# Patient Record
Sex: MALE | Race: WHITE | NOT HISPANIC OR LATINO | Employment: OTHER | URBAN - METROPOLITAN AREA
[De-identification: names, ages, dates, MRNs, and addresses within clinical notes are randomized per-mention and may not be internally consistent; named-entity substitution may affect disease eponyms.]

---

## 2017-03-22 ENCOUNTER — APPOINTMENT (OUTPATIENT)
Dept: WOUND CARE | Facility: HOSPITAL | Age: 52
End: 2017-03-22
Payer: MEDICARE

## 2017-03-22 PROCEDURE — 99213 OFFICE O/P EST LOW 20 MIN: CPT

## 2017-08-25 ENCOUNTER — HOSPITAL ENCOUNTER (OUTPATIENT)
Facility: HOSPITAL | Age: 52
Setting detail: OBSERVATION
Discharge: HOME | End: 2017-08-25
Attending: EMERGENCY MEDICINE | Admitting: STUDENT IN AN ORGANIZED HEALTH CARE EDUCATION/TRAINING PROGRAM
Payer: MEDICARE

## 2017-08-25 VITALS
WEIGHT: 137.13 LBS | HEIGHT: 66 IN | SYSTOLIC BLOOD PRESSURE: 101 MMHG | DIASTOLIC BLOOD PRESSURE: 55 MMHG | TEMPERATURE: 95 F | HEART RATE: 68 BPM | OXYGEN SATURATION: 95 % | RESPIRATION RATE: 16 BRPM | BODY MASS INDEX: 22.04 KG/M2

## 2017-08-25 DIAGNOSIS — N47.2 PARAPHIMOSIS: Primary | ICD-10-CM

## 2017-08-25 PROBLEM — R45.1 AGITATION: Status: ACTIVE | Noted: 2017-08-25

## 2017-08-25 PROBLEM — G40.909 SEIZURE DISORDER (HCC): Status: ACTIVE | Noted: 2017-08-25

## 2017-08-25 PROBLEM — F63.9 IMPULSE CONTROL DISORDER: Status: ACTIVE | Noted: 2017-08-25

## 2017-08-25 LAB
ANION GAP SERPL CALCULATED.3IONS-SCNC: 5 MMOL/L (ref 4–13)
BUN SERPL-MCNC: 25 MG/DL (ref 5–25)
CALCIUM SERPL-MCNC: 8.8 MG/DL (ref 8.3–10.1)
CHLORIDE SERPL-SCNC: 105 MMOL/L (ref 100–108)
CO2 SERPL-SCNC: 32 MMOL/L (ref 21–32)
CREAT SERPL-MCNC: 0.93 MG/DL (ref 0.6–1.3)
EOSINOPHIL # BLD AUTO: 0.39 THOUSAND/UL (ref 0–0.61)
EOSINOPHIL NFR BLD MANUAL: 8 % (ref 0–6)
ERYTHROCYTE [DISTWIDTH] IN BLOOD BY AUTOMATED COUNT: 16.2 % (ref 11.6–15.1)
GFR SERPL CREATININE-BSD FRML MDRD: 93 ML/MIN/1.73SQ M
GLUCOSE SERPL-MCNC: 71 MG/DL (ref 65–140)
HCT VFR BLD AUTO: 38 % (ref 42–52)
HGB BLD-MCNC: 12.8 G/DL (ref 14–18)
INR PPP: 1.08 (ref 0.86–1.16)
LG PLATELETS BLD QL SMEAR: PRESENT
LYMPHOCYTES # BLD AUTO: 1.57 THOUSAND/UL (ref 0.6–4.47)
LYMPHOCYTES # BLD AUTO: 32 %
MCH RBC QN AUTO: 33 PG (ref 27–31)
MCHC RBC AUTO-ENTMCNC: 33.6 G/DL (ref 31.4–37.4)
MCV RBC AUTO: 98 FL (ref 82–98)
MONOCYTES # BLD AUTO: 0.93 THOUSAND/UL (ref 0–1.22)
MONOCYTES NFR BLD AUTO: 19 % (ref 4–12)
NEUTS BAND NFR BLD MANUAL: 16 % (ref 0–8)
NEUTS SEG # BLD: 1.96 THOUSAND/UL (ref 1.81–6.82)
NEUTS SEG NFR BLD AUTO: 24 %
NRBC BLD AUTO-RTO: 0 /100 WBCS
PLATELET # BLD AUTO: 186 THOUSANDS/UL (ref 130–400)
PLATELET BLD QL SMEAR: ADEQUATE
PMV BLD AUTO: 9.2 FL (ref 8.9–12.7)
POTASSIUM SERPL-SCNC: 4.1 MMOL/L (ref 3.5–5.3)
PROMYELOCYTES NFR BLD MANUAL: 1 % (ref 0–0)
PROTHROMBIN TIME: 11.4 SECONDS (ref 9.4–11.7)
RBC # BLD AUTO: 3.87 MILLION/UL (ref 4.7–6.1)
SODIUM SERPL-SCNC: 142 MMOL/L (ref 136–145)
TOTAL CELLS COUNTED SPEC: 100
WBC # BLD AUTO: 4.9 THOUSAND/UL (ref 4.8–10.8)

## 2017-08-25 PROCEDURE — 85610 PROTHROMBIN TIME: CPT | Performed by: STUDENT IN AN ORGANIZED HEALTH CARE EDUCATION/TRAINING PROGRAM

## 2017-08-25 PROCEDURE — 85007 BL SMEAR W/DIFF WBC COUNT: CPT | Performed by: EMERGENCY MEDICINE

## 2017-08-25 PROCEDURE — 96372 THER/PROPH/DIAG INJ SC/IM: CPT

## 2017-08-25 PROCEDURE — 87147 CULTURE TYPE IMMUNOLOGIC: CPT | Performed by: STUDENT IN AN ORGANIZED HEALTH CARE EDUCATION/TRAINING PROGRAM

## 2017-08-25 PROCEDURE — 99284 EMERGENCY DEPT VISIT MOD MDM: CPT

## 2017-08-25 PROCEDURE — 36415 COLL VENOUS BLD VENIPUNCTURE: CPT | Performed by: EMERGENCY MEDICINE

## 2017-08-25 PROCEDURE — 80048 BASIC METABOLIC PNL TOTAL CA: CPT | Performed by: EMERGENCY MEDICINE

## 2017-08-25 PROCEDURE — 85027 COMPLETE CBC AUTOMATED: CPT | Performed by: EMERGENCY MEDICINE

## 2017-08-25 PROCEDURE — 87081 CULTURE SCREEN ONLY: CPT | Performed by: STUDENT IN AN ORGANIZED HEALTH CARE EDUCATION/TRAINING PROGRAM

## 2017-08-25 RX ORDER — LACOSAMIDE 50 MG/1
50 TABLET ORAL EVERY MORNING
Status: DISCONTINUED | OUTPATIENT
Start: 2017-08-25 | End: 2017-08-25 | Stop reason: HOSPADM

## 2017-08-25 RX ORDER — ASPIRIN 81 MG
100 TABLET, DELAYED RELEASE (ENTERIC COATED) ORAL EVERY MORNING
COMMUNITY

## 2017-08-25 RX ORDER — QUETIAPINE FUMARATE 25 MG/1
50 TABLET, FILM COATED ORAL
Status: DISCONTINUED | OUTPATIENT
Start: 2017-08-25 | End: 2017-08-25 | Stop reason: HOSPADM

## 2017-08-25 RX ORDER — QUETIAPINE FUMARATE 25 MG/1
25 TABLET, FILM COATED ORAL
COMMUNITY

## 2017-08-25 RX ORDER — POLYETHYLENE GLYCOL 3350 17 G/17G
17 POWDER, FOR SOLUTION ORAL DAILY
COMMUNITY

## 2017-08-25 RX ORDER — LACOSAMIDE 50 MG/1
75 TABLET ORAL
COMMUNITY
End: 2017-09-28

## 2017-08-25 RX ORDER — CHOLECALCIFEROL (VITAMIN D3) 125 MCG
5 CAPSULE ORAL
COMMUNITY

## 2017-08-25 RX ORDER — LEVETIRACETAM 100 MG/ML
250 SOLUTION ORAL 2 TIMES DAILY
COMMUNITY
End: 2018-10-02 | Stop reason: ALTCHOICE

## 2017-08-25 RX ORDER — LACOSAMIDE 50 MG/1
75 TABLET ORAL
Status: DISCONTINUED | OUTPATIENT
Start: 2017-08-25 | End: 2017-08-25 | Stop reason: HOSPADM

## 2017-08-25 RX ORDER — SODIUM CHLORIDE 9 MG/ML
50 INJECTION, SOLUTION INTRAVENOUS CONTINUOUS
Status: DISCONTINUED | OUTPATIENT
Start: 2017-08-25 | End: 2017-08-25 | Stop reason: HOSPADM

## 2017-08-25 RX ORDER — MIDAZOLAM HYDROCHLORIDE 1 MG/ML
5 INJECTION INTRAMUSCULAR; INTRAVENOUS ONCE
Status: COMPLETED | OUTPATIENT
Start: 2017-08-25 | End: 2017-08-25

## 2017-08-25 RX ORDER — LACOSAMIDE 50 MG/1
100 TABLET ORAL EVERY 12 HOURS SCHEDULED
COMMUNITY

## 2017-08-25 RX ORDER — L. ACIDOPHILUS/L.BULGARICUS 1MM CELL
2 TABLET ORAL 2 TIMES DAILY
COMMUNITY

## 2017-08-25 RX ORDER — QUETIAPINE FUMARATE 25 MG/1
25 TABLET, FILM COATED ORAL EVERY MORNING
Status: DISCONTINUED | OUTPATIENT
Start: 2017-08-25 | End: 2017-08-25 | Stop reason: HOSPADM

## 2017-08-25 RX ORDER — FAMOTIDINE 40 MG/5ML
20 POWDER, FOR SUSPENSION ORAL 2 TIMES DAILY
COMMUNITY

## 2017-08-25 RX ORDER — LEVETIRACETAM 100 MG/ML
500 SOLUTION ORAL 2 TIMES DAILY
Status: DISCONTINUED | OUTPATIENT
Start: 2017-08-25 | End: 2017-08-25 | Stop reason: HOSPADM

## 2017-08-25 RX ORDER — FAMOTIDINE 20 MG/1
40 TABLET, FILM COATED ORAL 2 TIMES DAILY
Status: DISCONTINUED | OUTPATIENT
Start: 2017-08-25 | End: 2017-08-25 | Stop reason: HOSPADM

## 2017-08-25 RX ORDER — QUETIAPINE FUMARATE 50 MG/1
50 TABLET, FILM COATED ORAL
COMMUNITY

## 2017-08-25 RX ADMIN — FAMOTIDINE 40 MG: 20 TABLET ORAL at 11:35

## 2017-08-25 RX ADMIN — HYDROMORPHONE HYDROCHLORIDE 0.5 MG: 1 INJECTION, SOLUTION INTRAMUSCULAR; INTRAVENOUS; SUBCUTANEOUS at 11:22

## 2017-08-25 RX ADMIN — MIDAZOLAM 5 MG: 1 INJECTION INTRAMUSCULAR; INTRAVENOUS at 00:45

## 2017-08-25 RX ADMIN — LEVETIRACETAM 500 MG: 100 SOLUTION ORAL at 11:32

## 2017-08-25 RX ADMIN — HYDROMORPHONE HYDROCHLORIDE 0.5 MG: 1 INJECTION, SOLUTION INTRAMUSCULAR; INTRAVENOUS; SUBCUTANEOUS at 03:23

## 2017-08-25 RX ADMIN — SODIUM CHLORIDE 50 ML/HR: 0.9 INJECTION, SOLUTION INTRAVENOUS at 04:02

## 2017-08-25 RX ADMIN — LACOSAMIDE 50 MG: 50 TABLET, FILM COATED ORAL at 11:35

## 2017-08-25 RX ADMIN — QUETIAPINE FUMARATE 25 MG: 25 TABLET, FILM COATED ORAL at 11:37

## 2017-08-27 LAB — MRSA NOSE QL CULT: ABNORMAL

## 2017-09-08 ENCOUNTER — HOSPITAL ENCOUNTER (EMERGENCY)
Facility: HOSPITAL | Age: 52
Discharge: NURSING, REHAB OR LONG-TERM CARE | End: 2017-09-08
Attending: EMERGENCY MEDICINE
Payer: MEDICARE

## 2017-09-08 ENCOUNTER — APPOINTMENT (EMERGENCY)
Dept: RADIOLOGY | Facility: HOSPITAL | Age: 52
End: 2017-09-08
Payer: MEDICARE

## 2017-09-08 VITALS
RESPIRATION RATE: 16 BRPM | TEMPERATURE: 95.5 F | HEART RATE: 72 BPM | DIASTOLIC BLOOD PRESSURE: 62 MMHG | SYSTOLIC BLOOD PRESSURE: 106 MMHG | HEIGHT: 66 IN

## 2017-09-08 DIAGNOSIS — M25.472 EDEMA OF BOTH ANKLES: ICD-10-CM

## 2017-09-08 DIAGNOSIS — I82.409 DVT (DEEP VENOUS THROMBOSIS) (HCC): Primary | ICD-10-CM

## 2017-09-08 DIAGNOSIS — S62.609A FINGER FRACTURE, RIGHT: ICD-10-CM

## 2017-09-08 DIAGNOSIS — M25.471 EDEMA OF BOTH ANKLES: ICD-10-CM

## 2017-09-08 DIAGNOSIS — I82.533 CHRONIC DEEP VEIN THROMBOSIS (DVT) OF BOTH POPLITEAL VEINS (HCC): ICD-10-CM

## 2017-09-08 PROCEDURE — 93970 EXTREMITY STUDY: CPT

## 2017-09-08 PROCEDURE — 99283 EMERGENCY DEPT VISIT LOW MDM: CPT

## 2017-09-08 RX ADMIN — APIXABAN 5 MG: 5 TABLET, FILM COATED ORAL at 17:51

## 2017-09-16 ENCOUNTER — HOSPITAL ENCOUNTER (EMERGENCY)
Facility: HOSPITAL | Age: 52
Discharge: HOME | End: 2017-09-17
Attending: EMERGENCY MEDICINE | Admitting: EMERGENCY MEDICINE
Payer: MEDICARE

## 2017-09-16 VITALS
HEART RATE: 58 BPM | DIASTOLIC BLOOD PRESSURE: 49 MMHG | SYSTOLIC BLOOD PRESSURE: 106 MMHG | OXYGEN SATURATION: 99 % | RESPIRATION RATE: 18 BRPM | TEMPERATURE: 96.8 F

## 2017-09-16 DIAGNOSIS — R31.9 HEMATURIA: ICD-10-CM

## 2017-09-16 DIAGNOSIS — R79.89 ELEVATED LFTS: ICD-10-CM

## 2017-09-16 DIAGNOSIS — N39.0 UTI (URINARY TRACT INFECTION): Primary | ICD-10-CM

## 2017-09-16 LAB
ALBUMIN SERPL BCP-MCNC: 2.3 G/DL (ref 3.5–5)
ALP SERPL-CCNC: 244 U/L (ref 46–116)
ALT SERPL W P-5'-P-CCNC: 221 U/L (ref 12–78)
ANION GAP SERPL CALCULATED.3IONS-SCNC: 3 MMOL/L (ref 4–13)
APTT PPP: 29 SECONDS (ref 23–35)
AST SERPL W P-5'-P-CCNC: 129 U/L (ref 5–45)
BACTERIA UR QL AUTO: ABNORMAL /HPF
BASOPHILS # BLD AUTO: 0.2 THOUSANDS/ΜL (ref 0–0.1)
BASOPHILS NFR BLD AUTO: 3 % (ref 0–1)
BILIRUB SERPL-MCNC: 1 MG/DL (ref 0.2–1)
BILIRUB UR QL STRIP: ABNORMAL
BUN SERPL-MCNC: 25 MG/DL (ref 5–25)
CALCIUM SERPL-MCNC: 8.9 MG/DL (ref 8.3–10.1)
CHLORIDE SERPL-SCNC: 103 MMOL/L (ref 100–108)
CLARITY UR: ABNORMAL
CO2 SERPL-SCNC: 33 MMOL/L (ref 21–32)
COLOR UR: ABNORMAL
CREAT SERPL-MCNC: 1.21 MG/DL (ref 0.6–1.3)
EOSINOPHIL # BLD AUTO: 0.3 THOUSAND/ΜL (ref 0–0.61)
EOSINOPHIL NFR BLD AUTO: 5 % (ref 0–6)
ERYTHROCYTE [DISTWIDTH] IN BLOOD BY AUTOMATED COUNT: 16 % (ref 11.6–15.1)
GFR SERPL CREATININE-BSD FRML MDRD: 67 ML/MIN/1.73SQ M
GLUCOSE SERPL-MCNC: 77 MG/DL (ref 65–140)
GLUCOSE UR STRIP-MCNC: NEGATIVE MG/DL
HCT VFR BLD AUTO: 40.3 % (ref 42–52)
HGB BLD-MCNC: 13.7 G/DL (ref 14–18)
HGB UR QL STRIP.AUTO: ABNORMAL
INR PPP: 1.2 (ref 0.86–1.16)
KETONES UR STRIP-MCNC: ABNORMAL MG/DL
LEUKOCYTE ESTERASE UR QL STRIP: ABNORMAL
LYMPHOCYTES # BLD AUTO: 2.1 THOUSANDS/ΜL (ref 0.6–4.47)
LYMPHOCYTES NFR BLD AUTO: 33 % (ref 14–44)
MCH RBC QN AUTO: 34.2 PG (ref 27–31)
MCHC RBC AUTO-ENTMCNC: 33.9 G/DL (ref 31.4–37.4)
MCV RBC AUTO: 101 FL (ref 82–98)
MONOCYTES # BLD AUTO: 1.1 THOUSAND/ΜL (ref 0.17–1.22)
MONOCYTES NFR BLD AUTO: 18 % (ref 4–12)
NEUTROPHILS # BLD AUTO: 2.6 THOUSANDS/ΜL (ref 1.85–7.62)
NEUTS SEG NFR BLD AUTO: 42 % (ref 43–75)
NITRITE UR QL STRIP: POSITIVE
NON-SQ EPI CELLS URNS QL MICRO: ABNORMAL /HPF
NRBC BLD AUTO-RTO: 0 /100 WBCS
PH UR STRIP.AUTO: 8.5 [PH] (ref 5–9)
PLATELET # BLD AUTO: 339 THOUSANDS/UL (ref 130–400)
PMV BLD AUTO: 8.7 FL (ref 8.9–12.7)
POTASSIUM SERPL-SCNC: 4.8 MMOL/L (ref 3.5–5.3)
PROT SERPL-MCNC: 7.3 G/DL (ref 6.4–8.2)
PROT UR STRIP-MCNC: ABNORMAL MG/DL
PROTHROMBIN TIME: 12.6 SECONDS (ref 9.4–11.7)
RBC # BLD AUTO: 3.99 MILLION/UL (ref 4.7–6.1)
RBC #/AREA URNS AUTO: ABNORMAL /HPF
SODIUM SERPL-SCNC: 139 MMOL/L (ref 136–145)
SP GR UR STRIP.AUTO: 1.01 (ref 1–1.03)
UROBILINOGEN UR QL STRIP.AUTO: 1 E.U./DL
WBC # BLD AUTO: 6.3 THOUSAND/UL (ref 4.8–10.8)
WBC #/AREA URNS AUTO: ABNORMAL /HPF

## 2017-09-16 PROCEDURE — 81001 URINALYSIS AUTO W/SCOPE: CPT | Performed by: EMERGENCY MEDICINE

## 2017-09-16 PROCEDURE — 87086 URINE CULTURE/COLONY COUNT: CPT | Performed by: EMERGENCY MEDICINE

## 2017-09-16 PROCEDURE — 85025 COMPLETE CBC W/AUTO DIFF WBC: CPT | Performed by: EMERGENCY MEDICINE

## 2017-09-16 PROCEDURE — 36415 COLL VENOUS BLD VENIPUNCTURE: CPT | Performed by: EMERGENCY MEDICINE

## 2017-09-16 PROCEDURE — 85730 THROMBOPLASTIN TIME PARTIAL: CPT | Performed by: EMERGENCY MEDICINE

## 2017-09-16 PROCEDURE — 80053 COMPREHEN METABOLIC PANEL: CPT | Performed by: EMERGENCY MEDICINE

## 2017-09-16 PROCEDURE — 85610 PROTHROMBIN TIME: CPT | Performed by: EMERGENCY MEDICINE

## 2017-09-16 RX ORDER — SULFAMETHOXAZOLE AND TRIMETHOPRIM 200; 40 MG/5ML; MG/5ML
20 SUSPENSION ORAL 2 TIMES DAILY
Qty: 100 ML | Refills: 0 | Status: SHIPPED | OUTPATIENT
Start: 2017-09-17 | End: 2017-09-22

## 2017-09-16 RX ORDER — SULFAMETHOXAZOLE AND TRIMETHOPRIM 200; 40 MG/5ML; MG/5ML
160 SUSPENSION ORAL ONCE
Status: COMPLETED | OUTPATIENT
Start: 2017-09-17 | End: 2017-09-17

## 2017-09-17 PROCEDURE — 99283 EMERGENCY DEPT VISIT LOW MDM: CPT

## 2017-09-17 RX ADMIN — SULFAMETHOXAZOLE AND TRIMETHOPRIM 20 ML: 200; 40 SUSPENSION ORAL at 00:07

## 2017-09-18 LAB — BACTERIA UR CULT: NORMAL

## 2017-09-28 ENCOUNTER — HOSPITAL ENCOUNTER (EMERGENCY)
Facility: HOSPITAL | Age: 52
Discharge: HOME | End: 2017-09-29
Attending: EMERGENCY MEDICINE | Admitting: EMERGENCY MEDICINE
Payer: MEDICARE

## 2017-09-28 VITALS
RESPIRATION RATE: 18 BRPM | BODY MASS INDEX: 22.11 KG/M2 | DIASTOLIC BLOOD PRESSURE: 56 MMHG | WEIGHT: 137 LBS | TEMPERATURE: 96.7 F | HEART RATE: 66 BPM | OXYGEN SATURATION: 99 % | SYSTOLIC BLOOD PRESSURE: 97 MMHG

## 2017-09-28 DIAGNOSIS — N47.2 PARAPHIMOSIS: Primary | ICD-10-CM

## 2017-09-28 RX ORDER — KETOCONAZOLE 20 MG/G
1 CREAM TOPICAL 2 TIMES DAILY
COMMUNITY

## 2017-09-29 PROCEDURE — 99283 EMERGENCY DEPT VISIT LOW MDM: CPT

## 2017-09-29 NOTE — DISCHARGE INSTRUCTIONS
Acute Paraphimosis   AMBULATORY CARE:   Acute paraphimosis  is abnormal tightness of the foreskin when it is pulled back  The foreskin is the skin that covers the head (glans) of the penis  Usually, the foreskin can be pulled back onto the penis and uncover the glans  Acute paraphimosis prevents your foreskin from being pulled back  Common symptoms include the following:   · Swollen glans and shaft    · Bluish or dark glans    · Pain in your penis    · Pain when you urinate, or problems urinating  Seek care immediately if:   · You have sudden pain or swelling in your penis  · You lose feeling in your penis  · You have an open wound on your penis  Contact your healthcare provider if:   · Your signs and symptoms return or worsen  · You have pain during sexual activities  · You have questions or concerns about your condition or care  Treatment for acute paraphimosis  may not be needed  The swelling in your penis should decrease after your foreskin has returned to its normal position  You may need the following treatments if your foreskin does not return to its normal position:  · Medicines  may help decrease pain or swelling  · An ice pack  may be placed on the foreskin and glans for 5 to 10 minutes to decrease inflammation  · Tight pressure  may be needed for a short period of time  This will help decrease inflammation  Healthcare providers may wrap your penis with a bandage for 5 to 10 minutes  A bandage with numbing medicine may be used  · Procedures  may be needed to move your foreskin back into position over the glans  Another procedure may be needed to decrease severe swelling  · Surgery  may be needed if other treatments do not work  During surgery, the foreskin is placed in the right position  pressure and swelling are relieved  You may need a circumcision after this procedure because cutting the foreskin will change how your penis looks    Self care:   · Do not have sex until your healthcare provider says it is okay  Do not have any sexual activity for 7 to 10 days, to allow the penis to heal  Sexual activity includes intercourse and masturbation  Ask when you can go back to your usual sexual activities  · Keep your penis clean  Clean your penis every day by removing the smegma around your glans  Ask for more information about foreskin care  · Gently move your foreskin back to the normal position  Every time your foreskin is pulled back, make sure it returns to its original position  The foreskin must always cover the glans  Do not force the foreskin back over the glans  Force can cause scars to form on the penis  · Do not use penile rings  Penile rings can cause swelling and infection  Follow up with your healthcare provider as directed:  Write down your questions so you remember to ask them during your visits  © 2017 [employer] Information is for End User's use only and may not be sold, redistributed or otherwise used for commercial purposes  All illustrations and images included in CareNotes® are the copyrighted property of A D [employer] M [employer]  or Gregory Gonzalez  The above information is an  only  It is not intended as medical advice for individual conditions or treatments  Talk to your doctor, nurse or pharmacist before following any medical regimen to see if it is safe and effective for you

## 2017-09-29 NOTE — ED PROVIDER NOTES
History  Chief Complaint   Patient presents with    Penis Swelling     Resident of [facility]  Per transfer form [facility] " client with redness swelling of foreskin, able to reduce but promptly retracts"     47-year-old male from [address] [place] [address] with mental retardation cerebral palsy presents with his caregiver for complaints of paraphimosis  Patient's foreskin is not retracting over his glans penis  They tried tried to reduce it at the center however it keeps retracting  He has a Godoy catheter in place  Such an event happened month ago he was admitted to the hospital evaluated by urology Dr Izabel Fields who reduced the paraphimosis at bedside and he was discharged home  No reports of fevers chills vomiting abdominal pain or any other complaints  History provided by:  Patient   used: No        Prior to Admission Medications   Prescriptions Last Dose Informant Patient Reported? Taking?    Dentifrices (BIOTENE DRY MOUTH CARE DT) 2017 at [facility]  Yes Yes   Sig: Apply to teeth 2 (two) times a day 2 sprays   Docusate Sodium 50 MG/15ML LIQD 2017 at Unknown time  Yes Yes   Si mg every morning Via G tube    Melatonin 5 MG TABS 2017 at Unknown time  Yes Yes   Si mg by Per G Tube route daily at bedtime     QUEtiapine (SEROquel) 25 mg tablet 2017 at Unknown time  Yes Yes   Si mg by Per G Tube route every morning     QUEtiapine (SEROquel) 50 mg tablet 2017 at Unknown time  Yes Yes   Si mg by Per G Tube route daily at bedtime     famotidine (PEPCID) 40 mg/5 mL suspension 2017 at Unknown time  Yes Yes   Si mg by Per G Tube route 2 (two) times a day     ketoconazole (NIZORAL) 2 % cream 2017 at Unknown time  Yes Yes   Sig: Apply 1 application topically daily To face   lacosamide (VIMPAT) 50 mg 2017 at Unknown time  Yes Yes   Si mg by Per G Tube route every 12 (twelve) hours     lactobacillus acidophilus-bulgaricus (FLORANEX) tablet 2017 at Unknown time  Yes Yes   Si tablets 2 (two) times a day Via G-tube    levETIRAcetam (KEPPRA) 100 mg/mL oral solution 2017 at Unknown time  Yes Yes   Si mg by Per G Tube route 2 (two) times a day For 2 weeks then DC , started on   Than on 10/2 start 250 mg every bedtime    polyethylene glycol (MIRALAX) 17 g packet 2017 at Unknown time  Yes Yes   Si g daily Via G-tube       Facility-Administered Medications: None       Past Medical History:   Diagnosis Date    Constipation     Down syndrome     Fall     Folliculitis     Fracture of left forearm     Gastritis     Impulse control disorder     Left leg DVT     Pericardial effusion     Pleural effusion, bilateral     Pneumonia     Seizure        Past Surgical History:   Procedure Laterality Date    EXPLORATORY LAPAROTOMY      for abdominal pain    PEG TUBE PLACEMENT         No family history on file  I have reviewed and agree with the history as documented  Social History   Substance Use Topics    Smoking status: Never Smoker    Smokeless tobacco: Never Used    Alcohol use No        Review of Systems   All other systems reviewed and are negative  Physical Exam  ED Triage Vitals   Temperature Pulse Respirations Blood Pressure SpO2   17   (!) 96 7 °F (35 9 °C) 66 18 (!) 85/47 99 %      Temp Source Heart Rate Source Patient Position - Orthostatic VS BP Location FiO2 (%)   17 [id number] --   Tympanic Monitor Lying Left arm       Pain Score       --                  Physical Exam   Constitutional: He is oriented to person, place, and time  He appears well-developed and well-nourished  HENT:   Head: Normocephalic and atraumatic  Eyes: EOM are normal  Pupils are equal, round, and reactive to light  Neck: Normal range of motion  Neck supple  Cardiovascular: Normal rate and regular rhythm  Pulmonary/Chest: Effort normal and breath sounds normal    Abdominal: Soft  Bowel sounds are normal    Genitourinary:   Genitourinary Comments: Glans is edematous and mildly red however no signs of gangrene, and paraphimosis noted with gallagher catheter in place  Musculoskeletal: Normal range of motion  Neurological: He is alert and oriented to person, place, and time  Skin: Skin is warm and dry  Psychiatric: He has a normal mood and affect  Nursing note and vitals reviewed  ED Medications  Medications - No data to display    Diagnostic Studies  Labs Reviewed - No data to display    No orders to display       Procedures  Procedures      Phone Contacts  ED Phone Contact    ED Course  ED Course                                MDM  Number of Diagnoses or Management Options  Diagnosis management comments: Paraphimosis    I tried reducing the paraphimosis at bedside however it keeps retracting back  I spoke to Dr Lakia Garcia who advised the patient follow up in his clinic the next day within 24 hours  Patient Progress  Patient progress: stable    CritCare Time    Disposition  Final diagnoses:   Paraphimosis     ED Disposition     ED Disposition Condition Comment    Discharge  [place] [facility] discharge to home/self care  Condition at discharge: Stable        Follow-up Information     Follow up With Specialties Details Why Contact Info Additional Information    Amy Perez MD Urology Schedule an appointment as soon as possible for a visit in 1 day  [address] [place] [id number]  [place] [address] Emergency Department Emergency Medicine  If symptoms worsen [address] [place] [address]  [phone] [facility], [facility], [address]        Patient's Medications   Discharge Prescriptions    No medications on file     No discharge procedures on file      ED Provider  Electronically Signed by       Mariela Crowley DO  09/28/17 [address]

## 2017-09-29 NOTE — ED NOTES
Spoke with nursing supervisor at [facility] given on follow up care and in regards to transportation back to facility,ED will have to arrange transport back kellie Villanueva RN  09/28/17 9231

## 2017-10-12 ENCOUNTER — ANESTHESIA (OUTPATIENT)
Dept: PERIOP | Facility: HOSPITAL | Age: 52
End: 2017-10-12

## 2017-10-12 ENCOUNTER — ANESTHESIA EVENT (OUTPATIENT)
Dept: PERIOP | Facility: HOSPITAL | Age: 52
End: 2017-10-12

## 2018-03-23 ENCOUNTER — OFFICE VISIT (OUTPATIENT)
Dept: CARDIOLOGY CLINIC | Facility: CLINIC | Age: 53
End: 2018-03-23
Payer: MEDICARE

## 2018-03-23 VITALS
SYSTOLIC BLOOD PRESSURE: 104 MMHG | HEART RATE: 60 BPM | WEIGHT: 134 LBS | BODY MASS INDEX: 21.53 KG/M2 | HEIGHT: 66 IN | DIASTOLIC BLOOD PRESSURE: 56 MMHG

## 2018-03-23 DIAGNOSIS — Z01.810 PREOPERATIVE CARDIOVASCULAR EXAMINATION: Primary | ICD-10-CM

## 2018-03-23 DIAGNOSIS — N47.2 PARAPHIMOSIS: ICD-10-CM

## 2018-03-23 DIAGNOSIS — Q90.9 DOWN SYNDROME: ICD-10-CM

## 2018-03-23 DIAGNOSIS — F73 PROFOUND INTELLECTUAL DISABILITY IN ADULT: ICD-10-CM

## 2018-03-23 DIAGNOSIS — R45.1 AGITATION: ICD-10-CM

## 2018-03-23 DIAGNOSIS — G40.909 SEIZURE DISORDER (HCC): ICD-10-CM

## 2018-03-23 DIAGNOSIS — R94.31 ABNORMAL EKG: ICD-10-CM

## 2018-03-23 DIAGNOSIS — F63.9 IMPULSE CONTROL DISORDER: ICD-10-CM

## 2018-03-23 PROCEDURE — 99214 OFFICE O/P EST MOD 30 MIN: CPT | Performed by: INTERNAL MEDICINE

## 2018-03-23 RX ORDER — OSELTAMIVIR PHOSPHATE 75 MG/1
CAPSULE ORAL
COMMUNITY
Start: 2018-02-11 | End: 2018-10-02 | Stop reason: ALTCHOICE

## 2018-03-23 RX ORDER — OFLOXACIN 3 MG/ML
SOLUTION AURICULAR (OTIC)
COMMUNITY
Start: 2018-02-23 | End: 2018-10-02 | Stop reason: ALTCHOICE

## 2018-03-23 RX ORDER — LACOSAMIDE 100 MG/1
TABLET, FILM COATED ORAL
COMMUNITY
Start: 2018-03-06 | End: 2018-03-23 | Stop reason: SDUPTHER

## 2018-03-23 RX ORDER — POLYETHYLENE GLYCOL 3350 17 G/17G
POWDER, FOR SOLUTION ORAL
COMMUNITY
Start: 2018-01-29 | End: 2018-03-23

## 2018-03-23 NOTE — PATIENT INSTRUCTIONS
1   No cardiac contraindication to proposed surgery on 03/28/2018   2   Suggest non urgent and elective transthoracic echocardiogram to assess inferior wall motion with patient  sedated prior to procedure  3   No needed present for other cardiac testing

## 2018-03-23 NOTE — PROGRESS NOTES
Consultation - Cardiology   Shanna Gonzalez 52 y o  male MRN: [id number]  Unit/Bed#:  Encounter: [id number]        Assessment/Plan     Assessment:    1  No active cardiac disease  2   Extremely low cardiac risk for proposed surgery on 03/28/2018  3   Incomplete right bundle branch block with nondiagnostic inferior Q-waves on 03/16/2018 EKG  4   History of profound intellectual disability and Down's syndrome with impulse control disorder and agitation  5   Presence of PEG tube and history of aspiration pneumonia as well as prior tracheostomy  6   History of pericardial effusion and pericardial window  7  History of left lower extremity DVT  8  History of GERD  9  History of seizure disorder  10  Mild anemia    Plan:    1  No cardiac contraindication to proposed surgery on 03/28/2018   2   Suggest non urgent and elective transthoracic echocardiogram to assess inferior wall motion with patient  sedated prior to procedure  3   No need at present for other cardiac testing  History of Present Illness   Physician Requesting Consult:   Dr Radha Farias of [facility]    Reason for Consult / Principal Problem:  Abnormal EKG and need for preoperative cardiovascular risk assessment     HPI: Shanna Gonzalez is a 52y o  year old male who presents with abnormal EKG dated 03/16/2018  At that time he had a pattern of incomplete right bundle branch block with nondiagnostic inferior Q-waves, raising the possibility of previous inferior infarction  Incidental note was made of low QRS voltage  The patient is currently scheduled for circumcision on 03/28/2018 due to paraphimosis  He has no known cardiac history  The patient is nonverbal with information obtained by outside record, laboratory, EKG, and medication review, which consumed 38 minutes          Historical Information   Past Medical History:   Diagnosis Date    Constipation     Down syndrome     Dysphagia     has a peg tube    Fall     Folliculitis     Fracture of left forearm     Gastritis     GERD (gastroesophageal reflux disease)     Impulse control disorder     Jaw fracture (HCC)     s/p right mandibular fracture  s/p tracheostomy    [facility])     Pericardial effusion     Pericardial effusion     had a pericardial window 2015    Pleural effusion, bilateral     Pneumonia     [facility] [address] [facility]     Seizure ([facility] [address] )     new onset 2013     Past Surgical History:   Procedure Laterality Date    DENTAL REHABILITATION      EXPLORATORY LAPAROTOMY      for abdominal pain    PEG TUBE PLACEMENT      TRACHEOSTOMY      temporary after fall and jaw fracture 2015-weaned off vent and trach removed     History   Alcohol Use No     History   Drug Use No     History   Smoking Status    Never Smoker   Smokeless Tobacco    Never Used     History reviewed  No pertinent family history  Meds/Allergies   Prior to Admission medications    Medication Sig Start Date End Date Taking? Authorizing Provider   bisacodyl (DULCOLAX) 10 mg suppository Insert 10 mg into the rectum daily as needed for constipation   Yes Historical Provider, MD   Docusate Sodium 50 MG/15ML LIQD 100 mg every morning Via G tube    Yes Historical Provider, MD   famotidine (PEPCID) 40 mg/5 mL suspension 40 mg by Per G Tube route 2 (two) times a day     Yes Historical Provider, MD   ketoconazole (NIZORAL) 2 % cream Apply 1 application topically 2 (two) times a day To face     Yes Historical Provider, MD   lacosamide (VIMPAT) 50 mg 100 mg by Per G Tube route every 12 (twelve) hours     Yes Historical Provider, MD   lactobacillus acidophilus-bulgaricus (FLORANEX) tablet Take 2 tablets by mouth 2 (two) times a day Via G-tube    Yes Historical Provider, MD   levETIRAcetam (KEPPRA) 100 mg/mL oral solution 250 mg by Per G Tube route 2 (two) times a day For 2 weeks then DC , started on 9/18   Than on 10/2 start 250 mg every bedtime    Yes Historical Provider, MD   Melatonin 5 MG TABS 5 mg by Per G Tube route daily at bedtime     Yes Historical Provider, MD   Mouthwashes ([facility]) LIQD Apply to the mouth or throat 2 (two) times a day   Yes Historical Provider, MD   Nutritional Supplements (NUTREN 1 5 PO) Take by mouth Give at 45 ml/ hour until 1035 is infused in 22 hours with H2O flush of 30 ml/hr   Yes Historical Provider, MD   nystatin-triamcinolone (MYCOLOG-II) cream  3/14/18  Yes Historical Provider, MD   ofloxacin (FLOXIN) 0 3 % otic solution  2/23/18  Yes Historical Provider, MD   oseltamivir (TAMIFLU) 75 mg capsule  2/11/18  Yes Historical Provider, MD   polyethylene glycol (MIRALAX) 17 g packet 17 g daily Via G-tube    Yes Historical Provider, MD   povidone-iodine (BETADINE) 10 % external solution Apply topically daily at bedtime   Yes Historical Provider, MD   QUEtiapine (SEROquel) 25 mg tablet 25 mg by Per G Tube route every morning     Yes Historical Provider, MD   QUEtiapine (SEROquel) 50 mg tablet 50 mg by Per G Tube route daily at bedtime     Yes Historical Provider, MD   silver sulfadiazine (SILVADENE,SSD) 1 % cream Apply topically 2 (two) times a day   Yes Historical Provider, MD   Dentifrices ([facility]) Apply to teeth 2 (two) times a day 2 sprays  3/23/18 Yes Historical Provider, MD   VIMPAT 100 MG tablet  3/6/18 3/23/18 Yes Historical Provider, MD   polyethylene glycol Duy Olamide) powder  1/29/18 3/23/18  Historical Provider, MD     Current Outpatient Prescriptions   Medication Sig Dispense Refill    bisacodyl (DULCOLAX) 10 mg suppository Insert 10 mg into the rectum daily as needed for constipation      Docusate Sodium 50 MG/15ML LIQD 100 mg every morning Via G tube       famotidine (PEPCID) 40 mg/5 mL suspension 40 mg by Per G Tube route 2 (two) times a day        ketoconazole (NIZORAL) 2 % cream Apply 1 application topically 2 (two) times a day To face        lacosamide (VIMPAT) 50 mg 100 mg by Per G Tube route every 12 (twelve) hours        lactobacillus acidophilus-bulgaricus [facility]) tablet Take 2 tablets by mouth 2 (two) times a day Via G-tube       levETIRAcetam (KEPPRA) 100 mg/mL oral solution 250 mg by Per G Tube route 2 (two) times a day For 2 weeks then DC , started on 9/18  Than on 10/2 start 250 mg every bedtime       Melatonin 5 MG TABS 5 mg by Per G Tube route daily at bedtime        Mouthwashes (BIOTENE DRY MOUTH) LIQD Apply to the mouth or throat 2 (two) times a day      Nutritional Supplements (NUTREN 1 5 PO) Take by mouth Give at 45 ml/ hour until 1035 is infused in 22 hours with H2O flush of 30 ml/hr      nystatin-triamcinolone (MYCOLOG-II) cream       ofloxacin (FLOXIN) 0 3 % otic solution       oseltamivir (TAMIFLU) 75 mg capsule       polyethylene glycol (MIRALAX) 17 g packet 17 g daily Via G-tube       povidone-iodine (BETADINE) 10 % external solution Apply topically daily at bedtime      QUEtiapine (SEROquel) 25 mg tablet 25 mg by Per G Tube route every morning        QUEtiapine (SEROquel) 50 mg tablet 50 mg by Per G Tube route daily at bedtime        silver sulfadiazine (SILVADENE,SSD) 1 % cream Apply topically 2 (two) times a day       No current facility-administered medications for this visit  No Known Allergies    Cardiovascular ROS:       More than 10 systems reviewed and all systems negative, except as noted in history of present illness    Objective   Vitals: Blood pressure 104/56, pulse 60, height 5' 6" (1 676 m), weight 60 8 kg (134 lb)  Body mass index is 21 63 kg/m²  Physical Exam  General -well-developed well-nourished   male who is markedly agitated    Patient is alert and combative  [place] and dry with no pallor, rashes, ecchymoses, lesions  HEENT-normocephalic  Pupils equally round and reactive to light and accommodation  Extraocular muscles intact  Mucous membranes pink and moist  Sclerae nonicteric  Optic fundi poorly seen    Neck-no jugular venous distention, AJR, masses, thyromegaly, adenopathy, bruits  Lungs-no rales, rhonchi, wheezes  Breath sounds are distant owing to lack of cooperation  Heart-no murmur, gallop, rub, click, heave, thrill  Heart sounds are distant owing to lack of cooperation  Abdomen-normal bowel sounds with no masses, organomegaly, guarding, tenderness, or rigidity  Extremities-no cyanosis, clubbing, edema, pulse decrease  Neurological-no focal neurological signs  No results found for this or any previous visit (from the past 24 hour(s))  Imaging: I have personally reviewed pertinent films in PACS  EKG 03/16/2018: Incomplete right bundle branch block  Nondiagnostic inferior Q-waves  Low QRS voltage    Imaging  Chest X-Ray:  No Chest XR results available for this patient  Lab Review     Outside laboratory data 03/15/2018:    Normal BMP, PT/PTT, WBC and platelets  H/H-11 8/35    Lab Results   Component Value Date     09/16/2017    K 4 8 09/16/2017     09/16/2017    CO2 33 (H) 09/16/2017    ANIONGAP 3 (L) 09/16/2017    BUN 25 09/16/2017    CREATININE 1 21 09/16/2017    GLUCOSE 77 09/16/2017    CALCIUM 8 9 09/16/2017     (H) 09/16/2017     (H) 09/16/2017    ALKPHOS 244 (H) 09/16/2017    PROT 7 3 09/16/2017    BILITOT 1 00 09/16/2017    EGFR 67 09/16/2017       No results found for: CHOL  No results found for: HDL  No results found for: LDLCALC  No results found for: TRIG  No components found for: CHOLHDL    Lab Results   Component Value Date    GLUCOSE 77 09/16/2017    CALCIUM 8 9 09/16/2017     09/16/2017    K 4 8 09/16/2017    CO2 33 (H) 09/16/2017     09/16/2017    BUN 25 09/16/2017    CREATININE 1 21 09/16/2017       Counseling / Coordination of Care  Total  time spent today 42 minutes       Andrew Michaud MD

## 2018-10-02 ENCOUNTER — HOSPITAL ENCOUNTER (INPATIENT)
Facility: HOSPITAL | Age: 53
LOS: 10 days | Discharge: NURSING, REHAB OR LONG-TERM CARE | DRG: 871 | End: 2018-10-12
Attending: EMERGENCY MEDICINE | Admitting: INTERNAL MEDICINE
Payer: MEDICARE

## 2018-10-02 ENCOUNTER — APPOINTMENT (EMERGENCY)
Dept: RADIOLOGY | Facility: HOSPITAL | Age: 53
DRG: 871 | End: 2018-10-02
Payer: MEDICARE

## 2018-10-02 DIAGNOSIS — D69.6 THROMBOCYTOPENIA (HCC): ICD-10-CM

## 2018-10-02 DIAGNOSIS — G40.909 SEIZURE DISORDER (HCC): ICD-10-CM

## 2018-10-02 DIAGNOSIS — J18.9 PNEUMONIA: ICD-10-CM

## 2018-10-02 DIAGNOSIS — L89.310 PRESSURE INJURY OF RIGHT BUTTOCK, UNSTAGEABLE (HCC): ICD-10-CM

## 2018-10-02 DIAGNOSIS — N17.9 ACUTE KIDNEY INJURY (HCC): ICD-10-CM

## 2018-10-02 DIAGNOSIS — Q90.9 DOWN SYNDROME: ICD-10-CM

## 2018-10-02 DIAGNOSIS — J96.01 ACUTE RESPIRATORY FAILURE WITH HYPOXIA (HCC): ICD-10-CM

## 2018-10-02 DIAGNOSIS — Z93.1 PEG (PERCUTANEOUS ENDOSCOPIC GASTROSTOMY) STATUS (HCC): ICD-10-CM

## 2018-10-02 DIAGNOSIS — R65.20 SEVERE SEPSIS (HCC): Primary | ICD-10-CM

## 2018-10-02 DIAGNOSIS — A41.9 SEVERE SEPSIS (HCC): Primary | ICD-10-CM

## 2018-10-02 DIAGNOSIS — E87.0 HYPERNATREMIA: ICD-10-CM

## 2018-10-02 DIAGNOSIS — A41.9 SEPTIC SHOCK (HCC): ICD-10-CM

## 2018-10-02 DIAGNOSIS — R65.21 SEPTIC SHOCK (HCC): ICD-10-CM

## 2018-10-02 PROBLEM — D72.829 LEUKOCYTOSIS: Status: ACTIVE | Noted: 2018-10-02

## 2018-10-02 PROBLEM — N39.0 URINARY TRACT INFECTION: Status: ACTIVE | Noted: 2018-10-02

## 2018-10-02 LAB
ABO GROUP BLD: NORMAL
ABO GROUP BLD: NORMAL
ALBUMIN SERPL BCP-MCNC: 1.5 G/DL (ref 3.5–5)
ALP SERPL-CCNC: 138 U/L (ref 46–116)
ALT SERPL W P-5'-P-CCNC: 33 U/L (ref 12–78)
ANION GAP SERPL CALCULATED.3IONS-SCNC: 4 MMOL/L (ref 4–13)
APTT PPP: 30 SECONDS (ref 24–36)
AST SERPL W P-5'-P-CCNC: 74 U/L (ref 5–45)
BACTERIA UR QL AUTO: ABNORMAL /HPF
BASOPHILS # BLD MANUAL: 0.41 THOUSAND/UL (ref 0–0.1)
BASOPHILS NFR MAR MANUAL: 2 % (ref 0–1)
BILIRUB SERPL-MCNC: 0.5 MG/DL (ref 0.2–1)
BILIRUB UR QL STRIP: ABNORMAL
BLD GP AB SCN SERPL QL: NEGATIVE
BUN SERPL-MCNC: 66 MG/DL (ref 5–25)
CALCIUM SERPL-MCNC: 9 MG/DL (ref 8.3–10.1)
CHLORIDE SERPL-SCNC: 100 MMOL/L (ref 100–108)
CLARITY UR: ABNORMAL
CO2 SERPL-SCNC: 31 MMOL/L (ref 21–32)
COLOR UR: ABNORMAL
CREAT SERPL-MCNC: 2.79 MG/DL (ref 0.6–1.3)
EOSINOPHIL # BLD MANUAL: 0.41 THOUSAND/UL (ref 0–0.4)
EOSINOPHIL NFR BLD MANUAL: 2 % (ref 0–6)
ERYTHROCYTE [DISTWIDTH] IN BLOOD BY AUTOMATED COUNT: 17.1 % (ref 11.6–15.1)
FINE GRAN CASTS URNS QL MICRO: ABNORMAL /LPF
GFR SERPL CREATININE-BSD FRML MDRD: 24 ML/MIN/1.73SQ M
GLUCOSE SERPL-MCNC: 102 MG/DL (ref 65–140)
GLUCOSE UR STRIP-MCNC: NEGATIVE MG/DL
HCT VFR BLD AUTO: 25.3 % (ref 36.5–49.3)
HGB BLD-MCNC: 8 G/DL (ref 12–17)
HGB UR QL STRIP.AUTO: ABNORMAL
INR PPP: 1.25 (ref 0.86–1.16)
KETONES UR STRIP-MCNC: NEGATIVE MG/DL
LACTATE SERPL-SCNC: 1.4 MMOL/L (ref 0.5–2)
LACTATE SERPL-SCNC: 3.3 MMOL/L (ref 0.5–2)
LEUKOCYTE ESTERASE UR QL STRIP: ABNORMAL
LG PLATELETS BLD QL SMEAR: PRESENT
LYMPHOCYTES # BLD AUTO: 0.21 THOUSAND/UL (ref 0.6–4.47)
LYMPHOCYTES # BLD AUTO: 1 % (ref 14–44)
MACROCYTES BLD QL AUTO: PRESENT
MCH RBC QN AUTO: 34.5 PG (ref 26.8–34.3)
MCHC RBC AUTO-ENTMCNC: 31.6 G/DL (ref 31.4–37.4)
MCV RBC AUTO: 109 FL (ref 82–98)
METAMYELOCYTES NFR BLD MANUAL: 2 % (ref 0–1)
MONOCYTES # BLD AUTO: 0.41 THOUSAND/UL (ref 0–1.22)
MONOCYTES NFR BLD: 2 % (ref 4–12)
MUCOUS THREADS UR QL AUTO: ABNORMAL
NEUTROPHILS # BLD MANUAL: 18.87 THOUSAND/UL (ref 1.85–7.62)
NEUTS BAND NFR BLD MANUAL: 51 % (ref 0–8)
NEUTS SEG NFR BLD AUTO: 40 % (ref 43–75)
NITRITE UR QL STRIP: NEGATIVE
NON-SQ EPI CELLS URNS QL MICRO: ABNORMAL /HPF
NRBC BLD AUTO-RTO: 0 /100 WBCS
PH UR STRIP.AUTO: >=9 [PH] (ref 5–9)
PLATELET # BLD AUTO: 106 THOUSANDS/UL (ref 149–390)
PLATELET BLD QL SMEAR: ABNORMAL
PMV BLD AUTO: 12.2 FL (ref 8.9–12.7)
POTASSIUM SERPL-SCNC: 4.9 MMOL/L (ref 3.5–5.3)
PROCALCITONIN SERPL-MCNC: 10.72 NG/ML
PROT SERPL-MCNC: 6.4 G/DL (ref 6.4–8.2)
PROT UR STRIP-MCNC: ABNORMAL MG/DL
PROTHROMBIN TIME: 13 SECONDS (ref 9.4–11.7)
RBC # BLD AUTO: 2.32 MILLION/UL (ref 3.88–5.62)
RBC #/AREA URNS AUTO: ABNORMAL /HPF
RBC MORPH BLD: PRESENT
RH BLD: POSITIVE
RH BLD: POSITIVE
SODIUM SERPL-SCNC: 135 MMOL/L (ref 136–145)
SP GR UR STRIP.AUTO: 1.01 (ref 1–1.03)
SPECIMEN EXPIRATION DATE: NORMAL
TOTAL CELLS COUNTED SPEC: 100
TRI-PHOS CRY URNS QL MICRO: ABNORMAL /HPF
UROBILINOGEN UR QL STRIP.AUTO: 0.2 E.U./DL
WBC # BLD AUTO: 20.74 THOUSAND/UL (ref 4.31–10.16)
WBC #/AREA URNS AUTO: ABNORMAL /HPF
WBC TOXIC VACUOLES BLD QL SMEAR: PRESENT

## 2018-10-02 PROCEDURE — 87186 SC STD MICRODIL/AGAR DIL: CPT | Performed by: EMERGENCY MEDICINE

## 2018-10-02 PROCEDURE — 86901 BLOOD TYPING SEROLOGIC RH(D): CPT | Performed by: EMERGENCY MEDICINE

## 2018-10-02 PROCEDURE — 87631 RESP VIRUS 3-5 TARGETS: CPT | Performed by: EMERGENCY MEDICINE

## 2018-10-02 PROCEDURE — 96366 THER/PROPH/DIAG IV INF ADDON: CPT

## 2018-10-02 PROCEDURE — 81001 URINALYSIS AUTO W/SCOPE: CPT | Performed by: EMERGENCY MEDICINE

## 2018-10-02 PROCEDURE — 85610 PROTHROMBIN TIME: CPT | Performed by: EMERGENCY MEDICINE

## 2018-10-02 PROCEDURE — 87040 BLOOD CULTURE FOR BACTERIA: CPT | Performed by: EMERGENCY MEDICINE

## 2018-10-02 PROCEDURE — 99285 EMERGENCY DEPT VISIT HI MDM: CPT

## 2018-10-02 PROCEDURE — 96368 THER/DIAG CONCURRENT INF: CPT

## 2018-10-02 PROCEDURE — 71045 X-RAY EXAM CHEST 1 VIEW: CPT

## 2018-10-02 PROCEDURE — 99223 1ST HOSP IP/OBS HIGH 75: CPT | Performed by: INTERNAL MEDICINE

## 2018-10-02 PROCEDURE — 84145 PROCALCITONIN (PCT): CPT | Performed by: EMERGENCY MEDICINE

## 2018-10-02 PROCEDURE — 87147 CULTURE TYPE IMMUNOLOGIC: CPT | Performed by: EMERGENCY MEDICINE

## 2018-10-02 PROCEDURE — 96365 THER/PROPH/DIAG IV INF INIT: CPT

## 2018-10-02 PROCEDURE — 87081 CULTURE SCREEN ONLY: CPT | Performed by: INTERNAL MEDICINE

## 2018-10-02 PROCEDURE — 86850 RBC ANTIBODY SCREEN: CPT | Performed by: EMERGENCY MEDICINE

## 2018-10-02 PROCEDURE — 36415 COLL VENOUS BLD VENIPUNCTURE: CPT | Performed by: EMERGENCY MEDICINE

## 2018-10-02 PROCEDURE — 83605 ASSAY OF LACTIC ACID: CPT | Performed by: EMERGENCY MEDICINE

## 2018-10-02 PROCEDURE — 85027 COMPLETE CBC AUTOMATED: CPT | Performed by: EMERGENCY MEDICINE

## 2018-10-02 PROCEDURE — 85730 THROMBOPLASTIN TIME PARTIAL: CPT | Performed by: EMERGENCY MEDICINE

## 2018-10-02 PROCEDURE — 87086 URINE CULTURE/COLONY COUNT: CPT | Performed by: EMERGENCY MEDICINE

## 2018-10-02 PROCEDURE — 87077 CULTURE AEROBIC IDENTIFY: CPT | Performed by: EMERGENCY MEDICINE

## 2018-10-02 PROCEDURE — 87147 CULTURE TYPE IMMUNOLOGIC: CPT | Performed by: INTERNAL MEDICINE

## 2018-10-02 PROCEDURE — 85007 BL SMEAR W/DIFF WBC COUNT: CPT | Performed by: EMERGENCY MEDICINE

## 2018-10-02 PROCEDURE — 80053 COMPREHEN METABOLIC PANEL: CPT | Performed by: EMERGENCY MEDICINE

## 2018-10-02 PROCEDURE — 86900 BLOOD TYPING SEROLOGIC ABO: CPT | Performed by: EMERGENCY MEDICINE

## 2018-10-02 PROCEDURE — 96361 HYDRATE IV INFUSION ADD-ON: CPT

## 2018-10-02 RX ORDER — CHOLECALCIFEROL (VITAMIN D3) 125 MCG
5 CAPSULE ORAL
Status: DISCONTINUED | OUTPATIENT
Start: 2018-10-02 | End: 2018-10-02

## 2018-10-02 RX ORDER — FAMOTIDINE 40 MG/5ML
40 POWDER, FOR SUSPENSION ORAL 2 TIMES DAILY
Status: DISCONTINUED | OUTPATIENT
Start: 2018-10-02 | End: 2018-10-05 | Stop reason: DRUGHIGH

## 2018-10-02 RX ORDER — VANCOMYCIN HYDROCHLORIDE 1 G/200ML
15 INJECTION, SOLUTION INTRAVENOUS EVERY 24 HOURS
Status: DISCONTINUED | OUTPATIENT
Start: 2018-10-03 | End: 2018-10-03

## 2018-10-02 RX ORDER — VANCOMYCIN HYDROCHLORIDE 1 G/200ML
15 INJECTION, SOLUTION INTRAVENOUS ONCE
Status: COMPLETED | OUTPATIENT
Start: 2018-10-02 | End: 2018-10-02

## 2018-10-02 RX ORDER — LACOSAMIDE 50 MG/1
100 TABLET ORAL EVERY 12 HOURS SCHEDULED
Status: DISCONTINUED | OUTPATIENT
Start: 2018-10-02 | End: 2018-10-12 | Stop reason: HOSPADM

## 2018-10-02 RX ORDER — BISACODYL 10 MG
10 SUPPOSITORY, RECTAL RECTAL DAILY PRN
Status: DISCONTINUED | OUTPATIENT
Start: 2018-10-02 | End: 2018-10-12

## 2018-10-02 RX ORDER — QUETIAPINE FUMARATE 25 MG/1
50 TABLET, FILM COATED ORAL
Status: DISCONTINUED | OUTPATIENT
Start: 2018-10-02 | End: 2018-10-10

## 2018-10-02 RX ORDER — POLYETHYLENE GLYCOL 3350 17 G/17G
17 POWDER, FOR SOLUTION ORAL DAILY
Status: DISCONTINUED | OUTPATIENT
Start: 2018-10-03 | End: 2018-10-09

## 2018-10-02 RX ORDER — LANOLIN ALCOHOL/MO/W.PET/CERES
6 CREAM (GRAM) TOPICAL
Status: DISCONTINUED | OUTPATIENT
Start: 2018-10-02 | End: 2018-10-05

## 2018-10-02 RX ORDER — HEPARIN SODIUM 5000 [USP'U]/ML
5000 INJECTION, SOLUTION INTRAVENOUS; SUBCUTANEOUS EVERY 8 HOURS SCHEDULED
Status: DISCONTINUED | OUTPATIENT
Start: 2018-10-02 | End: 2018-10-12 | Stop reason: HOSPADM

## 2018-10-02 RX ORDER — SODIUM CHLORIDE, SODIUM GLUCONATE, SODIUM ACETATE, POTASSIUM CHLORIDE, MAGNESIUM CHLORIDE, SODIUM PHOSPHATE, DIBASIC, AND POTASSIUM PHOSPHATE .53; .5; .37; .037; .03; .012; .00082 G/100ML; G/100ML; G/100ML; G/100ML; G/100ML; G/100ML; G/100ML
100 INJECTION, SOLUTION INTRAVENOUS CONTINUOUS
Status: DISCONTINUED | OUTPATIENT
Start: 2018-10-02 | End: 2018-10-05

## 2018-10-02 RX ORDER — LACTOBACILLUS ACIDOPHILUS / LACTOBACILLUS BULGARICUS 100 MILLION CFU STRENGTH
1 GRANULES ORAL 2 TIMES DAILY
Status: DISCONTINUED | OUTPATIENT
Start: 2018-10-02 | End: 2018-10-12 | Stop reason: HOSPADM

## 2018-10-02 RX ORDER — QUETIAPINE FUMARATE 25 MG/1
25 TABLET, FILM COATED ORAL EVERY MORNING
Status: DISCONTINUED | OUTPATIENT
Start: 2018-10-03 | End: 2018-10-10

## 2018-10-02 RX ADMIN — VANCOMYCIN HYDROCHLORIDE 1000 MG: 1 INJECTION, SOLUTION INTRAVENOUS at 14:10

## 2018-10-02 RX ADMIN — CEFEPIME HYDROCHLORIDE 2000 MG: 2 INJECTION, SOLUTION INTRAVENOUS at 13:17

## 2018-10-02 RX ADMIN — QUETIAPINE 50 MG: 25 TABLET ORAL at 21:34

## 2018-10-02 RX ADMIN — METRONIDAZOLE 500 MG: 500 INJECTION, SOLUTION INTRAVENOUS at 22:42

## 2018-10-02 RX ADMIN — FAMOTIDINE 40 MG: 40 POWDER, FOR SUSPENSION ORAL at 22:38

## 2018-10-02 RX ADMIN — LACTOBACILLUS ACIDOPHILUS / LACTOBACILLUS BULGARICUS 1 PACKET: 100 MILLION CFU STRENGTH GRANULES at 21:34

## 2018-10-02 RX ADMIN — HEPARIN SODIUM 5000 UNITS: 5000 INJECTION, SOLUTION INTRAVENOUS; SUBCUTANEOUS at 21:34

## 2018-10-02 RX ADMIN — METRONIDAZOLE 500 MG: 500 INJECTION, SOLUTION INTRAVENOUS at 14:07

## 2018-10-02 RX ADMIN — LACOSAMIDE 100 MG: 50 TABLET, FILM COATED ORAL at 21:34

## 2018-10-02 RX ADMIN — SODIUM CHLORIDE 1000 ML: 0.9 INJECTION, SOLUTION INTRAVENOUS at 13:11

## 2018-10-02 RX ADMIN — SODIUM CHLORIDE, SODIUM GLUCONATE, SODIUM ACETATE, POTASSIUM CHLORIDE, MAGNESIUM CHLORIDE, SODIUM PHOSPHATE, DIBASIC, AND POTASSIUM PHOSPHATE 125 ML/HR: .53; .5; .37; .037; .03; .012; .00082 INJECTION, SOLUTION INTRAVENOUS at 19:08

## 2018-10-02 RX ADMIN — SODIUM CHLORIDE 1000 ML: 0.9 INJECTION, SOLUTION INTRAVENOUS at 11:56

## 2018-10-02 RX ADMIN — MELATONIN TAB 3 MG 6 MG: 3 TAB at 21:34

## 2018-10-02 NOTE — ASSESSMENT & PLAN NOTE
· Likely secondary to acute illness/septic shock     · Baseline Cr: 0 9-1  · Cr on admission: 2 79 peaked 3 4  · Current Cr: 1 99 yesterday  · Diuresed with Lasix on 10/5 and 10/6 due to concern for volume overload  · Nephrology Recommendations Appreciated

## 2018-10-02 NOTE — ASSESSMENT & PLAN NOTE
· Resolved  · Continues with bandemia   · WBC on admission: 20 74  · Current WBC: 9 9  · Will monitor with daily CBC

## 2018-10-02 NOTE — ED NOTES
BP moved from leg to arm,  BP noted to be 40pts higher on arm       Unique Shaffer RN  10/02/18 5332

## 2018-10-02 NOTE — ED PROVIDER NOTES
History  Chief Complaint   Patient presents with    Weakness - Generalized     pt presents to ED with c/o "renal failure, UTI, and general weakness" from [facility]       History provided by:  Caregiver   used: No         Patient with history of Down syndrome, seizure disorder presents with caregiver for evaluation of possible renal failure, anemia, UTI  Patient is a verbal   All of history taken from staff, medical records  Per staff, patient with more fatigue, lethargy than usual   Outpatient laboratory studies reportedly with anemia, renal failure, UTI  Level 5 caveat due to patient's condition of Down syndrome, averbal     Prior to Admission Medications   Prescriptions Last Dose Informant Patient Reported? Taking?    Docusate Sodium 50 MG/15ML LIQD  Outside Facility (Specify) Yes Yes   Si mg every morning Via G tube    Melatonin 5 MG TABS  Outside Facility (Specify) Yes Yes   Si mg by Per G Tube route daily at bedtime     Mouthwashes ([facility]) LIQD  Outside Facility (Specify) Yes Yes   Sig: Apply to the mouth or throat 2 (two) times a day   Nutritional Supplements (NUTREN 1 5 PO)  Outside Facility (Specify) Yes Yes   Sig: Take by mouth Give at 45 ml/ hour until 1035 is infused in 22 hours with H2O flush of 30 ml/hr   QUEtiapine (SEROquel) 25 mg tablet  Outside Facility (Specify) Yes Yes   Si mg by Per G Tube route every morning     QUEtiapine (SEROquel) 50 mg tablet  Outside Facility (Specify) Yes Yes   Si mg by Per G Tube route daily at bedtime     bisacodyl (DULCOLAX) 10 mg suppository  Outside Facility (Specify) Yes Yes   Sig: Insert 10 mg into the rectum daily as needed for constipation   famotidine (PEPCID) 40 mg/5 mL suspension  Outside Facility (Specify) Yes Yes   Si mg by Per G Tube route 2 (two) times a day     ketoconazole (NIZORAL) 2 % cream  Outside Facility (Specify) Yes Yes   Sig: Apply 1 application topically 2 (two) times a day To face     lacosamide (VIMPAT) 50 mg  Outside Facility (Specify) Yes Yes   Si mg by Per G Tube route every 12 (twelve) hours     lactobacillus acidophilus-bulgaricus (FLORANEX) tablet  Outside Facility (Specify) Yes Yes   Sig: Take 2 tablets by mouth 2 (two) times a day Via G-tube    polyethylene glycol (MIRALAX) 17 g packet  Outside Facility ([address] [place] [address]) Yes Yes   Si g daily Via G-tube    silver sulfadiazine (SILVADENE,SSD) 1 % cream  Outside Facility ([address] [place] [address]) Yes Yes   Sig: Apply topically 2 (two) times a day      Facility-Administered Medications: None       Past Medical History:   Diagnosis Date    Constipation     Down syndrome     Dysphagia     has a peg tube    Fall     Folliculitis     Fracture of left forearm     Gastritis     GERD (gastroesophageal reflux disease)     Impulse control disorder     [facility])     s/p right mandibular fracture  s/p tracheostomy    Left leg DVT ([facility])     Pericardial effusion     Pericardial effusion     had a pericardial window     Pleural effusion, bilateral     Pneumonia     Seizure ([address]     [facility]     new onset        Past Surgical History:   Procedure Laterality Date    DENTAL REHABILITATION      EXPLORATORY LAPAROTOMY      for abdominal pain    PEG TUBE PLACEMENT      TRACHEOSTOMY      temporary after fall and jaw fracture -weaned off vent and trach removed       History reviewed  No pertinent family history  I have reviewed and agree with the history as documented  Social History   Substance Use Topics    Smoking status: Never Smoker    Smokeless tobacco: Never Used    Alcohol use No        Review of Systems   Unable to perform ROS: Patient nonverbal       Physical Exam  Physical Exam   Constitutional: He appears well-developed and well-nourished  No distress  HENT:   Head: Normocephalic and atraumatic     Right Ear: External ear normal    Left Ear: External ear normal    Eyes: Pupils are equal, round, and reactive to light  EOM are normal  Right eye exhibits no discharge  Left eye exhibits no discharge  Neck: Normal range of motion  Neck supple  No tracheal deviation present  No thyromegaly present  Cardiovascular: Normal rate and regular rhythm  No murmur heard  Pulmonary/Chest:   Coarse/crackles to righ tlung   Abdominal: Soft  Bowel sounds are normal  He exhibits no distension  There is no tenderness  Indwelling gallagher catheter   Musculoskeletal: Normal range of motion  He exhibits no edema or deformity  Neurological: He is alert  No cranial nerve deficit  He exhibits normal muscle tone  Skin: Skin is warm  Capillary refill takes less than 2 seconds  He is not diaphoretic  Psychiatric: He has a normal mood and affect  His behavior is normal    Nursing note and vitals reviewed        Vital Signs  ED Triage Vitals [10/02/18 1132]   Temperature Pulse Respirations Blood Pressure SpO2   (!) 95 6 °F (35 3 °C) 87 (!) 24 90/58 95 %      Temp Source Heart Rate Source Patient Position - Orthostatic VS BP Location FiO2 (%)   Tympanic Monitor Lying Left arm --      Pain Score       --           Vitals:    10/02/18 1745 10/02/18 1800 10/02/18 1843 10/02/18 1900   BP: (!) 88/53 97/54 113/59 109/56   Pulse: 72 64 71 69   Patient Position - Orthostatic VS:   Lying        Visual Acuity      ED Medications  Medications   bisacodyl (DULCOLAX) rectal suppository 10 mg (not administered)   famotidine (PEPCID) oral suspension 40 mg (not administered)   lacosamide (VIMPAT) tablet 100 mg (not administered)   lactobacillus acidophilus-bulgaricus (FLORANEX) packet 1 packet (not administered)   polyethylene glycol (MIRALAX) packet 17 g (not administered)   QUEtiapine (SEROquel) tablet 25 mg (not administered)   QUEtiapine (SEROquel) tablet 50 mg (not administered)   multi-electrolyte (ISOLYTE-S PH 7 4 equivalent) IV solution (125 mL/hr Intravenous New Bag 10/2/18 1908)   heparin (porcine) subcutaneous injection 5,000 Units (not administered)   cefepime (MAXIPIME) IVPB (premix) 1,000 mg (not administered)   vancomycin (VANCOCIN) IVPB (premix) 1,000 mg (not administered)   metroNIDAZOLE (FLAGYL) IVPB (premix) 500 mg (not administered)   melatonin tablet 6 mg (not administered)   sodium chloride 0 9 % bolus 1,000 mL (0 mL Intravenous Stopped 10/2/18 1311)   vancomycin (VANCOCIN) IVPB (premix) 1,000 mg (0 mg/kg × 60 8 kg Intravenous Stopped 10/2/18 1621)   cefepime (MAXIPIME) 2 g/50 mL dextrose IVPB (0 mg Intravenous Stopped 10/2/18 1450)   metroNIDAZOLE (FLAGYL) IVPB (premix) 500 mg (0 mg Intravenous Stopped 10/2/18 1435)   sodium chloride 0 9 % bolus 1,000 mL (0 mL Intravenous Stopped 10/2/18 1435)       Diagnostic Studies  Results Reviewed     Procedure Component Value Units Date/Time    Sputum culture and Gram stain [[id number]]     Lab Status:  No result Specimen:  Sputum from Expectorated Sputum     Procalcitonin [[id number]]  (Abnormal) Collected:  10/02/18 1156    Lab Status:  Final result Specimen:  Blood from Arm, Left Updated:  10/02/18 1645     Procalcitonin 10 72 (H) ng/ml     Lactic acid x2 [[id number]]  (Normal) Collected:  10/02/18 1400    Lab Status:  Final result Specimen:  Blood from Arm, Left Updated:  10/02/18 1449     LACTIC ACID 1 4 mmol/L     Narrative:         Result may be elevated if tourniquet was used during collection  CBC and differential [[id number]]  (Abnormal) Collected:  10/02/18 1155    Lab Status:  Final result Specimen:  Blood from Arm, Left Updated:  10/02/18 1259     WBC 20 74 (H) Thousand/uL      RBC 2 32 (L) Million/uL      Hemoglobin 8 0 (L) g/dL      Hematocrit 25 3 (L) %       (H) fL      MCH 34 5 (H) pg      MCHC 31 6 g/dL      RDW 17 1 (H) %      MPV 12 2 fL      Platelets 934 (L) Thousands/uL      nRBC 0 /100 WBCs     Narrative: This is an appended report  These results have been appended to a previously verified report      Lactic acid x2 [[id number]]  (Abnormal) Collected:  10/02/18 1155    Lab Status:  Final result Specimen:  Blood from Arm, Left Updated:  10/02/18 1247     LACTIC ACID 3 3 (HH) mmol/L     Narrative:         Result may be elevated if tourniquet was used during collection  Urine Microscopic [[id number]]  (Abnormal) Collected:  10/02/18 1205    Lab Status:  Final result Specimen:  Urine from Urine, Indwelling Godoy Catheter Updated:  10/02/18 1235     RBC, UA       Field obscured, unable to enumerate (A)     /hpf     WBC, UA Innumerable (A) /hpf      Epithelial Cells Innumerable (A) /hpf      Bacteria, UA Innumerable (A) /hpf      Fine granular casts 4-5 /lpf      Triplep Phos April, UA Moderate /hpf      MUCOUS THREADS Moderate (A)    Urine culture [[id number]] Collected:  10/02/18 1205    Lab Status: In process Specimen:  Urine from Urine, Indwelling Godoy Catheter Updated:  10/02/18 1235    Comprehensive metabolic panel [[id number]]  (Abnormal) Collected:  10/02/18 1156    Lab Status:  Final result Specimen:  Blood from Arm, Left Updated:  10/02/18 1235     Sodium 135 (L) mmol/L      Potassium 4 9 mmol/L      Chloride 100 mmol/L      CO2 31 mmol/L      ANION GAP 4 mmol/L      BUN 66 (H) mg/dL      Creatinine 2 79 (H) mg/dL      Glucose 102 mg/dL      Calcium 9 0 mg/dL      AST 74 (H) U/L      ALT 33 U/L      Alkaline Phosphatase 138 (H) U/L      Total Protein 6 4 g/dL      Albumin 1 5 (L) g/dL      Total Bilirubin 0 50 mg/dL      eGFR 24 ml/min/1 73sq m     Narrative:         National Kidney Disease Education Program recommendations are as follows:  GFR calculation is accurate only with a steady state creatinine  Chronic Kidney disease less than 60 ml/min/1 73 sq  meters  Kidney failure less than 15 ml/min/1 73 sq  meters      Protime-INR [[id number]]  (Abnormal) Collected:  10/02/18 1156    Lab Status:  Final result Specimen:  Blood from Arm, Left Updated:  10/02/18 1228     Protime 13 0 (H) seconds      INR 1 25 (H)    APTT [[id number]]  (Normal) Collected: 10/02/18 1156    Lab Status:  Final result Specimen:  Blood from Arm, Left Updated:  10/02/18 1228     PTT 30 seconds     UA w Reflex to Microscopic w Reflex to Culture [[id number]]  (Abnormal) Collected:  10/02/18 1205    Lab Status:  Final result Specimen:  Urine from Urine, Indwelling Godoy Catheter Updated:  10/02/18 1222     Color, UA [place]     Clarity, UA Cloudy     Specific [place], UA 1 010     pH, UA >=9 0     Leukocytes, UA Large (A)     Nitrite, UA Negative     Protein,  (2+) (A) mg/dl      Glucose, UA Negative mg/dl      Ketones, UA Negative mg/dl      Urobilinogen, UA 0 2 E U /dl      Bilirubin, UA Interference- unable to analyze (A)     Blood, UA Large (A)    Influenza A/B and RSV by PCR (Indicated for patients > 2 mo of age) [[id number]] Collected:  10/02/18 1204    Lab Status: In process Specimen:  Nasopharyngeal from Nasopharyngeal Swab Updated:  10/02/18 1211    Blood culture #2 [[id number]] Collected:  10/02/18 1156    Lab Status: In process Specimen:  Blood from Arm, Left Updated:  10/02/18 1210    Blood culture #1 [[id number]] Collected:  10/02/18 1204    Lab Status: In process Specimen:  Blood from Hand, Right Updated:  10/02/18 1210                 XR chest portable - 1 view   Final Result by Wanda Edwards MD (10/02 1250)      Diffuse pneumonia in the right lung  Suspected right-sided pleural effusion  Workstation performed: [id number]                    Procedures  Procedures       Phone Contacts  ED Phone Contact    ED Course                         Initial Sepsis Screening     [address] Name 10/02/18 1254                Is the patient's history suggestive of a new or worsening infection? (!)  Yes (Proceed)  -MI        Suspected source of infection pneumonia  -MI        Are two or more of the following signs & symptoms of infection both present and new to the patient?         Indicate SIRS criteria Hypothermia < 36C (96 8F); Tachypnea > 20 resp per min;Leukocytosis (WBC > 200 IJL)  -MI        If the answer is yes to both questions, suspicion of sepsis is present          If severe sepsis is present AND tissue hypoperfusion perists in the hour after fluid resuscitation or lactate > 4, the patient meets criteria for SEPTIC SHOCK          Are any of the following organ dysfunction criteria present within 6 hours of suspected infection and SIRS criteria that are NOT considered to be chronic conditions? (!)  Yes  -MI        Organ dysfunction Creatinine > 2 0 mg/dL; Lactate > 2 0 mmol/L  -MI        Date of presentation of severe sepsis          Time of presentation of severe sepsis          Tissue hypoperfusion persists in the hour after crystalloid fluid administration, evidenced, by either:          Was hypotension present within one hour of the conclusion of crystalloid fluid administration?         Date of presentation of septic shock          Time of presentation of septic shock            User Key  (r) = Recorded By, (t) = Taken By, (c) = Cosigned By    [address] Name Provider Type    MI Valentino Solomons, MD Physician           Default Flowsheet Data (last 720 hours)      Sepsis Reassess     Row Name 10/02/18 1849 10/02/18 1501                Repeat Volume Status and Tissue Perfusion Assessment Performed    Repeat Volume Status and Tissue Perfusion Assessment Performed Yes  -PP Yes  -MI          Volume Status and Tissue Perfusion Post Fluid Resuscitation- Must Document 5 of the Following 7:    Vital Signs Reviewed (HR, RR, BP, T) Yes    64, 18, 97/54  -PP Yes  -MI       Arterial Oxygen Saturation Reviewed (POx, SaO2 or SpO2) Yes (comment %)   91  -PP         Cardio Normal S1/S2; Regular rate and rhythm; No murmor; No rub or gallop  -PP Normal S1/S2; Regular rate and rhythm  -MI       Pulmonary Clear to auscultation;Normal effort  -PP Normal effort  -MI       Capillary Refill   Brisk  -MI       Peripheral Pulses Radial;Dorsalis Pedis; Posterior Tibialis  -PP Radial  -MI Peripheral Pulse +2  -PP +2  -MI       Dorsalis Pedis +2  -PP         Posterior Tibialis +2  -PP         Skin Warm  -PP Warm  -MI       Urine output assessed Adequate  -PP            *OR*   Intensive Monitoring- Must Document One of the Following Four *:    Vital Signs Reviewed           * Central Venous Pressure (CVP or RAP)           * Central Venous Oxygen (SVO2, ScvO2 or Oxygen saturation via central catheter)           * Bedside Cardiovascular US in IVC diameter and % collapse           * Passive Leg Raise OR Crystalloid Challenge             User Key  (r) = Recorded By, (t) = Taken By, (c) = Cosigned By    [address] Name Provider Type    PP Annamaria Carr MD Resident    COURTNEY Seo MD Physician                MDM  Number of Diagnoses or Management Options  Pneumonia: new and requires workup  Severe sepsis [facility]): new and requires workup  Diagnosis management comments: Patient with low temperature upon arrival, coarse cough, tachypnea  Sepsis order set used  Chest x-ray with right-sided pneumonia  Patient from facility, vancomycin/Zosyn initially ordered  Laboratory studies with elevated white blood cell count, elevated prolactin, elevated creatinine, low hemoglobin  Patient made code sepsis when creatinine elevation resulted due to end-organ damage  Patient given 2 L of IV fluids  Discussed with ICU team who saw patient emergency department  Requested changing Zosyn to cefepime/Flagyl  Patient with transient hypotension recorded in the chart  Patient is sleeping at this time, improved with manual blood pressure  Initially ordered Levophed due to systolic blood pressure less than 90 however did not need to start this in emergency department  Patient seen in ED by ICU team, agree to watch patient closely due to Down syndrome, a verbal nature, transient low blood pressure  Patient admitted to the ICU in series in serious but stable condition         Amount and/or Complexity of Data Reviewed  Clinical lab tests: reviewed and ordered  Tests in the radiology section of CPT®: ordered and reviewed  Tests in the medicine section of CPT®: ordered and reviewed  Discuss the patient with other providers: yes    Risk of Complications, Morbidity, and/or Mortality  Presenting problems: high  Management options: high    Patient Progress  Patient progress: improved    CritCare Time    Disposition  Final diagnoses:   Pneumonia   Severe sepsis ([facility] )     Time reflects when diagnosis was documented in both MDM as applicable and the Disposition within this note     Time User Action Codes Description Comment    10/2/2018  3:27 PM Rastafari Rumpf Add [J18 9] Pneumonia     10/2/2018  3:27 PM Rastafari Rumpf Add [A41 9,  R65 20] [facility] ([facility] )     10/2/2018  3:27 PM Rastafari Rumpf Modify [J18 9] Pneumonia     10/2/2018  3:27 PM Rastafari Rumpf Modify [A41 9,  R65 20] Severe sepsis ([facility] )     10/2/2018  5:27 PM Erika Deiters Modify [J18 9] Pneumonia     10/2/2018  5:27 PM Heri Chester Add [Z93 1] PEG (percutaneous endoscopic gastrostomy) status ([place] [address] )     10/2/2018  5:27 PM Erika Deiters Add [G40 909] Seizure disorder ([facility] )     10/2/2018  5:27 PM Erika Deiters Add [Q90 9] Down syndrome       ED Disposition     ED Disposition Condition Comment    Admit  Case was discussed with ICU PA and the patient's admission status was agreed to be Admission Status: inpatient status to the service of Dr Uma Soto           Follow-up Information    None         Current Discharge Medication List      CONTINUE these medications which have NOT CHANGED    Details   bisacodyl (DULCOLAX) 10 mg suppository Insert 10 mg into the rectum daily as needed for constipation      Docusate Sodium 50 MG/15ML LIQD 100 mg every morning Via G tube       famotidine (PEPCID) 40 mg/5 mL suspension 40 mg by Per G Tube route 2 (two) times a day        ketoconazole (NIZORAL) 2 % cream Apply 1 application topically 2 (two) times a day To face lacosamide (VIMPAT) 50 mg 100 mg by Per G Tube route every 12 (twelve) hours        lactobacillus acidophilus-bulgaricus (FLORANEX) tablet Take 2 tablets by mouth 2 (two) times a day Via G-tube       Melatonin 5 MG TABS 5 mg by Per G Tube route daily at bedtime        Mouthwashes (BIOTENE DRY MOUTH) LIQD Apply to the mouth or throat 2 (two) times a day      Nutritional Supplements (NUTREN 1 5 PO) Take by mouth Give at 45 ml/ hour until 1035 is infused in 22 hours with H2O flush of 30 ml/hr      polyethylene glycol (MIRALAX) 17 g packet 17 g daily Via G-tube       !! QUEtiapine (SEROquel) 25 mg tablet 25 mg by Per G Tube route every morning        !! QUEtiapine (SEROquel) 50 mg tablet 50 mg by Per G Tube route daily at bedtime        silver sulfadiazine (SILVADENE,SSD) 1 % cream Apply topically 2 (two) times a day       !! - Potential duplicate medications found  Please discuss with provider  No discharge procedures on file      ED Provider  Electronically Signed by           Davi Dia MD  10/02/18 8615

## 2018-10-02 NOTE — ASSESSMENT & PLAN NOTE
· Admitted 10/2 with sepsis secondary to UTI and Pneumonia  · Improved and transferred to the floor on 10/4 , and return early morning 10/5 with hypoxia and hypotension  · White count continues to improve  Procalcitonin improving  · Became hypotensive  after intubation  Required high dose pressors and steroids were added  Started on high-dose vitamin C and thiamine  · Echo on 10/5/18: EF: 60%, no significant valvular dysfunction, no findings that would suggest moderate or severe pulmonary hypertension    · Procalcitonin on admission:  · Current Procalcitonin: 1 09  · BCX x2: Proteus  · Sputum cx unimpressive  · Influenza A/B: Negative  · UCx:  E coli, Morganella morganii, beta-hemolytic Streptococcus group G  · Repeat CXR on 10/8/18:  · Worsening bilateral infiltrates, left side greater than right, and moderate-sized right pleural effusion  · Pressors have been weaned off  · Completed 7 day course of cefepime  · Patient was extubated yesterday

## 2018-10-02 NOTE — ASSESSMENT & PLAN NOTE
· Pt with chronic suprapubic catheter  · Catheter exchanged on 10/8  · UA on admission:   · + Leukocytes, Large Blood, Innumerable WBC/Bacteria  · UCx with E coli     · Pt completed 7 day course of cefepime

## 2018-10-02 NOTE — ED NOTES
Attempting to collect vitals however pt is combative and non cooperative       Dariela Lewis, ZAID  10/02/18 7188

## 2018-10-02 NOTE — ED NOTES
Pulse ox replaced on toe and BP cuff repostioned, kicking at staff, attendant at bedside     Pratik Torres RN  10/02/18 3676

## 2018-10-02 NOTE — H&P
History and Physical - Critical Care  Jean Pierre Dong 52 y o  male MRN: [id number]  Unit/Bed#: ED 04 Encounter: [id number]     Reason for Admission / Chief Complaint: Septic Shock     History of Present Illness:  Jean Pierre Dong is a 52 y o  male with a PMH of Down Syndrome, Impulse Control Disorder, and Seizures who presents with caretaker from [facility]) to the ED with a chief complaint of lethargy  Patient is nonverbal at baseline due to history of Down Syndrome and so history obtained from caregiver  States concern for increasing agitation, lethargy, and generalized weakness  Additionally, notes chronic gallagher catheter in place with a change in color and smell  History obtained from chart review and unobtainable from patient due to mental status, lack of cooperation and nonverbal at baseline  Past Medical History:  Past Medical History:   Diagnosis Date    Constipation     Down syndrome     Dysphagia     has a peg tube    Fall     Folliculitis     Fracture of left forearm     Gastritis     GERD (gastroesophageal reflux disease)     Impulse control disorder     Jaw fracture ([facility])     s/p right mandibular fracture  s/p tracheostomy    Left leg DVT ([facility])     Pericardial effusion     Pericardial effusion     had a pericardial window 2015    Pleural effusion, bilateral     Pneumonia     Seizure ([address]     Seizure ([address] )     new onset 2013        Past Surgical History:  Past Surgical History:   Procedure Laterality Date    DENTAL REHABILITATION      EXPLORATORY LAPAROTOMY      for abdominal pain    PEG TUBE PLACEMENT      TRACHEOSTOMY      temporary after fall and jaw fracture 2015-weaned off vent and trach removed        Past Family History:  History reviewed  No pertinent family history       Social History:  History   Smoking Status    Never Smoker   Smokeless Tobacco    Never Used     History   Alcohol Use No     History   Drug Use No     Marital Status: Single  Exercise History: N/A     Medications:  Current Facility-Administered Medications   Medication Dose Route Frequency    norepinephrine (LEVOPHED) 4 mg (STANDARD CONCENTRATION) IV in sodium chloride 0 9% 250 mL  1-30 mcg/min Intravenous Titrated     Home medications:  Prior to Admission medications    Medication Sig Start Date End Date Taking?  Authorizing Provider   bisacodyl (DULCOLAX) 10 mg suppository Insert 10 mg into the rectum daily as needed for constipation   Yes Historical Provider, MD   Docusate Sodium 50 MG/15ML LIQD 100 mg every morning Via G tube    Yes Historical Provider, MD   famotidine (PEPCID) 40 mg/5 mL suspension 40 mg by Per G Tube route 2 (two) times a day     Yes Historical Provider, MD   ketoconazole (NIZORAL) 2 % cream Apply 1 application topically 2 (two) times a day To face     Yes Historical Provider, MD   lacosamide (VIMPAT) 50 mg 100 mg by Per G Tube route every 12 (twelve) hours     Yes Historical Provider, MD   lactobacillus acidophilus-bulgaricus (FLORANEX) tablet Take 2 tablets by mouth 2 (two) times a day Via G-tube    Yes Historical Provider, MD   Melatonin 5 MG TABS 5 mg by Per G Tube route daily at bedtime     Yes Historical Provider, MD   Mouthwashes ([address] [place] [address]) LIQD Apply to the mouth or throat 2 (two) times a day   Yes Historical Provider, MD   Nutritional Supplements (NUTREN 1 5 PO) Take by mouth Give at 45 ml/ hour until 1035 is infused in 22 hours with H2O flush of 30 ml/hr   Yes Historical Provider, MD   polyethylene glycol (MIRALAX) 17 g packet 17 g daily Via G-tube    Yes Historical Provider, MD   QUEtiapine (SEROquel) 25 mg tablet 25 mg by Per G Tube route every morning     Yes Historical Provider, MD   QUEtiapine (SEROquel) 50 mg tablet 50 mg by Per G Tube route daily at bedtime     Yes Historical Provider, MD   silver sulfadiazine (SILVADENE,SSD) 1 % cream Apply topically 2 (two) times a day   Yes Historical Provider, MD   levETIRAcetam (KEPPRA) 100 mg/mL oral solution 250 mg by Per G Tube route 2 (two) times a day For 2 weeks then DC , started on   Than on 10/2 start 250 mg every bedtime   10/2/18  Historical Provider, MD   nystatin-triamcinolone [facility]) cream  3/14/18 10/2/18  Historical Provider, MD   ofloxacin (FLOXIN) 0 3 % otic solution  2/23/18 10/2/18  Historical Provider, MD   oseltamivir (TAMIFLU) 75 mg capsule  2/11/18 10/2/18  Historical Provider, MD   povidone-iodine (BETADINE) 10 % external solution Apply topically daily at bedtime  10/2/18  Historical Provider, MD     Allergies:  No Known Allergies     ROS:   Review of Systems   Unable to perform ROS: Patient nonverbal        Vitals:  Vitals:    10/02/18 1444 10/02/18 1515 10/02/18 1530 10/02/18 1600   BP: (!) 86/46 (!) 83/53 119/59 91/54   BP Location: Left leg Left leg     Pulse:  72 72 66   Resp:  (!) 33 (!) 34 19   Temp:       TempSrc:       SpO2:  94% 95% 94%   Weight:       Height:         Temperature:   Temp (24hrs), Av 6 °F (35 3 °C), Min:95 6 °F (35 3 °C), Max:95 6 °F (35 3 °C)    Current: Temperature: (!) 95 6 °F (35 3 °C)     Weights:   IBW: 63 8 kg  Body mass index is 21 63 kg/m²  Hemodynamic Monitoring:  N/A     Non-Invasive/Invasive Ventilation Settings:  Respiratory    Lab Data (Last 4 hours)    None         O2/Vent Data (Last 4 hours)    None              No results found for: PHART, JBE2LCB, PO2ART, OUF9SNW, M7QVUNEO, BEART, SOURCE  SpO2: SpO2: 94 %     Physical Exam:  Physical Exam   Constitutional: He appears well-developed and well-nourished  No distress  HENT:   Head: Normocephalic  Eyes: Pupils are equal, round, and reactive to light  Conjunctivae and EOM are normal  Right eye exhibits no discharge  Left eye exhibits no discharge  Neck: Normal range of motion  Neck supple  Cardiovascular: Normal rate, regular rhythm, normal heart sounds and intact distal pulses  Exam reveals no gallop and no friction rub  No murmur heard    Pulmonary/Chest: Effort normal  No respiratory distress  He has no wheezes  He has no rales  He exhibits no tenderness  Dullness to Percussion over Right Lung   Abdominal: Soft  Bowel sounds are normal  He exhibits no distension and no mass  There is no tenderness  There is no rebound and no guarding  PEG   Musculoskeletal: He exhibits deformity  He exhibits no edema or tenderness  Lymphadenopathy:     He has no cervical adenopathy  Neurological:   Hx of Down Syndrome   Skin: He is not diaphoretic  Nursing note and vitals reviewed  Labs:    Results from last 7 days  Lab Units 10/02/18  1155   WBC Thousand/uL 20 74*   HEMOGLOBIN g/dL 8 0*   HEMATOCRIT % 25 3*   PLATELETS Thousands/uL 106*   MONO PCT MAN % 2*       Results from last 7 days  Lab Units 10/02/18  1156   SODIUM mmol/L 135*   POTASSIUM mmol/L 4 9   CHLORIDE mmol/L 100   CO2 mmol/L 31   BUN mg/dL 66*   CREATININE mg/dL 2 79*   CALCIUM mg/dL 9 0   ALK PHOS U/L 138*   ALT U/L 33   AST U/L 74*                Results from last 7 days  Lab Units 10/02/18  1156   INR  1 25*   PTT seconds 30       Results from last 7 days  Lab Units 10/02/18  1400   LACTIC ACID mmol/L 1 4     No results found for: TROPONINI     Imaging:     XR chest portable - 1 view   Final Result      Diffuse pneumonia in the right lung  Suspected right-sided pleural effusion  Workstation performed: [id number]            I have personally reviewed pertinent reports  EKG: This was personally reviewed by myself  Micro:  Lab Results   Component Value Date    URINECX >100,000 cfu/ml Mixed Contaminants X6 09/16/2017       Assessment/Plan:    * Septic shock ([address] )   Assessment & Plan    · Likely secondary to Right Upper/Lower Lobe Pneumonia vs  UTI  · Vitals on admission: T: 95 6 / RR: 24 / BP: 90/58 / Lactic Acid: 3 3 / WBC: 20 74 / SpO2: 95% Room Air  · CXR on admission:   · Diffuse pneumonia in the right lung  Suspected right-sided pleural effusion    · UA on admission:   · + Leukocytes, Large Blood, Innumerable WBC/Bacteria  · BCX and UCx are Pending  · Lactic Acid Trend: 3 3 - 1 4  · s/p 2L NaCl 0 9% bolus  · BP dropped below 90 systolic  · IV Levophed 4mg started   · Titrate Accordingly  · Will hold placement of central line as patient status is clinically improving  · Obtain Sputum Cx and GS  · Start Isolyte 125cc/hr  · Start Vancomycin, Cefepime, and Flagyl       Pneumonia   Assessment & Plan    · CXR on admission:  · Diffuse pneumonia in the right lung  Suspected right-sided pleural effusion  · Likely secondary to Gram - vs  Gram + Bacteria additionally with concern for aspiration  · Start Vancomycin, Cefepime, and Flagyl       Urinary tract infection   Assessment & Plan    · UA on admission:   · + Leukocytes, Large Blood, Innumerable WBC/Bacteria  · UCx Pending  · Hx of Chronic [place] Catheter   · Will Change  · Continue ABx therapy As Above     Acute kidney injury ([facility] )   Assessment & Plan    · Baseline Cr: 0 9-1  · Cr on admission: 2 79  · Start Isolyte 125cc/hr  · Will monitor with daily BMP     Leukocytosis   Assessment & Plan    · WBC on admission: 20 74  · Will monitor with daily CBC     Seizure disorder ([facility] )   Assessment & Plan    · Continue home medications:   · Seroquel 25mg qd morning and 50mg qd bedtime     Down syndrome   Assessment & Plan    · At Baseline     Impulse control disorder   Assessment & Plan    · At Baseline  · Agitation Control prn          VTE Pharmacologic Prophylaxis: Heparin  VTE Mechanical Prophylaxis: sequential compression device     Invasive lines and devices: Invasive Devices     Peripheral Intravenous Line            Peripheral IV 10/02/18 Left Antecubital less than 1 day          Drain            Urethral Catheter 18 Fr  -- days                 Code Status: Full Code     Given critical illness, patient length of stay will require greater than two midnights       Counseling / Coordination of Care  Total Critical Care time spent 30 minutes excluding procedures, teaching and family updates                 Anoop Gray MD

## 2018-10-02 NOTE — ED NOTES
Pt presents to ED from [facility] with Mariela Ca, care specialist at bedside  Pt unable to have conversation at baseline  facesheet states "UTI, renal failure and generalized weakness " Mariela Ca states pt "has been more lethargic than normal " Upon assessment of pt, pt becomes agitated  Mariela Ca states "this is normally his baseline " resp even non-labored  Productive cough heard upon assessment  Unable to ambulate or pivot transfer  Redness noted to right leg  Cloudy [place] urine noted in chronic gallagher bag at this time  Physician at bedside  Mariela Ca and pt updated on plan of care  Denies questions or concerns at this time  Comfort and safety maintained  Will continue to monitor       Chago Wagner, ZAID  10/02/18 7345

## 2018-10-03 PROBLEM — F63.9 IMPULSE CONTROL DISORDER: Chronic | Status: ACTIVE | Noted: 2017-08-25

## 2018-10-03 PROBLEM — G40.909 SEIZURE DISORDER (HCC): Chronic | Status: ACTIVE | Noted: 2017-08-25

## 2018-10-03 PROBLEM — T83.511A URINARY TRACT INFECTION ASSOCIATED WITH CATHETERIZATION OF URINARY TRACT (HCC): Status: ACTIVE | Noted: 2018-10-02

## 2018-10-03 PROBLEM — L89.310 PRESSURE INJURY OF RIGHT BUTTOCK, UNSTAGEABLE (HCC): Status: ACTIVE | Noted: 2018-10-03

## 2018-10-03 PROBLEM — Q90.9 DOWN SYNDROME: Chronic | Status: ACTIVE | Noted: 2018-03-23

## 2018-10-03 LAB
ANION GAP SERPL CALCULATED.3IONS-SCNC: 7 MMOL/L (ref 4–13)
BASOPHILS # BLD MANUAL: 0.16 THOUSAND/UL (ref 0–0.1)
BASOPHILS NFR MAR MANUAL: 1 % (ref 0–1)
BUN SERPL-MCNC: 63 MG/DL (ref 5–25)
CALCIUM SERPL-MCNC: 8.5 MG/DL (ref 8.3–10.1)
CHLORIDE SERPL-SCNC: 104 MMOL/L (ref 100–108)
CO2 SERPL-SCNC: 26 MMOL/L (ref 21–32)
CREAT SERPL-MCNC: 2.69 MG/DL (ref 0.6–1.3)
EOSINOPHIL # BLD MANUAL: 0 THOUSAND/UL (ref 0–0.4)
EOSINOPHIL NFR BLD MANUAL: 0 % (ref 0–6)
ERYTHROCYTE [DISTWIDTH] IN BLOOD BY AUTOMATED COUNT: 16.6 % (ref 11.6–15.1)
FLUAV AG SPEC QL: NORMAL
FLUBV AG SPEC QL: NORMAL
GFR SERPL CREATININE-BSD FRML MDRD: 25 ML/MIN/1.73SQ M
GLUCOSE SERPL-MCNC: 106 MG/DL (ref 65–140)
GLUCOSE SERPL-MCNC: 68 MG/DL (ref 65–140)
GLUCOSE SERPL-MCNC: 70 MG/DL (ref 65–140)
HBV CORE AB SER QL: REACTIVE
HBV CORE IGM SER QL: ABNORMAL
HBV SURFACE AG SER QL: ABNORMAL
HCT VFR BLD AUTO: 23.5 % (ref 36.5–49.3)
HCV AB SER QL: ABNORMAL
HGB BLD-MCNC: 7.4 G/DL (ref 12–17)
LYMPHOCYTES # BLD AUTO: 0.32 THOUSAND/UL (ref 0.6–4.47)
LYMPHOCYTES # BLD AUTO: 2 % (ref 14–44)
MAGNESIUM SERPL-MCNC: 2.5 MG/DL (ref 1.6–2.6)
MCH RBC QN AUTO: 33.6 PG (ref 26.8–34.3)
MCHC RBC AUTO-ENTMCNC: 31.5 G/DL (ref 31.4–37.4)
MCV RBC AUTO: 107 FL (ref 82–98)
MONOCYTES # BLD AUTO: 0.48 THOUSAND/UL (ref 0–1.22)
MONOCYTES NFR BLD: 3 % (ref 4–12)
NEUTROPHILS # BLD MANUAL: 15.16 THOUSAND/UL (ref 1.85–7.62)
NEUTS BAND NFR BLD MANUAL: 18 % (ref 0–8)
NEUTS SEG NFR BLD AUTO: 76 % (ref 43–75)
NRBC BLD AUTO-RTO: 0 /100 WBCS
PHOSPHATE SERPL-MCNC: 3 MG/DL (ref 2.7–4.5)
PLATELET # BLD AUTO: 91 THOUSANDS/UL (ref 149–390)
PLATELET BLD QL SMEAR: ABNORMAL
PMV BLD AUTO: 12.1 FL (ref 8.9–12.7)
POTASSIUM SERPL-SCNC: 5.3 MMOL/L (ref 3.5–5.3)
RBC # BLD AUTO: 2.2 MILLION/UL (ref 3.88–5.62)
RBC MORPH BLD: PRESENT
RSV B RNA SPEC QL NAA+PROBE: NORMAL
SODIUM SERPL-SCNC: 137 MMOL/L (ref 136–145)
TARGETS BLD QL SMEAR: PRESENT
TOTAL CELLS COUNTED SPEC: 100
TOXIC GRANULES BLD QL SMEAR: PRESENT
WBC # BLD AUTO: 16.13 THOUSAND/UL (ref 4.31–10.16)
WBC TOXIC VACUOLES BLD QL SMEAR: PRESENT

## 2018-10-03 PROCEDURE — 99233 SBSQ HOSP IP/OBS HIGH 50: CPT | Performed by: INTERNAL MEDICINE

## 2018-10-03 PROCEDURE — 87340 HEPATITIS B SURFACE AG IA: CPT | Performed by: PHYSICIAN ASSISTANT

## 2018-10-03 PROCEDURE — 94760 N-INVAS EAR/PLS OXIMETRY 1: CPT

## 2018-10-03 PROCEDURE — 86705 HEP B CORE ANTIBODY IGM: CPT | Performed by: PHYSICIAN ASSISTANT

## 2018-10-03 PROCEDURE — 82948 REAGENT STRIP/BLOOD GLUCOSE: CPT

## 2018-10-03 PROCEDURE — 86704 HEP B CORE ANTIBODY TOTAL: CPT | Performed by: PHYSICIAN ASSISTANT

## 2018-10-03 PROCEDURE — 86803 HEPATITIS C AB TEST: CPT | Performed by: PHYSICIAN ASSISTANT

## 2018-10-03 PROCEDURE — 83735 ASSAY OF MAGNESIUM: CPT | Performed by: STUDENT IN AN ORGANIZED HEALTH CARE EDUCATION/TRAINING PROGRAM

## 2018-10-03 PROCEDURE — 85007 BL SMEAR W/DIFF WBC COUNT: CPT | Performed by: STUDENT IN AN ORGANIZED HEALTH CARE EDUCATION/TRAINING PROGRAM

## 2018-10-03 PROCEDURE — 80048 BASIC METABOLIC PNL TOTAL CA: CPT | Performed by: STUDENT IN AN ORGANIZED HEALTH CARE EDUCATION/TRAINING PROGRAM

## 2018-10-03 PROCEDURE — 84100 ASSAY OF PHOSPHORUS: CPT | Performed by: STUDENT IN AN ORGANIZED HEALTH CARE EDUCATION/TRAINING PROGRAM

## 2018-10-03 PROCEDURE — 85027 COMPLETE CBC AUTOMATED: CPT | Performed by: STUDENT IN AN ORGANIZED HEALTH CARE EDUCATION/TRAINING PROGRAM

## 2018-10-03 RX ADMIN — HEPARIN SODIUM 5000 UNITS: 5000 INJECTION, SOLUTION INTRAVENOUS; SUBCUTANEOUS at 13:42

## 2018-10-03 RX ADMIN — HEPARIN SODIUM 5000 UNITS: 5000 INJECTION, SOLUTION INTRAVENOUS; SUBCUTANEOUS at 21:55

## 2018-10-03 RX ADMIN — POLYETHYLENE GLYCOL 3350 17 G: 17 POWDER, FOR SOLUTION ORAL at 08:33

## 2018-10-03 RX ADMIN — FAMOTIDINE 40 MG: 40 POWDER, FOR SUSPENSION ORAL at 17:43

## 2018-10-03 RX ADMIN — SODIUM CHLORIDE, SODIUM GLUCONATE, SODIUM ACETATE, POTASSIUM CHLORIDE, MAGNESIUM CHLORIDE, SODIUM PHOSPHATE, DIBASIC, AND POTASSIUM PHOSPHATE 125 ML/HR: .53; .5; .37; .037; .03; .012; .00082 INJECTION, SOLUTION INTRAVENOUS at 03:39

## 2018-10-03 RX ADMIN — LACTOBACILLUS ACIDOPHILUS / LACTOBACILLUS BULGARICUS 1 PACKET: 100 MILLION CFU STRENGTH GRANULES at 08:33

## 2018-10-03 RX ADMIN — LACOSAMIDE 100 MG: 50 TABLET, FILM COATED ORAL at 08:33

## 2018-10-03 RX ADMIN — HEPARIN SODIUM 5000 UNITS: 5000 INJECTION, SOLUTION INTRAVENOUS; SUBCUTANEOUS at 06:01

## 2018-10-03 RX ADMIN — CEFEPIME HYDROCHLORIDE 1000 MG: 1 INJECTION, SOLUTION INTRAVENOUS at 13:25

## 2018-10-03 RX ADMIN — LACTOBACILLUS ACIDOPHILUS / LACTOBACILLUS BULGARICUS 1 PACKET: 100 MILLION CFU STRENGTH GRANULES at 17:42

## 2018-10-03 RX ADMIN — QUETIAPINE 50 MG: 25 TABLET ORAL at 21:31

## 2018-10-03 RX ADMIN — LACOSAMIDE 100 MG: 50 TABLET, FILM COATED ORAL at 21:31

## 2018-10-03 RX ADMIN — FAMOTIDINE 40 MG: 40 POWDER, FOR SUSPENSION ORAL at 08:33

## 2018-10-03 RX ADMIN — QUETIAPINE 25 MG: 25 TABLET ORAL at 08:36

## 2018-10-03 RX ADMIN — METRONIDAZOLE 500 MG: 500 INJECTION, SOLUTION INTRAVENOUS at 06:01

## 2018-10-03 RX ADMIN — MELATONIN TAB 3 MG 6 MG: 3 TAB at 21:31

## 2018-10-03 NOTE — PLAN OF CARE
Problem: DISCHARGE PLANNING - CARE MANAGEMENT  Goal: Discharge to post-acute care or home with appropriate resources  INTERVENTIONS:  - Conduct assessment to determine patient/family and health care team treatment goals, and need for post-acute services based on payer coverage, community resources, and patient preferences, and barriers to discharge  - Address psychosocial, clinical, and financial barriers to discharge as identified in assessment in conjunction with the patient/family and health care team  - Arrange appropriate level of post-acute services according to patient's   needs and preference and payer coverage in collaboration with the physician and health care team  - Communicate with and update the patient/family, physician, and health care team regarding progress on the discharge plan  - Arrange appropriate transportation to post-acute venues  Return to [address] [place] [address] when medically stable     Outcome: Progressing

## 2018-10-03 NOTE — PLAN OF CARE
Problem: Potential for Falls  Goal: Patient will remain free of falls  INTERVENTIONS:  - Assess patient frequently for physical needs  -  Identify cognitive and physical deficits and behaviors that affect risk of falls    -  [place] fall precautions as indicated by assessment   - Educate patient/family on patient safety including physical limitations  - Instruct patient to call for assistance with activity based on assessment  - Modify environment to reduce risk of injury  - Consider OT/PT consult to assist with strengthening/mobility   Outcome: Progressing      Problem: PAIN - ADULT  Goal: Verbalizes/displays adequate comfort level or baseline comfort level  Interventions:  - Encourage patient to monitor pain and request assistance  - Assess pain using appropriate pain scale  - Administer analgesics based on type and severity of pain and evaluate response  - Implement non-pharmacological measures as appropriate and evaluate response  - Consider cultural and social influences on pain and pain management  - Notify physician/advanced practitioner if interventions unsuccessful or patient reports new pain  Outcome: Progressing      Problem: INFECTION - ADULT  Goal: Absence or prevention of progression during hospitalization  INTERVENTIONS:  - Assess and monitor for signs and symptoms of infection  - Monitor lab/diagnostic results  - Monitor all insertion sites, i e  indwelling lines, tubes, and drains  - Monitor endotracheal (as able) and nasal secretions for changes in amount and color  - [place] appropriate cooling/warming therapies per order  - Administer medications as ordered  - Instruct and encourage patient and family to use good hand hygiene technique  - Identify and instruct in appropriate isolation precautions for identified infection/condition  Outcome: Progressing    Goal: Absence of fever/infection during neutropenic period  INTERVENTIONS:  - Monitor WBC  - Implement neutropenic guidelines  Outcome: Progressing      Problem: SAFETY ADULT  Goal: Maintain or return to baseline ADL function  INTERVENTIONS:  -  Assess patient's ability to carry out ADLs; assess patient's baseline for ADL function and identify physical deficits which impact ability to perform ADLs (bathing, care of mouth/teeth, toileting, grooming, dressing, etc )  - Assess/evaluate cause of self-care deficits   - Assess range of motion  - Assess patient's mobility; develop plan if impaired  - Assess patient's need for assistive devices and provide as appropriate  - Encourage maximum independence but intervene and supervise when necessary  ¯ Involve family in performance of ADLs  ¯ Assess for home care needs following discharge   ¯ Request OT consult to assist with ADL evaluation and planning for discharge  ¯ Provide patient education as appropriate  Outcome: Progressing    Goal: Maintain or return mobility status to optimal level  INTERVENTIONS:  - Assess patient's baseline mobility status (ambulation, transfers, stairs, etc )    - Identify cognitive and physical deficits and behaviors that affect mobility  - Identify mobility aids required to assist with transfers and/or ambulation (gait belt, sit-to-stand, lift, walker, cane, etc )  - [place] fall precautions as indicated by assessment  - Record patient progress and toleration of activity level on Mobility SBAR; progress patient to next Phase/Stage  - Instruct patient to call for assistance with activity based on assessment  - Request Rehabilitation consult to assist with strengthening/weightbearing, etc   Outcome: Progressing      Problem: DISCHARGE PLANNING  Goal: Discharge to home or other facility with appropriate resources  INTERVENTIONS:  - Identify barriers to discharge w/patient and caregiver  - Arrange for needed discharge resources and transportation as appropriate  - Identify discharge learning needs (meds, wound care, etc )  - Arrange for interpretive services to assist at discharge as needed  - Refer to Case Management Department for coordinating discharge planning if the patient needs post-hospital services based on physician/advanced practitioner order or complex needs related to functional status, cognitive ability, or social support system  Outcome: Progressing      Problem: Knowledge Deficit  Goal: Patient/family/caregiver demonstrates understanding of disease process, treatment plan, medications, and discharge instructions  Complete learning assessment and assess knowledge base  Interventions:  - Provide teaching at level of understanding  - Provide teaching via preferred learning methods  Outcome: Progressing      Problem: CARDIOVASCULAR - ADULT  Goal: Maintains optimal cardiac output and hemodynamic stability  INTERVENTIONS:  - Monitor I/O, vital signs and rhythm  - Monitor for S/S and trends of decreased cardiac output i e  bleeding, hypotension  - Administer and titrate ordered vasoactive medications to optimize hemodynamic stability  - Assess quality of pulses, skin color and temperature  - Assess for signs of decreased coronary artery perfusion - ex   Angina  - Instruct patient to report change in severity of symptoms  Outcome: Progressing      Problem: METABOLIC, FLUID AND ELECTROLYTES - ADULT  Goal: Electrolytes maintained within normal limits  INTERVENTIONS:  - Monitor labs and assess patient for signs and symptoms of electrolyte imbalances  - Administer electrolyte replacement as ordered  - Monitor response to electrolyte replacements, including repeat lab results as appropriate  - Instruct patient on fluid and nutrition as appropriate  Outcome: Progressing    Goal: Fluid balance maintained  INTERVENTIONS:  - Monitor labs and assess for signs and symptoms of volume excess or deficit  - Monitor I/O and WT  - Instruct patient on fluid and nutrition as appropriate  Outcome: Progressing      Problem: SKIN/TISSUE INTEGRITY - ADULT  Goal: Skin integrity remains intact  INTERVENTIONS  - Identify patients at risk for skin breakdown  - Assess and monitor skin integrity  - Assess and monitor nutrition and hydration status  - Monitor labs (i e  albumin)  - Assess for incontinence   - Turn and reposition patient  - Assist with mobility/ambulation  - Relieve pressure over bony prominences  - Avoid friction and shearing  - Provide appropriate hygiene as needed including keeping skin clean and dry  - Evaluate need for skin moisturizer/barrier cream  - Collaborate with interdisciplinary team (i e  Nutrition, Rehabilitation, etc )   - Patient/family teaching  Outcome: Progressing    Goal: Incision(s), wounds(s) or drain site(s) healing without S/S of infection  INTERVENTIONS  - Assess and document risk factors for skin impairment   - Assess and document dressing, incision, wound bed, drain sites and surrounding tissue  - Initiate Nutrition services consult and/or wound management as needed  Outcome: Progressing    Goal: Oral mucous membranes remain intact  INTERVENTIONS  - Assess oral mucosa and hygiene practices  - Implement preventative oral hygiene regimen  - Implement oral medicated treatments as ordered  - Initiate Nutrition services referral as needed  Outcome: Progressing

## 2018-10-03 NOTE — PLAN OF CARE
CARDIOVASCULAR - ADULT     Maintains optimal cardiac output and hemodynamic stability Progressing        DISCHARGE PLANNING     Discharge to home or other facility with appropriate resources Progressing        INFECTION - ADULT     Absence or prevention of progression during hospitalization Progressing     Absence of fever/infection during neutropenic period Progressing        Knowledge Deficit     Patient/family/caregiver demonstrates understanding of disease process, treatment plan, medications, and discharge instructions Progressing        METABOLIC, FLUID AND ELECTROLYTES - ADULT     Electrolytes maintained within normal limits Progressing     Fluid balance maintained Progressing        Nutrition/Hydration-ADULT     Nutrient/Hydration intake appropriate for improving, restoring or maintaining nutritional needs Progressing        PAIN - ADULT     Verbalizes/displays adequate comfort level or baseline comfort level Progressing        Potential for Falls     Patient will remain free of falls Progressing        Prexisting or High Potential for Compromised Skin Integrity     Skin integrity is maintained or improved Progressing        SAFETY ADULT     Maintain or return to baseline ADL function Progressing     Maintain or return mobility status to optimal level Progressing        SAFETY,RESTRAINT: NV/NON-SELF DESTRUCTIVE BEHAVIOR     Remains free of harm/injury (restraint for non violent/non self-detsructive behavior) Progressing     Returns to optimal restraint-free functioning Progressing        SKIN/TISSUE INTEGRITY - ADULT     Skin integrity remains intact Progressing     Incision(s), wounds(s) or drain site(s) healing without S/S of infection Progressing     Oral mucous membranes remain intact Progressing

## 2018-10-03 NOTE — CONSULTS
Due to septic shock, pt's calorie needs are increased from normal so may consider increasing EN to Jevity 1 5 at 50 mL/hour continuous  This will supply 1800 kcals, 77 grams of protein and 912 mL free water  Due to difference in tube feeding formula from home formula, recommend changing flushes to 150 mL q 4 hours to provide a total free water volume of 1812 mL  This should meet estimated needs

## 2018-10-03 NOTE — PLAN OF CARE
Problem: Nutrition/Hydration-ADULT  Goal: Nutrient/Hydration intake appropriate for improving, restoring or maintaining nutritional needs  Monitor and assess patient's nutrition/hydration status for malnutrition (ex- brittle hair, bruises, dry skin, pale skin and conjunctiva, muscle wasting, smooth red tongue, and disorientation)  Collaborate with interdisciplinary team and initiate plan and interventions as ordered  Monitor patient's weight and dietary intake as ordered or per policy  Utilize nutrition screening tool and intervene per policy  Determine patient's food preferences and provide high-protein, high-caloric foods as appropriate       INTERVENTIONS:  - Monitor intake, urinary output, labs, and treatment plans  - Assess nutrition and hydration status and recommend course of action  - Recommend/encourage appropriate nutritional supplements, and vitamin/mineral supplements  - Order, calculate, and assess calorie counts as needed  - Recommend, monitor, and adjust tube feedings based on assessed needs  - Assess need for intravenous fluids  - Provide specific nutrition/hydration education as appropriate  - Include patient/family/caregiver in decisions related to nutrition   Outcome: Progressing

## 2018-10-03 NOTE — PROGRESS NOTES
Pt hypothermic ,Blanca hugger applied,keeps kicking ,undressing himself hyperthermia machine applied Temp 94   Samantha Must aware

## 2018-10-03 NOTE — CASE MANAGEMENT
Initial Clinical Review    Admission: Date/Time/Statement: 10/2/18 @ 1528   Orders Placed This Encounter   Procedures    Inpatient Admission (expected length of stay for this patient is greater than two midnights)     Standing Status:   Standing     Number of Occurrences:   1     Order Specific Question:   Admitting Physician     Answer:   Collins Blum     Order Specific Question:   Level of Care     Answer:   Med Surg [16]     Order Specific Question:   Estimated length of stay     Answer:   More than 2 Midnights     Order Specific Question:   Certification     Answer:   I certify that inpatient services are medically necessary for this patient for a duration of greater than two midnights  See H&P and MD Progress Notes for additional information about the patient's course of treatment  ED: Date/Time/Mode of Arrival:   ED Arrival Information     Expected Arrival Acuity Means of Arrival Escorted By Service Admission Type    - 10/2/2018 11:22 Urgent Wheelchair [facility] Critical Care/ICU Urgent    Arrival Complaint    weakness          Chief Complaint:   Chief Complaint   Patient presents with    Weakness - Generalized     pt presents to ED with c/o "renal failure, UTI, and general weakness" from [facility]       History of Illness:   Zeinab Richmond is a 52 y o  male with a PMH of Down Syndrome, Impulse Control Disorder, and Seizures who presents with caretaker from [facility]) to the ED with a chief complaint of lethargy  Patient is nonverbal at baseline due to history of Down Syndrome and so history obtained from caregiver  States concern for increasing agitation, lethargy, and generalized weakness  Additionally, notes chronic gallagher catheter in place with a change in color and smell      ED Vital Signs:   ED Triage Vitals [10/02/18 1132]   Temperature Pulse Respirations Blood Pressure SpO2   (!) 95 6 °F (35 3 °C) 87 (!) 24 90/58 95 % Temp Source Heart Rate Source Patient Position - Orthostatic VS BP Location FiO2 (%)   Tympanic Monitor Lying Left arm --      Pain Score       --        Wt Readings from Last 1 Encounters:   10/03/18 61 kg (134 lb 7 7 oz)     Abnormal Labs/Diagnostic Test Results:   WBC Thousand/uL 20 74*   HEMOGLOBIN g/dL 8 0*   HEMATOCRIT % 25 3*   PLATELETS Thousands/uL 106*     SODIUM mmol/L 135*   POTASSIUM mmol/L 4 9   CHLORIDE mmol/L 100   CO2 mmol/L 31   BUN mg/dL 66*   CREATININE mg/dL 2 79*   CALCIUM mg/dL 9 0   ALK PHOS U/L 138*   ALT U/L 33   AST U/L 74*      Chest X: Diffuse pneumonia in the right lung  Suspected right-sided pleural effusion         ED Treatment:   Medication Administration from 10/02/2018 1122 to 10/02/2018 6888       Date/Time Order Dose Route Action Action by Comments     10/02/2018 1311 sodium chloride 0 9 % bolus 1,000 mL 0 mL Intravenous Stopped Steve Lawrence RN      10/02/2018 1156 sodium chloride 0 9 % bolus 1,000 mL 1,000 mL Intravenous [address] David Huertas RN      10/02/2018 1621 vancomycin (VANCOCIN) IVPB (premix) 1,000 mg 0 mg/kg Intravenous Stopped Steve Lawrence RN      10/02/2018 1410 vancomycin (VANCOCIN) IVPB (premix) 1,000 mg 1,000 mg Intravenous [address] Steve Lawrence RN      10/02/2018 1245 piperacillin-tazobactam (ZOSYN) IVPB 3 375 g 3 375 g Intravenous Not Given Steve Lawrence RN      10/02/2018 1450 cefepime (MAXIPIME) 2 g/50 mL dextrose IVPB 0 mg Intravenous Stopped Steve Lawrence RN      10/02/2018 1317 cefepime (MAXIPIME) 2 g/50 mL dextrose IVPB 2,000 mg Intravenous [address] Steve Lawrence RN      10/02/2018 1435 metroNIDAZOLE (FLAGYL) IVPB (premix) 500 mg 0 mg Intravenous Stopped Evin Carey RN      10/02/2018 1407 metroNIDAZOLE (FLAGYL) IVPB (premix) 500 mg 500 mg Intravenous [address] Steve Lawrence RN      10/02/2018 1435 sodium chloride 0 9 % bolus 1,000 mL 0 mL Intravenous Stopped Evin Carey RN      10/02/2018 1311 sodium chloride 0 9 % bolus 1,000 mL 1,000 mL Intravenous [address] Dev Mireles, [address] [facility]      10/02/2018 0449 norepinephrine (LEVOPHED) 4 mg (STANDARD CONCENTRATION) IV in sodium chloride 0 9% 250 mL 0 mcg/min Intravenous Hold Dev Mireles RN           Past Medical/Surgical History: Active Ambulatory Problems     Diagnosis Date Noted    Paraphimosis 08/25/2017    Seizure disorder ([facility] ) 08/25/2017    Impulse control disorder 08/25/2017    Agitation 08/25/2017    Down syndrome 03/23/2018     Resolved Ambulatory Problems     Diagnosis Date Noted    No Resolved Ambulatory Problems     Past Medical History:   Diagnosis Date    Constipation     Down syndrome     Dysphagia     Fall     Folliculitis     Fracture of left forearm     Gastritis     GERD (gastroesophageal reflux disease)     Impulse control disorder     Jaw fracture (HCC)     Left leg DVT (HCC)     Pericardial effusion     Pericardial effusion     Pleural effusion, bilateral     Pneumonia     Seizure (HCC)     Seizure ([facility])        Admitting Diagnosis: Down syndrome [Q90 9]  Pneumonia [J18 9]  Weakness [R53 1]  Seizure disorder ([facility] [address] ) [G40 909]  Se[facility] sepsis [facility] ) [A41 9, R65 20]  PEG (percutaneous endoscopic gastrostomy) status ([facility] [address] ) [Z93 1]    Age/Sex: 52 y o  male    Assessment/Plan:   * Septic shock ([facility] [address] )   Assessment & Plan     · Likely secondary to Right Upper/Lower Lobe Pneumonia vs  UTI  · Vitals on admission: T: 95 6 / RR: 24 / BP: 90/58 / Lactic Acid: 3 3 / WBC: 20 74 / SpO2: 95% Room Air  · CXR on admission:   ? Diffuse pneumonia in the right lung  Suspected right-sided pleural effusion  · UA on admission:   ? + Leukocytes, Large Blood, Innumerable WBC/Bacteria  · BCX and UCx are Pending  · Lactic Acid Trend: 3 3 - 1 4  ? s/p 2L NaCl 0 9% bolus  · BP dropped below 90 systolic  ? IV Levophed 4mg started   ?  Titrate Accordingly  · Will hold placement of central line as patient status is clinically improving  · Obtain Sputum Cx and GS  · Start Isolyte 125cc/hr  · Start Vancomycin, Cefepime, and Flagyl         Pneumonia   Assessment & Plan     · CXR on admission:  ? Diffuse pneumonia in the right lung  Suspected right-sided pleural effusion  · Likely secondary to Gram - vs  Gram + Bacteria additionally with concern for aspiration  · Start Vancomycin, Cefepime, and Flagyl         Urinary tract infection   Assessment & Plan     · UA on admission:   ? + Leukocytes, Large Blood, Innumerable WBC/Bacteria  · UCx Pending  · Hx of Chronic Godoy Catheter   ?  Will Change  · Continue ABx therapy As Above      Acute kidney injury ([facility] [address] )   Assessment & Plan     · Baseline Cr: 0 9-1  · Cr on admission: 2 79  · Start Isolyte 125cc/hr  · Will monitor with daily BMP      Leukocytosis   Assessment & Plan     · WBC on admission: 20 74  · Will monitor with daily CBC      Seizure disorder (HCC)   Assessment & Plan     · Continue home medications:   ? Seroquel 25mg qd morning and 50mg qd bedtime      Down syndrome   Assessment & Plan     · At Baseline      Impulse control disorder   Assessment & Plan     · At Baseline  · Agitation Control prn            VTE Pharmacologic Prophylaxis: Heparin  VTE Mechanical Prophylaxis: sequential compression device       Admission Orders:  Scheduled Meds:   Current Facility-Administered Medications:  bisacodyl 10 mg Rectal Daily PRN    cefepime 1,000 mg Intravenous Q24H    famotidine 40 mg Per G Tube BID    heparin (porcine) 5,000 Units Subcutaneous Q8H [address]    lacosamide 100 mg Per G Tube Q12H [address]    lactobacillus acidophilus-bulgaricus 1 packet Oral BID    melatonin 6 mg Oral HS    multi-electrolyte 125 mL/hr Intravenous Continuous Last Rate: 125 mL/hr (10/03/18 0339)   polyethylene glycol 17 g Per G Tube Daily    QUEtiapine 25 mg Per G Tube QAM    QUEtiapine 50 mg Per G Tube HS      Continuous Infusions:   multi-electrolyte 125 mL/hr Last Rate: 125 mL/hr (10/03/18 0339)

## 2018-10-03 NOTE — PROGRESS NOTES
Daily Progress Note - Critical Care/ Stepdown   Mendez Yen 52 y o  male MRN: [id number]  Unit/Bed#: ICU 09 Encounter: [id number]    ______________________________________________________________________  Assessment:   Principal Problem:    Septic shock ([facility] [address] )  Active Problems:    Pneumonia of right upper lobe due to infectious organism [facility])    Ac[facility] ([facility] )    Urinary tract infection associated with catheterization of urinary tract ([facility] )    Leukocytosis    Pressure injury of right buttock, unstageable ([facility] )    Seizure disorder ([facility] )    Impulse control disorder    Down syndrome  Resolved Problems:    * No resolved hospital problems  *      * Septic shock ([facility] )   Assessment & Plan    · Likely secondary to Right Upper/Lower Lobe Pneumonia vs  UTI  · Vitals on admission: T: 95 6 / RR: 24 / BP: 90/58 / Lactic Acid: 3 3 / WBC: 20 74 / SpO2: 95% Room Air  · CXR on admission:   · Diffuse pneumonia in the right lung  Suspected right-sided pleural effusion  · UA on admission:   · + Leukocytes, Large Blood, Innumerable WBC/Bacteria  · Lactic Acid Trend: 3 3 - 1 4  · s/p 2L NaCl 0 9% bolus  · BP dropped below 90 systolic  · IV Levophed 4mg was ordered, but placed on hold as BP was improving  · Placement of central line was not necessary as patient status is clinically improving  · Procalcitonin on admission: 10 72  · BCX x2: Gram Negative Ricky  · Influenza A/B: Negative  · Sputum Cx and GS: Pending  · UCx: Pending  · Vitals this AM with significant improvement since admission  · Continue Isolyte @ 75cc/hr  · Continue Day#2: Cefepime  · Will discontinue Vancomycin and Flagyl       Pneumonia of right upper lobe due to infectious organism [facility])   Assessment & Plan    · CXR on admission:  · Diffuse pneumonia in the right lung   Suspected right-sided pleural effusion  · Procalcitonin: 10 72  · Influenza A/B: Negative  · BCx x2: Gram Negative Rods  · Sputum CX/GS Pending  · Continue Day#2: Cefepime       Acute kidney injury [facility])   Assessment & Plan    · Baseline Cr: 0 9-1  · Cr on admission: 2 79  · Current Cr: 2 69  · Continue Isolyte 75cc/hr  · Will monitor with daily CMP     Urinary tract infection associated with catheterization of urinary tract (HCC)   Assessment & Plan    · UA on admission:   · + Leukocytes, Large Blood, Innumerable WBC/Bacteria  · UCx Pending  · Hx of Suprapubic Catheter   · Continue ABx therapy As Above     Leukocytosis   Assessment & Plan    · WBC on admission: 20 74  · Current WBC: 16 13  · Will monitor with daily CBC     Pressure injury of right buttock, unstageable (HCC)   Assessment & Plan    · Continue Daily Wound Care  · Frequent Turning and Offloading     Seizure disorder ([address] )   Assessment & Plan    · Continue home medications:   · Seroquel 25mg qd morning and 50mg qd bedtime     Down syndrome   Assessment & Plan    · At Baseline     Impulse control disorder   Assessment & Plan    · At Baseline  · Agitation Control prn         Plan:    Neuro:   · No Acute Issues  · Hx of Down Syndrome and Impulse Control Disorder  · Hx of Seizure:  · Continue Seroquel 25mg qd morning and 50mg qd bedtime  · Regulate sleep/wake cycle    CV:   · No Acute Issues  · Rhythm: NSR  · Follow rhythm on telemetry    Pulm:   · CXR concerning for RUL/RLL PNA  · SpO2: 92% Room Air  · Blood Cx x2: Gram - Rods  · Continue Day#2 Cefepime  · Discontinue Vancomycin and Flagyl  · SBT plan:  · Chest PT ordered: yes   · Chlorhexidine ordered: yes   · HOB >30 degrees: yes     GI:   · Nutrition/diet plan: Tube Feeding  · Stress ulcer prophylaxis: Pepcid PO  · Bowel regimen: Colace, Miralax and Dulcolax Suppository  · Last BM: Prior to Admission    FEN:   · Fluid/Diuretic plan: Isolyte 75cc/hr  · Goal 24 hour fluid balance: Euvolemic  · Electrolytes repleted: yes  · Goal: K >4 0, Mag >2 0, and Phos >3 0    :   · Chronic Suprapubic Catheter  · UTI  · UA: + Leuks, WBC/Bacteria  · UCx: Pending  · Continue Abx Therapy    ID:   · Blood Cx x2: Gram - Rods  · Abx ordered: Cefepime  · Day #2  · Trend temps and WBC count    Heme:   · Hb: 7 4  · MCV: 856  · Obtain Folic Acid, Vitamin L89  · Obtain FOBT  · Trend hgb and plts    Endo:   · Glycemic control plan: Blood glucose controlled on current regimen    Msk/Skin:  · Mobility goal: OOB  · PT consult: yes  · OT consult: yes  · Frequent turning and off-loading    Family:  · Family updated within 24 hours: yes   · Family meeting planned today: no     Lines:  · None    VTE Prophylaxis:  · Pharmacologic Prophylaxis: Heparin  · Mechanical Prophylaxis: sequential compression device    Disposition: Continue Stepdown    Code Status: Level 1 - Full Code    Counseling / Coordination of Care  Total Critical Care time spent 30 minutes excluding procedures, teaching and family updates  ______________________________________________________________________    HPI/24hr events:  Patient seen and examined at bedside  No acute events overnight  Blood pressure has normalized  ______________________________________________________________________    Physical Exam:   Physical Exam   Constitutional: He appears well-developed and well-nourished  No distress  HENT:   Head: Normocephalic  Neck: Normal range of motion  Cardiovascular: Normal rate, regular rhythm, normal heart sounds and intact distal pulses  Exam reveals no gallop and no friction rub  No murmur heard  Pulmonary/Chest: Effort normal and breath sounds normal  No respiratory distress  He has no wheezes  He has no rales  He exhibits no tenderness  Abdominal: Soft  Bowel sounds are normal  He exhibits no distension and no mass  There is no tenderness  There is no rebound and no guarding  No hernia  PEG   Genitourinary:   Genitourinary Comments: Chronic Suprapubic Catheter   Musculoskeletal: He exhibits deformity  He exhibits no edema or tenderness  Skin: Skin is warm  Capillary refill takes less than 2 seconds  He is not diaphoretic     Nursing note and vitals reviewed  _____________________________________________________________  Vitals:    10/03/18 0900 10/03/18 1000 10/03/18 1100 10/03/18 1130   BP: 103/51 102/57 94/53    BP Location:       Pulse: 72 64 61    Resp: (!) 23 17 16    Temp:    (!) 95 7 °F (35 4 °C)   TempSrc:    Temporal   SpO2: 92% 98% 98%    Weight:       Height:           Temperature:   Temp (24hrs), Av 2 °F (35 7 °C), Min:95 7 °F (35 4 °C), Max:96 4 °F (35 8 °C)    Current Temperature: (!) 95 7 °F (35 4 °C)    Weights:   IBW: 63 8 kg    Body mass index is 21 71 kg/m²  Weight (last 2 days)     Date/Time   Weight    10/03/18 0605  61 (134 48)    10/02/18 1843  57 7 (127 21)    10/02/18 1132  60 8 (134 04)            Hemodynamic Monitoring:  N/A     Non-Invasive/Invasive Ventilation Settings:  Respiratory    Lab Data (Last 4 hours)    None         O2/Vent Data (Last 4 hours)    None              No results found for: PHART, UXE1KLL, PO2ART, CKS9TTC, M4EIFFFP, BEART, SOURCE  SpO2: SpO2: 98 %    Intake and Outputs:  I/O       10/01 0701 - 10/02 0700 10/02 0701 - 10/03 0700 10/03 0701 - 10/04 0700    I V  (mL/kg)  1360 4 (22 3)     NG/GT  100     IV Piggyback  2350     Total Intake(mL/kg)  3810 4 (62 5)     Urine (mL/kg/hr)  1285     Total Output   1285      Net   +2525 4             Unmeasured Stool Occurrence  2 x         Nutrition:        Diet Orders            Start     Ordered    10/03/18 1042  Diet Enteral/Parenteral; Tube Feeding No Oral Diet; Jevity 1 5; Continuous; 45; 30; Water  Diet effective now     Comments:  Give at 45ml/hr until 1035 is infused in 22 hours with H2O flush of 30ml/hr   Question Answer Comment   Diet Type Enteral/Parenteral    Enteral/Parenteral Tube Feeding No Oral Diet    Tube Feeding Formula: Jevity 1 5    Bolus/Cyclic/Continuous Continuous    Tube Feeding Goal Rate (mL/hr): 45    Tube Feeding water flush (mL): 30    Water Flush type: Water    RD to adjust diet per protocol?  Yes        10/03/18 4935 10/02/18 1926  Room Service  Once     Question:  Type of Service  Answer:  Room Service- Not Appropriate    10/02/18 1925        Labs:     Results from last 7 days  Lab Units 10/03/18  0600 10/02/18  1155   WBC Thousand/uL 16 13* 20 74*   HEMOGLOBIN g/dL 7 4* 8 0*   HEMATOCRIT % 23 5* 25 3*   PLATELETS Thousands/uL 91* 106*   MONO PCT MAN % 3* 2*       Results from last 7 days  Lab Units 10/03/18  0600 10/02/18  1156   SODIUM mmol/L 137 135*   POTASSIUM mmol/L 5 3 4 9   CHLORIDE mmol/L 104 100   CO2 mmol/L 26 31   BUN mg/dL 63* 66*   CREATININE mg/dL 2 69* 2 79*   CALCIUM mg/dL 8 5 9 0   ALK PHOS U/L  --  138*   ALT U/L  --  33   AST U/L  --  74*       Results from last 7 days  Lab Units 10/03/18  0600   MAGNESIUM mg/dL 2 5     Lab Results   Component Value Date    PHOS 3 0 10/03/2018        Results from last 7 days  Lab Units 10/02/18  1156   INR  1 25*   PTT seconds 30     No results found for: TROPONINI    Results from last 7 days  Lab Units 10/02/18  1400 10/02/18  1155   LACTIC ACID mmol/L 1 4 3 3*     ABG:No results found for: PHART, NZO6LZV, PO2ART, VCD6MNH, M6OPYQBJ, BEART, SOURCE    Imaging: CXR:  I have personally reviewed pertinent reports  Micro:  Lab Results   Component Value Date    URINECX Culture results to follow   10/02/2018    URINECX >100,000 cfu/ml Mixed Contaminants X6 09/16/2017       Allergies: No Known Allergies  Medications:   Scheduled Meds:    Current Facility-Administered Medications:  bisacodyl 10 mg Rectal Daily PRN Bebeto Mcneil MD    cefepime 1,000 mg Intravenous Q24H Bebeto Mcneil MD    famotidine 40 mg Per G Tube BID Bebeto Mcneil MD    heparin (porcine) 5,000 Units Subcutaneous Q8H Karen Schumacher MD    lacosamide 100 mg Per G Tube Q12H Karen Schumacher MD    lactobacillus acidophilus-bulgaricus 1 packet Oral BID Bebeto Mcneil MD    melatonin 6 mg Oral HS Lacey Brand PA-C    multi-electrolyte 125 mL/hr Intravenous Continuous Bebeto Mcneil MD Last Rate: 125 mL/hr (10/03/18 0749)   polyethylene glycol 17 g Per G Tube Daily Meghan Logan MD    QUEtiapine 25 mg Per Shoaib Bah MD    QUEtiapine 50 mg Per G Tube HS Meghan Logan MD      Continuous Infusions:    multi-electrolyte 125 mL/hr Last Rate: 125 mL/hr (10/03/18 0339)     PRN Meds:    bisacodyl 10 mg Daily PRN       Invasive lines and devices:   Invasive Devices     Peripheral Intravenous Line            Peripheral IV 10/02/18 Left Antecubital less than 1 day          Drain            Gastrostomy/Enterostomy Percutaneous endoscopic gastrostomy (PEG) LLQ -- days    Suprapubic Catheter 18 Fr  -- days    Urethral Catheter 18 Fr  -- days                   SIGNATURE: Meghan Logan MD  DATE: October 3, 2018

## 2018-10-03 NOTE — SOCIAL WORK
DASH discussion completed  Discussed goals of making sure pt's needs are met upon discharge, pt's preferences are taken into account, pt understands her health condition, medications and symptoms to watch for after returning home and pt is aware of any follow up appointments recommended by hospital physician  Pt is a resident at [facility], plan for pt to return to the [facility] when medically stable for Dc

## 2018-10-04 ENCOUNTER — APPOINTMENT (INPATIENT)
Dept: RADIOLOGY | Facility: HOSPITAL | Age: 53
DRG: 871 | End: 2018-10-04
Payer: MEDICARE

## 2018-10-04 LAB
ALBUMIN SERPL BCP-MCNC: 1.2 G/DL (ref 3.5–5)
ALP SERPL-CCNC: 150 U/L (ref 46–116)
ALT SERPL W P-5'-P-CCNC: 28 U/L (ref 12–78)
ANION GAP SERPL CALCULATED.3IONS-SCNC: 7 MMOL/L (ref 4–13)
AST SERPL W P-5'-P-CCNC: 45 U/L (ref 5–45)
BILIRUB SERPL-MCNC: 0.6 MG/DL (ref 0.2–1)
BUN SERPL-MCNC: 60 MG/DL (ref 5–25)
CALCIUM SERPL-MCNC: 8.3 MG/DL (ref 8.3–10.1)
CHLORIDE SERPL-SCNC: 104 MMOL/L (ref 100–108)
CO2 SERPL-SCNC: 26 MMOL/L (ref 21–32)
CREAT SERPL-MCNC: 2.74 MG/DL (ref 0.6–1.3)
ERYTHROCYTE [DISTWIDTH] IN BLOOD BY AUTOMATED COUNT: 16.7 % (ref 11.6–15.1)
FOLATE SERPL-MCNC: 17.8 NG/ML (ref 3.1–17.5)
GFR SERPL CREATININE-BSD FRML MDRD: 25 ML/MIN/1.73SQ M
GLUCOSE SERPL-MCNC: 102 MG/DL (ref 65–140)
GLUCOSE SERPL-MCNC: 109 MG/DL (ref 65–140)
GLUCOSE SERPL-MCNC: 110 MG/DL (ref 65–140)
GLUCOSE SERPL-MCNC: 127 MG/DL (ref 65–140)
GLUCOSE SERPL-MCNC: 134 MG/DL (ref 65–140)
GLUCOSE SERPL-MCNC: 53 MG/DL (ref 65–140)
HCT VFR BLD AUTO: 22.4 % (ref 36.5–49.3)
HGB BLD-MCNC: 7.1 G/DL (ref 12–17)
MAGNESIUM SERPL-MCNC: 2.4 MG/DL (ref 1.6–2.6)
MCH RBC QN AUTO: 33.5 PG (ref 26.8–34.3)
MCHC RBC AUTO-ENTMCNC: 31.7 G/DL (ref 31.4–37.4)
MCV RBC AUTO: 106 FL (ref 82–98)
MRSA NOSE QL CULT: ABNORMAL
MRSA NOSE QL CULT: ABNORMAL
PHOSPHATE SERPL-MCNC: 3.7 MG/DL (ref 2.7–4.5)
PLATELET # BLD AUTO: 93 THOUSANDS/UL (ref 149–390)
PMV BLD AUTO: 12.1 FL (ref 8.9–12.7)
POTASSIUM SERPL-SCNC: 4.7 MMOL/L (ref 3.5–5.3)
PROT SERPL-MCNC: 5.8 G/DL (ref 6.4–8.2)
RBC # BLD AUTO: 2.12 MILLION/UL (ref 3.88–5.62)
SODIUM SERPL-SCNC: 137 MMOL/L (ref 136–145)
VIT B12 SERPL-MCNC: 4114 PG/ML (ref 100–900)
WBC # BLD AUTO: 13.51 THOUSAND/UL (ref 4.31–10.16)

## 2018-10-04 PROCEDURE — 84100 ASSAY OF PHOSPHORUS: CPT | Performed by: STUDENT IN AN ORGANIZED HEALTH CARE EDUCATION/TRAINING PROGRAM

## 2018-10-04 PROCEDURE — 94664 DEMO&/EVAL PT USE INHALER: CPT

## 2018-10-04 PROCEDURE — 82948 REAGENT STRIP/BLOOD GLUCOSE: CPT

## 2018-10-04 PROCEDURE — 82746 ASSAY OF FOLIC ACID SERUM: CPT | Performed by: STUDENT IN AN ORGANIZED HEALTH CARE EDUCATION/TRAINING PROGRAM

## 2018-10-04 PROCEDURE — 85027 COMPLETE CBC AUTOMATED: CPT | Performed by: STUDENT IN AN ORGANIZED HEALTH CARE EDUCATION/TRAINING PROGRAM

## 2018-10-04 PROCEDURE — 94760 N-INVAS EAR/PLS OXIMETRY 1: CPT

## 2018-10-04 PROCEDURE — 82607 VITAMIN B-12: CPT | Performed by: STUDENT IN AN ORGANIZED HEALTH CARE EDUCATION/TRAINING PROGRAM

## 2018-10-04 PROCEDURE — 82272 OCCULT BLD FECES 1-3 TESTS: CPT | Performed by: STUDENT IN AN ORGANIZED HEALTH CARE EDUCATION/TRAINING PROGRAM

## 2018-10-04 PROCEDURE — 83735 ASSAY OF MAGNESIUM: CPT | Performed by: STUDENT IN AN ORGANIZED HEALTH CARE EDUCATION/TRAINING PROGRAM

## 2018-10-04 PROCEDURE — 94640 AIRWAY INHALATION TREATMENT: CPT

## 2018-10-04 PROCEDURE — 99233 SBSQ HOSP IP/OBS HIGH 50: CPT | Performed by: INTERNAL MEDICINE

## 2018-10-04 PROCEDURE — 71045 X-RAY EXAM CHEST 1 VIEW: CPT

## 2018-10-04 PROCEDURE — 80053 COMPREHEN METABOLIC PANEL: CPT | Performed by: STUDENT IN AN ORGANIZED HEALTH CARE EDUCATION/TRAINING PROGRAM

## 2018-10-04 PROCEDURE — 99222 1ST HOSP IP/OBS MODERATE 55: CPT | Performed by: INTERNAL MEDICINE

## 2018-10-04 RX ORDER — IPRATROPIUM BROMIDE AND ALBUTEROL SULFATE 2.5; .5 MG/3ML; MG/3ML
3 SOLUTION RESPIRATORY (INHALATION)
Status: DISCONTINUED | OUTPATIENT
Start: 2018-10-04 | End: 2018-10-12 | Stop reason: HOSPADM

## 2018-10-04 RX ORDER — DEXTROSE MONOHYDRATE 25 G/50ML
25 INJECTION, SOLUTION INTRAVENOUS ONCE
Status: COMPLETED | OUTPATIENT
Start: 2018-10-04 | End: 2018-10-04

## 2018-10-04 RX ADMIN — IPRATROPIUM BROMIDE AND ALBUTEROL SULFATE 3 ML: .5; 3 SOLUTION RESPIRATORY (INHALATION) at 20:01

## 2018-10-04 RX ADMIN — HEPARIN SODIUM 5000 UNITS: 5000 INJECTION, SOLUTION INTRAVENOUS; SUBCUTANEOUS at 22:15

## 2018-10-04 RX ADMIN — SODIUM CHLORIDE, SODIUM GLUCONATE, SODIUM ACETATE, POTASSIUM CHLORIDE, MAGNESIUM CHLORIDE, SODIUM PHOSPHATE, DIBASIC, AND POTASSIUM PHOSPHATE 100 ML/HR: .53; .5; .37; .037; .03; .012; .00082 INJECTION, SOLUTION INTRAVENOUS at 13:02

## 2018-10-04 RX ADMIN — DEXTROSE MONOHYDRATE 25 ML: 25 INJECTION, SOLUTION INTRAVENOUS at 07:22

## 2018-10-04 RX ADMIN — CEFEPIME HYDROCHLORIDE 1000 MG: 1 INJECTION, SOLUTION INTRAVENOUS at 13:02

## 2018-10-04 RX ADMIN — HEPARIN SODIUM 5000 UNITS: 5000 INJECTION, SOLUTION INTRAVENOUS; SUBCUTANEOUS at 13:02

## 2018-10-04 RX ADMIN — LACTOBACILLUS ACIDOPHILUS / LACTOBACILLUS BULGARICUS 1 PACKET: 100 MILLION CFU STRENGTH GRANULES at 18:19

## 2018-10-04 RX ADMIN — MUPIROCIN 1 APPLICATION: 20 OINTMENT TOPICAL at 22:15

## 2018-10-04 RX ADMIN — MUPIROCIN 1 APPLICATION: 20 OINTMENT TOPICAL at 12:15

## 2018-10-04 RX ADMIN — HEPARIN SODIUM 5000 UNITS: 5000 INJECTION, SOLUTION INTRAVENOUS; SUBCUTANEOUS at 05:25

## 2018-10-04 RX ADMIN — SODIUM CHLORIDE, SODIUM GLUCONATE, SODIUM ACETATE, POTASSIUM CHLORIDE, MAGNESIUM CHLORIDE, SODIUM PHOSPHATE, DIBASIC, AND POTASSIUM PHOSPHATE 75 ML/HR: .53; .5; .37; .037; .03; .012; .00082 INJECTION, SOLUTION INTRAVENOUS at 02:05

## 2018-10-04 RX ADMIN — MELATONIN TAB 3 MG 6 MG: 3 TAB at 22:14

## 2018-10-04 RX ADMIN — QUETIAPINE 50 MG: 25 TABLET ORAL at 22:17

## 2018-10-04 RX ADMIN — LACOSAMIDE 100 MG: 50 TABLET, FILM COATED ORAL at 22:18

## 2018-10-04 RX ADMIN — QUETIAPINE 25 MG: 25 TABLET ORAL at 08:22

## 2018-10-04 RX ADMIN — LACTOBACILLUS ACIDOPHILUS / LACTOBACILLUS BULGARICUS 1 PACKET: 100 MILLION CFU STRENGTH GRANULES at 08:23

## 2018-10-04 RX ADMIN — LACOSAMIDE 100 MG: 50 TABLET, FILM COATED ORAL at 08:25

## 2018-10-04 RX ADMIN — FAMOTIDINE 40 MG: 40 POWDER, FOR SUSPENSION ORAL at 08:23

## 2018-10-04 RX ADMIN — SODIUM CHLORIDE, SODIUM GLUCONATE, SODIUM ACETATE, POTASSIUM CHLORIDE, MAGNESIUM CHLORIDE, SODIUM PHOSPHATE, DIBASIC, AND POTASSIUM PHOSPHATE 100 ML/HR: .53; .5; .37; .037; .03; .012; .00082 INJECTION, SOLUTION INTRAVENOUS at 23:21

## 2018-10-04 NOTE — PROGRESS NOTES
Transfer Note - ICU/Stepdown Transfer to [facility]   Elvin Ramos 52 y o  male MRN: [id number]  [address]   Unit/Bed#: ICU 09 Encounter: [id number]    Code Status: Level 1 - Full Code    Reason for ICU/Stepdown admission:  Septic shock secondary to RML/RLL Pneumonia    Active problems: Principal Problem:    Septic shock ([facility] )  Active Problems:    Pneumonia of right upper lobe due to infectious organism ([facility] )    Acute kidney injury ([facility] [address] )    Urinary tract infection associated with catheterization of urinary tract ([facility] [facility] )    Leukocytosis    Pressure injury of right buttock, unstageable ([facility] [address] )    [facility] ([facility] )    Impulse control disorder    Down syndrome  Resolved Problems:    * No resolved hospital problems  *      * Septic shock ([facility] [address] )   Assessment & Plan    · Likely secondary to Right Upper/Lower Lobe Pneumonia vs  UTI  · Vitals on admission: T: 95 6 / RR: 24 / BP: 90/58 / Lactic Acid: 3 3 / WBC: 20 74 / SpO2: 95% Room Air  · CXR on admission:   · Diffuse pneumonia in the right lung  Suspected right-sided pleural effusion  · UA on admission:   · + Leukocytes, Large Blood, Innumerable WBC/Bacteria  · Lactic Acid Trend: 3 3 - 1 4  · s/p 2L NaCl 0 9% bolus in ED  · BP dropped below 90 systolic  · IV Levophed 4mg was ordered, but placed on hold as BP was improving  · Placement of central line was not necessary as patient status is clinically improving  · Procalcitonin on admission: 10 72  · BCX x2: Gram Negative Ricky  · Influenza A/B: Negative  · Sputum Cx and GS: Pending  · UCx: Pending  · Vitals this AM with significant improvement since admission  · Continue Isolyte @ 75cc/hr  · Continue Day#3: Cefepime       Pneumonia of right upper lobe due to infectious organism [facility])   Assessment & Plan    · CXR on admission:  · Diffuse pneumonia in the right lung   Suspected right-sided pleural effusion  · Repeat CXR this AM:  · New Left Lower Lobe Consolidation  · Procalcitonin: 10 72  · Influenza A/B: Negative  · BCx x2: Gram Negative Rods  · Sputum CX/GS Pending  · Continue Day#3: Cefepime       Acute kidney injury ([facility] )   Assessment & Plan    · Baseline Cr: 0 9-1  · Cr on admission: 2 79  · Current Cr: 2 74  · Continue Isolyte 75cc/hr  · Will monitor with daily CMP  · Will Consult Nephrology     Urinary tract infection associated with catheterization of urinary tract (HCC)   Assessment & Plan    · UA on admission:   · + Leukocytes, Large Blood, Innumerable WBC/Bacteria  · UCx Pending  · Hx of Suprapubic Catheter   · Continue ABx therapy As Above     Leukocytosis   Assessment & Plan    · WBC on admission: 20 74  · Current WBC: 13 51  · Will monitor with daily CBC     Pressure injury of right buttock, unstageable (HCC)   Assessment & Plan    · POA  · Continue Daily Wound Care  · Frequent Turning and Offloading     Seizure disorder ([facility] )   Assessment & Plan    · Continue home medications:   · Seroquel 25mg qd morning and 50mg qd bedtime     Down syndrome   Assessment & Plan    · At Baseline     Impulse control disorder   Assessment & Plan    · At Baseline  · Agitation Control prn         Consultants:   · None    History of Present Illness/Summary of clinical course:    (as per H&P):  Yifan Vaca is a 52 y o  male with a PMH of Down Syndrome, Impulse Control Disorder, and Seizures who presents with caretaker from [facility]) to the ED with a chief complaint of lethargy  Patient is nonverbal at baseline due to history of Down Syndrome and so history obtained from caregiver  States concern for increasing agitation, lethargy, and generalized weakness  Additionally, notes chronic gallagher catheter in place with a change in color and smell  History obtained from chart review and unobtainable from patient due to mental status, lack of cooperation and nonverbal at baseline  Please refer to today's progress note for further clinical details       Recent or scheduled procedures: None    Outstanding/pending diagnostics: None       Mobilization Plan: OOB    Nutrition Plan: Tube Feeding; Nutrition Consult Placed    Discharge Plan: [facility]      Specific Diagnosis Plan:      [  ] Family aware of transfer out of critical care: yes       Spoke with Dr Sherine Leon regarding transfer @ 10:30am  Patient accepted to their service          Sidney Wolf MD

## 2018-10-04 NOTE — PROGRESS NOTES
Daily Progress Note - Critical Care/ Stepdown   Angelo Miners 52 y o  male MRN: [id number]  Unit/Bed#: ICU 09 Encounter: [id number]    ______________________________________________________________________  Assessment:   Principal Problem:    Septic shock ([facility] )  Active Problems:    Pneumonia of right upper lobe due to infectious organism [facility])    Acute kidney injury ([facility] )    Urinary tract infection associated with catheterization of urinary tract ([facility] )    Leukocytosis    Pressure injury of right buttock, unstageable ([facility] )    Seizure disorder ([facility] )    Impulse control disorder    Down syndrome  Resolved Problems:    * No resolved hospital problems  *      * Septic shock ([facility] )   Assessment & Plan    · Likely secondary to Right Upper/Lower Lobe Pneumonia vs  UTI  · Vitals on admission: T: 95 6 / RR: 24 / BP: 90/58 / Lactic Acid: 3 3 / WBC: 20 74 / SpO2: 95% Room Air  · CXR on admission:   · Diffuse pneumonia in the right lung  Suspected right-sided pleural effusion  · UA on admission:   · + Leukocytes, Large Blood, Innumerable WBC/Bacteria  · Lactic Acid Trend: 3 3 - 1 4  · s/p 2L NaCl 0 9% bolus in ED  · BP dropped below 90 systolic  · IV Levophed 4mg was ordered, but placed on hold as BP was improving  · Placement of central line was not necessary as patient status is clinically improving  · Procalcitonin on admission: 10 72  · BCX x2: Gram Negative Ricky  · Influenza A/B: Negative  · Sputum Cx and GS: Pending  · UCx: Pending  · Vitals this AM with significant improvement since admission  · Continue Isolyte @ 75cc/hr  · Continue Day#3: Cefepime       Pneumonia of right upper lobe due to infectious organism [facility])   Assessment & Plan    · CXR on admission:  · Diffuse pneumonia in the right lung   Suspected right-sided pleural effusion  · Repeat CXR this AM:  · New Left Lower Lobe Consolidation  · Procalcitonin: 10 72  · Influenza A/B: Negative  · BCx x2: Gram Negative Rods  · Sputum CX/GS Pending  · Continue Day#3: Cefepime       Acute kidney injury ([facility] [address] )   Assessment & Plan    · Baseline Cr: 0 9-1  · Cr on admission: 2 79  · Current Cr: 2 74  · Continue Isolyte 75cc/hr  · Will monitor with daily CMP  · Will Consult Nephrology     Urinary tract infection associated with catheterization of urinary tract (HCC)   Assessment & Plan    · UA on admission:   · + Leukocytes, Large Blood, Innumerable WBC/Bacteria  · UCx Pending  · Hx of Suprapubic Catheter   · Continue ABx therapy As Above     Leukocytosis   Assessment & Plan    · WBC on admission: 20 74  · Current WBC: 13 51  · Will monitor with daily CBC     Pressure injury of right buttock, unstageable (HCC)   Assessment & Plan    · POA  · Continue Daily Wound Care  · Frequent Turning and Offloading     Seizure disorder ([facility] )   Assessment & Plan    · Continue home medications:   · Seroquel 25mg qd morning and 50mg qd bedtime     Down syndrome   Assessment & Plan    · At Baseline     Impulse control disorder   Assessment & Plan    · At Baseline  · Agitation Control prn         Plan:    Neuro:   · No Acute Issues  · Hx of Down Syndrome and Impulse Control Disorder  · Seizure Disorder  · Continue Seroquel  · Regulate sleep/wake cycle    CV:   · No Acute Issues  · Rhythm: NSR  · Follow rhythm on telemetry    Pulm:   · CXR on admission: RUL/RLL PNA  · Repeat CXR this AM: New consolidation in LLL  · SpO2: 95% Room Air  · Blood Cx x2: Gram- Rods  · Continue Day#3 Cefepime  · SBT plan:   · Chest PT ordered: yes   · Chlorhexidine ordered: yes   · HOB >30 degrees: yes     GI:   · Nutrition/diet plan: Tube Feeding  · Stress ulcer prophylaxis: Pepcid PO  · Bowel regimen: Colace, Miralax and Dulcolax Suppository  · Last BM: This AM    FEN:   · Fluid/Diuretic plan: Isolyte 75cc/hr  · Goal 24 hour fluid balance: Euvolemic  · Electrolytes repleted: yes  · Goal: K >4 0, Mag >2 0, and Phos >3 0    :   · Chronic Suprapubic Catheter  · UTI:  · UCX Pending  · Continue Abx therapy    ID:   · Blood Cx x2: Gram - Rods  · Abx ordered: Cefepime  · Day #3  · Trend temps and WBC count    Heme:   · Hb: 7 4 - 7 1  · MCV: 106  · Folate and Vitamin B12 Pending  · Trend hgb and plts    Endo:   · Current Glucose: 53  · D50 given this AM  · Glycemic control plan: Blood glucose controlled on current regimen    Msk/Skin:  · Mobility goal: OOB  · PT consult: yes  · OT consult: yes  · Frequent turning and off-loading    Family:  · Family updated within 24 hours: yes   · Family meeting planned today: no     Lines:  · None    VTE Prophylaxis:  · Pharmacologic Prophylaxis: Heparin  · Mechanical Prophylaxis: sequential compression device    Disposition: Continue Stepdown    Code Status: Level 1 - Full Code    Counseling / Coordination of Care  Total Critical Care time spent 30 minutes excluding procedures, teaching and family updates  ______________________________________________________________________    HPI/24hr events:  Patient seen and examined at bedside  No acute events overnight     ______________________________________________________________________    Physical Exam:   Physical Exam   Constitutional: He appears well-developed and well-nourished  No distress  HENT:   Head: Normocephalic  Eyes: Pupils are equal, round, and reactive to light  Conjunctivae and EOM are normal  Right eye exhibits no discharge  Left eye exhibits no discharge  Neck: Normal range of motion  Cardiovascular: Normal rate, regular rhythm, normal heart sounds and intact distal pulses  Exam reveals no gallop and no friction rub  No murmur heard  Pulmonary/Chest: Effort normal  No respiratory distress  He has no wheezes  He has no rales  He exhibits no tenderness  Decreased Breath Sounds   Abdominal: Soft  Bowel sounds are normal  He exhibits no distension and no mass  There is no tenderness  There is no rebound and no guarding  No hernia     PEG   Genitourinary:   Genitourinary Comments: Chronic Suprapubic Catheter    Musculoskeletal: He exhibits deformity  Skin: Skin is warm  Capillary refill takes less than 2 seconds  He is not diaphoretic  Nursing note and vitals reviewed  ______________________________________________________________________  Vitals:    10/04/18 0400 10/04/18 0526 10/04/18 0600 10/04/18 0800   BP: 103/57  99/53 102/62   BP Location:    Left arm   Pulse: 75  74 89   Resp: (!) 23  18 (!) 32   Temp: (!) 96 °F (35 6 °C) (!) 96 3 °F (35 7 °C)     TempSrc: Rectal      SpO2: (!) 89%  95% (!) 76%   Weight:       Height:         Temperature:   Temp (24hrs), Av °F (35 °C), Min:93 °F (33 9 °C), Max:96 9 °F (36 1 °C)    Current Temperature: (!) 96 3 °F (35 7 °C)    Weights:   IBW: 63 8 kg    Body mass index is 21 71 kg/m²  Weight (last 2 days)     Date/Time   Weight    10/03/18 0605  61 (134 48)    10/02/18 1843  57 7 (127 21)    10/02/18 1132  60 8 (134 04)            Hemodynamic Monitoring:  N/A     Non-Invasive/Invasive Ventilation Settings:  Respiratory    Lab Data (Last 4 hours)    None         O2/Vent Data (Last 4 hours)    None              No results found for: PHART, NLN3FGP, PO2ART, ZYO5BDB, I5YZRKMS, BEART, SOURCE  SpO2: SpO2: (!) 76 %    Intake and Outputs:  I/O       10/02 0701 - 10/03 0700 10/03 0701 - 10/04 07    I V  (mL/kg) 1360 4 (22 3) 1967 9 (32 3)    NG/ 525    IV Piggyback 2350 50    Total Intake(mL/kg) 3810 4 (62 5) 2542 9 (41 7)    Urine (mL/kg/hr) 1285 1120 (0 8)    Total Output 1285 1120    Net +2525 4 +1422 9          Unmeasured Stool Occurrence 2 x 1 x        Nutrition:        Diet Orders            Start     Ordered    10/04/18 0715  Diet Enteral/Parenteral; Tube Feeding No Oral Diet; Jevity 1 5; Continuous; 50; 150;  Water; Every 4 hours  Diet effective now     Comments:  Give at 45ml/hr until 1035 is infused in 22 hours with H2O flush of 30ml/hr   Question Answer Comment   Diet Type Enteral/Parenteral    Enteral/Parenteral Tube Feeding No Oral Diet    Tube Feeding Formula: Jevity 1 5 Bolus/Cyclic/Continuous Continuous    Tube Feeding Goal Rate (mL/hr): 50    Tube Feeding water flush (mL): 150    Water Flush type: Water    Water flush frequency: Every 4 hours    RD to adjust diet per protocol? Yes        10/04/18 0715    10/02/18 1926  Room Service  Once     Question:  Type of Service  Answer:  Room Service- Not Appropriate    10/02/18 1925        Labs:     Results from last 7 days  Lab Units 10/04/18  0433 10/03/18  0600 10/02/18  1155   WBC Thousand/uL 13 51* 16 13* 20 74*   HEMOGLOBIN g/dL 7 1* 7 4* 8 0*   HEMATOCRIT % 22 4* 23 5* 25 3*   PLATELETS Thousands/uL 93* 91* 106*   MONO PCT MAN %  --  3* 2*       Results from last 7 days  Lab Units 10/04/18  0433 10/03/18  0600 10/02/18  1156   SODIUM mmol/L 137 137 135*   POTASSIUM mmol/L 4 7 5 3 4 9   CHLORIDE mmol/L 104 104 100   CO2 mmol/L 26 26 31   BUN mg/dL 60* 63* 66*   CREATININE mg/dL 2 74* 2 69* 2 79*   CALCIUM mg/dL 8 3 8 5 9 0   ALK PHOS U/L 150*  --  138*   ALT U/L 28  --  33   AST U/L 45  --  74*       Results from last 7 days  Lab Units 10/04/18  0433 10/03/18  0600   MAGNESIUM mg/dL 2 4 2 5     Lab Results   Component Value Date    PHOS 3 7 10/04/2018    PHOS 3 0 10/03/2018        Results from last 7 days  Lab Units 10/02/18  1156   INR  1 25*   PTT seconds 30     No results found for: TROPONINI    Results from last 7 days  Lab Units 10/02/18  1400 10/02/18  1155   LACTIC ACID mmol/L 1 4 3 3*     ABG:No results found for: PHART, CWP9YAK, PO2ART, SEO1JNV, C2XXGVBN, BEART, SOURCE    Imaging: CXR:  I have personally reviewed pertinent reports  Micro:  Lab Results   Component Value Date    BLOODCX Non lactose fermenting gram negative evon (A) 10/02/2018    BLOODCX Non lactose fermenting gram negative evon (A) 10/02/2018    URINECX Culture results to follow   10/02/2018    URINECX >100,000 cfu/ml Mixed Contaminants X6 09/16/2017       Allergies: No Known Allergies  Medications:   Scheduled Meds:    Current Facility-Administered Medications:  bisacodyl 10 mg Rectal Daily PRN Annamaria Carr MD    cefepime 1,000 mg Intravenous Q24H Annamaria Carr MD Last Rate: Stopped (10/03/18 1405)   famotidine 40 mg Per G Tube BID Annamaria Carr MD    heparin (porcine) 5,000 Units Subcutaneous [facility] Annamaria Carr MD    lacosamide 100 mg Per G Tube Q12H Kika Solares MD    lactobacillus acidophilus-bulgaricus 1 packet Oral BID Annamaria Carr MD    melatonin 6 mg Oral HS Kika Mi PA-C    multi-electrolyte 75 mL/hr Intravenous Continuous Antonio Del Castillo DO Last Rate: 75 mL/hr (10/04/18 0205)   polyethylene glycol 17 g Per G Tube Daily Annamaria Carr MD    QUEtiapine 25 mg Per [place] Peters MD    QUEtiapine 50 mg Per G Tube HS Annamaria Carr MD      Continuous Infusions:    multi-electrolyte 75 mL/hr Last Rate: 75 mL/hr (10/04/18 0205)     PRN Meds:    bisacodyl 10 mg Daily PRN       Invasive lines and devices:   Invasive Devices     Peripheral Intravenous Line            Peripheral IV 10/02/18 Left Antecubital 1 day    Long-Dwell Peripheral IV (Midline) 74/66/78 Right Basilic less than 1 day          Drain            Gastrostomy/Enterostomy Percutaneous endoscopic gastrostomy (PEG) LLQ -- days    Suprapubic Catheter 18 Fr  -- days    Urethral Catheter 18 Fr  -- days                   SIGNATURE: Annamaria Carr MD  DATE: October 4, 2018

## 2018-10-04 NOTE — CONSULTS
2           Consultation - Nephrology   Silvia García 52 y o  male MRN: [id number]  Unit/Bed#: ICU 09 Encounter: [id number]      Assessment/Plan     Assessment / Plan:  1  Renal  Patient's acute renal failure and he presents with gram-negative bacteremia  I suspect that he has volume depletion and potentially this could lead to ATN but he does have some urine output  For now will treat is if volume depleted with IV fluids that her isotonic  The patient was not oliguric yesterday  There is always a potential that he has mild ATN as well but for now will treat is if volume depleted  Increase IV fluids to 100 cc/hour  Monitor renal functions response and urine output over the next 24 hours    2  Gram-negative septicemia  This is likely related to urinary tract source  On antibiotics  Maintain hemodynamics support  History of Present Illness   Physician Requesting Consult: Prince Marin DO  Reason for Consult / Principal Problem:  Acute renal failure  Hx and PE limited by:   HPI: Silvia García is a 52y o  year old male who was referred in from [facility] for increasing lethargy and also he has a chronic Godoy catheter in the urine was beginning to smell according to the chart  Was found have a creatinine elevated above baseline we were asked to see him regarding acute renal failure  History obtained from chart review as patient is unable to give any history  Inpatient consult to Nephrology  Consult performed by: Gonzalez Calderon ordered by: Rojelio Lam          Review of Systems  No distress but patient gives no review of system for me given his clinical state    Historical Information   Patient Active Problem List   Diagnosis    Paraphimosis    Se[facility]ure disorder ([facility] )    Impulse control disorder    Agitation    Down syndrome    Septic shock ([facility] )    Acute kidney injury ([facility] )    Leukocytosis    Pneumonia of right upper lobe due to infectious organism [facility])    Urinary tract infection associated with catheterization of urinary tract ([facility] )    Pressure injury of right buttock, unstageable ([facility])     Past Medical History:   Diagnosis Date    Constipation     Down syndrome     Dysphagia     has a peg tube    Fall     Folliculitis     Fracture of left forearm     Gastritis     GERD (gastroesophageal reflux disease)     Impulse control disorder     Jaw fracture ([facility])     s/p right mandibular fracture  s/p tracheostomy    Left leg DVT ([facility])     Pericardial effusion     Pericardial effusion     had a pericardial window 2015    Pleural effusion, bilateral     Pneumonia     Seizure ([facility]     Seizure ([facility] )     new onset 2013     Past Surgical History:   Procedure Laterality Date    DENTAL REHABILITATION      EXPLORATORY LAPAROTOMY      for abdominal pain    PEG TUBE PLACEMENT      TRACHEOSTOMY      temporary after fall and jaw fracture 2015-weaned off vent and trach removed     Social History   History   Alcohol Use No     History   Drug Use No     History   Smoking Status    Never Smoker   Smokeless Tobacco    Never Used     History reviewed  No pertinent family history      Meds/Allergies   current meds:   Current Facility-Administered Medications   Medication Dose Route Frequency    bisacodyl (DULCOLAX) rectal suppository 10 mg  10 mg Rectal Daily PRN    cefepime (MAXIPIME) IVPB (premix) 1,000 mg  1,000 mg Intravenous Q24H    famotidine (PEPCID) oral suspension 40 mg  40 mg Per G Tube BID    heparin (porcine) subcutaneous injection 5,000 Units  5,000 Units Subcutaneous Q8H [address]    lacosamide (VIMPAT) tablet 100 mg  100 mg Per G Tube Q12H [address]    lactobacillus acidophilus-bulgaricus (FLORANEX) packet 1 packet  1 packet Oral BID    melatonin tablet 6 mg  6 mg Oral HS    multi-electrolyte (ISOLYTE-S PH 7 4 equivalent) IV solution  100 mL/hr Intravenous Continuous    polyethylene glycol (MIRALAX) packet 17 g  17 g Per G Tube Daily    QUEtiapine (SEROquel) tablet 25 mg 25 mg Per G Tube QAM    QUEtiapine (SEROquel) tablet 50 mg  50 mg Per G Tube HS     No Known Allergies    Objective     Intake/Output Summary (Last 24 hours) at 10/04/18 1030  Last data filed at 10/04/18 0901   Gross per 24 hour   Intake          2452 92 ml   Output             1020 ml   Net          1432 92 ml     Body mass index is 21 71 kg/m²  Invasive Devices:   Urethral Catheter 18 Fr  (Active)       PHYSICAL EXAM:  /62 (BP Location: Left arm)   Pulse 89   Temp (!) 96 3 °F (35 7 °C)   Resp (!) 32   Ht 5' 6" (1 676 m)   Wt 61 kg (134 lb 7 7 oz)   SpO2 (!) 76%   BMI 21 71 kg/m²     Physical Exam   Constitutional: No distress  Eyes: No scleral icterus  Neck: No JVD present  Cardiovascular: Exam reveals no friction rub  Tachycardic  No significant edema  Pulmonary/Chest: Effort normal  No respiratory distress  He has no rales  Abdominal: Soft  There is no tenderness           Current Weight: Weight - Scale: 61 kg (134 lb 7 7 oz)  First Weight: Weight - Scale: 60 8 kg (134 lb 0 6 oz)    Lab Results:      Results from last 7 days  Lab Units 10/04/18  0433   WBC Thousand/uL 13 51*   HEMOGLOBIN g/dL 7 1*   HEMATOCRIT % 22 4*   PLATELETS Thousands/uL 93*       Results from last 7 days  Lab Units 10/04/18  0433   SODIUM mmol/L 137   POTASSIUM mmol/L 4 7   CHLORIDE mmol/L 104   CO2 mmol/L 26   BUN mg/dL 60*   CREATININE mg/dL 2 74*   CALCIUM mg/dL 8 3       Results from last 7 days  Lab Units 10/04/18  0433   SODIUM mmol/L 137   POTASSIUM mmol/L 4 7   CHLORIDE mmol/L 104   CO2 mmol/L 26   BUN mg/dL 60*   CREATININE mg/dL 2 74*   CALCIUM mg/dL 8 3   ALK PHOS U/L 150*   ALT U/L 28   AST U/L 45

## 2018-10-04 NOTE — PLAN OF CARE
Problem: RESPIRATORY - ADULT  Goal: Achieves optimal ventilation and oxygenation  INTERVENTIONS:  - Assess for changes in respiratory status  - Assess for changes in mentation and behavior  - Position to facilitate oxygenation and minimize respiratory effort  - Oxygen administration by appropriate delivery method based on oxygen saturation (per order) or ABGs    - Encourage broncho-pulmonary hygiene including cough, deep breathe, Incentive Spirometry  - Assess the need for suctioning and aspirate as needed  - Assess and instruct to report SOB or any respiratory difficulty  - Respiratory Therapy support as indicated  Nebulizer therapy as ordered  Outcome: Progressing

## 2018-10-05 ENCOUNTER — APPOINTMENT (INPATIENT)
Dept: RADIOLOGY | Facility: HOSPITAL | Age: 53
DRG: 871 | End: 2018-10-05
Payer: MEDICARE

## 2018-10-05 ENCOUNTER — ANESTHESIA (OUTPATIENT)
Dept: GASTROENTEROLOGY | Facility: AMBULARY SURGERY CENTER | Age: 53
End: 2018-10-05

## 2018-10-05 ENCOUNTER — ANESTHESIA EVENT (OUTPATIENT)
Dept: GASTROENTEROLOGY | Facility: AMBULARY SURGERY CENTER | Age: 53
End: 2018-10-05

## 2018-10-05 ENCOUNTER — APPOINTMENT (INPATIENT)
Dept: NON INVASIVE DIAGNOSTICS | Facility: HOSPITAL | Age: 53
DRG: 871 | End: 2018-10-05
Payer: MEDICARE

## 2018-10-05 PROBLEM — J96.01 ACUTE RESPIRATORY FAILURE WITH HYPOXIA (HCC): Status: ACTIVE | Noted: 2018-10-05

## 2018-10-05 PROBLEM — J18.9 PNEUMONIA: Status: ACTIVE | Noted: 2018-10-02

## 2018-10-05 LAB
ALBUMIN SERPL BCP-MCNC: 1.6 G/DL (ref 3.5–5)
ALP SERPL-CCNC: 142 U/L (ref 46–116)
ALT SERPL W P-5'-P-CCNC: 21 U/L (ref 12–78)
ANION GAP SERPL CALCULATED.3IONS-SCNC: 6 MMOL/L (ref 4–13)
ANION GAP SERPL CALCULATED.3IONS-SCNC: 9 MMOL/L (ref 4–13)
ANISOCYTOSIS BLD QL SMEAR: PRESENT
ANISOCYTOSIS BLD QL SMEAR: PRESENT
ARTERIAL PATENCY WRIST A: YES
AST SERPL W P-5'-P-CCNC: 25 U/L (ref 5–45)
BACTERIA UR CULT: ABNORMAL
BASE EXCESS BLDA CALC-SCNC: 1.4 MMOL/L
BASE EXCESS BLDA CALC-SCNC: 1.6 MMOL/L
BASE EXCESS BLDA CALC-SCNC: 2 MMOL/L
BASE EXCESS BLDA CALC-SCNC: 2.2 MMOL/L
BASOPHILS # BLD MANUAL: 0 THOUSAND/UL (ref 0–0.1)
BASOPHILS # BLD MANUAL: 0.26 THOUSAND/UL (ref 0–0.1)
BASOPHILS NFR MAR MANUAL: 0 % (ref 0–1)
BASOPHILS NFR MAR MANUAL: 2 % (ref 0–1)
BILIRUB SERPL-MCNC: 0.6 MG/DL (ref 0.2–1)
BODY TEMPERATURE: 96.9 DEGREES FEHRENHEIT
BODY TEMPERATURE: 96.9 DEGREES FEHRENHEIT
BODY TEMPERATURE: 97 DEGREES FEHRENHEIT
BODY TEMPERATURE: 97.1 DEGREES FEHRENHEIT
BUN SERPL-MCNC: 61 MG/DL (ref 5–25)
BUN SERPL-MCNC: 61 MG/DL (ref 5–25)
CALCIUM SERPL-MCNC: 8 MG/DL (ref 8.3–10.1)
CALCIUM SERPL-MCNC: 8.2 MG/DL (ref 8.3–10.1)
CHLORIDE SERPL-SCNC: 106 MMOL/L (ref 100–108)
CHLORIDE SERPL-SCNC: 106 MMOL/L (ref 100–108)
CO2 SERPL-SCNC: 26 MMOL/L (ref 21–32)
CO2 SERPL-SCNC: 28 MMOL/L (ref 21–32)
CREAT SERPL-MCNC: 2.94 MG/DL (ref 0.6–1.3)
CREAT SERPL-MCNC: 2.99 MG/DL (ref 0.6–1.3)
EOSINOPHIL # BLD MANUAL: 0 THOUSAND/UL (ref 0–0.4)
EOSINOPHIL # BLD MANUAL: 0 THOUSAND/UL (ref 0–0.4)
EOSINOPHIL NFR BLD MANUAL: 0 % (ref 0–6)
EOSINOPHIL NFR BLD MANUAL: 0 % (ref 0–6)
ERYTHROCYTE [DISTWIDTH] IN BLOOD BY AUTOMATED COUNT: 17.2 % (ref 11.6–15.1)
ERYTHROCYTE [DISTWIDTH] IN BLOOD BY AUTOMATED COUNT: 17.5 % (ref 11.6–15.1)
FERRITIN SERPL-MCNC: 99 NG/ML (ref 8–388)
GFR SERPL CREATININE-BSD FRML MDRD: 22 ML/MIN/1.73SQ M
GFR SERPL CREATININE-BSD FRML MDRD: 23 ML/MIN/1.73SQ M
GLUCOSE SERPL-MCNC: 112 MG/DL (ref 65–140)
GLUCOSE SERPL-MCNC: 114 MG/DL (ref 65–140)
GLUCOSE SERPL-MCNC: 130 MG/DL (ref 65–140)
GLUCOSE SERPL-MCNC: 67 MG/DL (ref 65–140)
GLUCOSE SERPL-MCNC: 79 MG/DL (ref 65–140)
GLUCOSE SERPL-MCNC: 80 MG/DL (ref 65–140)
GLUCOSE SERPL-MCNC: 98 MG/DL (ref 65–140)
HCO3 BLDA-SCNC: 27.6 MMOL/L (ref 22–28)
HCO3 BLDA-SCNC: 27.8 MMOL/L (ref 22–28)
HCO3 BLDA-SCNC: 28.1 MMOL/L (ref 22–28)
HCO3 BLDA-SCNC: 28.2 MMOL/L (ref 22–28)
HCT VFR BLD AUTO: 22.3 % (ref 36.5–49.3)
HCT VFR BLD AUTO: 23.1 % (ref 36.5–49.3)
HFNC FLOW LPM: 23
HGB BLD-MCNC: 7 G/DL (ref 12–17)
HGB BLD-MCNC: 7.3 G/DL (ref 12–17)
HOROWITZ INDEX BLDA+IHG-RTO: 50 MM[HG]
HOROWITZ INDEX BLDA+IHG-RTO: 50 MM[HG]
HOROWITZ INDEX BLDA+IHG-RTO: 80 MM[HG]
HYPERCHROMIA BLD QL SMEAR: PRESENT
IRON SATN MFR SERPL: 9 %
IRON SERPL-MCNC: 19 UG/DL (ref 65–175)
LACTATE SERPL-SCNC: 1 MMOL/L (ref 0.5–2)
LYMPHOCYTES # BLD AUTO: 0.85 THOUSAND/UL (ref 0.6–4.47)
LYMPHOCYTES # BLD AUTO: 1.03 THOUSAND/UL (ref 0.6–4.47)
LYMPHOCYTES # BLD AUTO: 7 % (ref 14–44)
LYMPHOCYTES # BLD AUTO: 8 % (ref 14–44)
MACROCYTES BLD QL AUTO: PRESENT
MAGNESIUM SERPL-MCNC: 2.6 MG/DL (ref 1.6–2.6)
MCH RBC QN AUTO: 33.2 PG (ref 26.8–34.3)
MCH RBC QN AUTO: 33.7 PG (ref 26.8–34.3)
MCHC RBC AUTO-ENTMCNC: 31.4 G/DL (ref 31.4–37.4)
MCHC RBC AUTO-ENTMCNC: 31.6 G/DL (ref 31.4–37.4)
MCV RBC AUTO: 105 FL (ref 82–98)
MCV RBC AUTO: 107 FL (ref 82–98)
MONOCYTES # BLD AUTO: 1.03 THOUSAND/UL (ref 0–1.22)
MONOCYTES # BLD AUTO: 1.82 THOUSAND/UL (ref 0–1.22)
MONOCYTES NFR BLD: 15 % (ref 4–12)
MONOCYTES NFR BLD: 8 % (ref 4–12)
MYELOCYTES NFR BLD MANUAL: 2 % (ref 0–1)
NEUTROPHILS # BLD MANUAL: 10.28 THOUSAND/UL (ref 1.85–7.62)
NEUTROPHILS # BLD MANUAL: 9.45 THOUSAND/UL (ref 1.85–7.62)
NEUTS BAND NFR BLD MANUAL: 22 % (ref 0–8)
NEUTS BAND NFR BLD MANUAL: 23 % (ref 0–8)
NEUTS SEG NFR BLD AUTO: 55 % (ref 43–75)
NEUTS SEG NFR BLD AUTO: 58 % (ref 43–75)
NON VENT HFNC FIO2: 40
NON VENT TYPE HFNC: ABNORMAL
NRBC BLD AUTO-RTO: 0 /100 WBCS
NRBC BLD AUTO-RTO: 0 /100 WBCS
NT-PROBNP SERPL-MCNC: ABNORMAL PG/ML
O2 CT BLDA-SCNC: 10.1 ML/DL (ref 16–23)
O2 CT BLDA-SCNC: 10.5 ML/DL (ref 16–23)
O2 CT BLDA-SCNC: 9 ML/DL (ref 16–23)
O2 CT BLDA-SCNC: 9.7 ML/DL (ref 16–23)
OXYHGB MFR BLDA: 80.9 % (ref 94–97)
OXYHGB MFR BLDA: 91.3 % (ref 94–97)
OXYHGB MFR BLDA: 95.8 % (ref 94–97)
OXYHGB MFR BLDA: 97.5 % (ref 94–97)
PCO2 BLDA: 47.9 MM HG (ref 36–44)
PCO2 BLDA: 52.3 MM HG (ref 36–44)
PCO2 BLDA: 54.5 MM HG (ref 36–44)
PCO2 BLDA: 58.3 MM HG (ref 36–44)
PCO2 TEMP ADJ BLDA: 46.3 MM HG (ref 36–44)
PCO2 TEMP ADJ BLDA: 50.3 MM HG (ref 36–44)
PCO2 TEMP ADJ BLDA: 52.4 MM HG (ref 36–44)
PCO2 TEMP ADJ BLDA: 56 MM HG (ref 36–44)
PEEP RESPIRATORY: 5 CM[H2O]
PH BLD: 7.32 [PH] (ref 7.35–7.45)
PH BLD: 7.34 [PH] (ref 7.35–7.45)
PH BLD: 7.36 [PH] (ref 7.35–7.45)
PH BLD: 7.39 [PH] (ref 7.35–7.45)
PH BLDA: 7.3 [PH] (ref 7.35–7.45)
PH BLDA: 7.33 [PH] (ref 7.35–7.45)
PH BLDA: 7.35 [PH] (ref 7.35–7.45)
PH BLDA: 7.38 [PH] (ref 7.35–7.45)
PHOSPHATE SERPL-MCNC: 4.8 MG/DL (ref 2.7–4.5)
PLATELET # BLD AUTO: 88 THOUSANDS/UL (ref 149–390)
PLATELET # BLD AUTO: 99 THOUSANDS/UL (ref 149–390)
PLATELET BLD QL SMEAR: ABNORMAL
PLATELET BLD QL SMEAR: ABNORMAL
PMV BLD AUTO: 11.6 FL (ref 8.9–12.7)
PMV BLD AUTO: 11.9 FL (ref 8.9–12.7)
PO2 BLD: 146.8 MM HG (ref 75–129)
PO2 BLD: 42.8 MM HG (ref 75–129)
PO2 BLD: 64.8 MM HG (ref 75–129)
PO2 BLD: 86.8 MM HG (ref 75–129)
PO2 BLDA: 152 MM HG (ref 75–129)
PO2 BLDA: 45.6 MM HG (ref 75–129)
PO2 BLDA: 68.9 MM HG (ref 75–129)
PO2 BLDA: 91.3 MM HG (ref 75–129)
POTASSIUM SERPL-SCNC: 4.2 MMOL/L (ref 3.5–5.3)
POTASSIUM SERPL-SCNC: 4.5 MMOL/L (ref 3.5–5.3)
PROCALCITONIN SERPL-MCNC: 2.64 NG/ML
PROT SERPL-MCNC: 5.7 G/DL (ref 6.4–8.2)
RBC # BLD AUTO: 2.08 MILLION/UL (ref 3.88–5.62)
RBC # BLD AUTO: 2.2 MILLION/UL (ref 3.88–5.62)
RBC MORPH BLD: PRESENT
RBC MORPH BLD: PRESENT
SODIUM SERPL-SCNC: 140 MMOL/L (ref 136–145)
SODIUM SERPL-SCNC: 141 MMOL/L (ref 136–145)
SPECIMEN SOURCE: ABNORMAL
TIBC SERPL-MCNC: 205 UG/DL (ref 250–450)
TOTAL CELLS COUNTED SPEC: 100
TOTAL CELLS COUNTED SPEC: 100
TSH SERPL DL<=0.05 MIU/L-ACNC: 2.31 UIU/ML (ref 0.36–3.74)
VENT AC: 14
VENT AC: 18
VENT AC: 18
VENT- AC: AC
VT SETTING VENT: 375 ML
WBC # BLD AUTO: 12.12 THOUSAND/UL (ref 4.31–10.16)
WBC # BLD AUTO: 12.85 THOUSAND/UL (ref 4.31–10.16)

## 2018-10-05 PROCEDURE — 93306 TTE W/DOPPLER COMPLETE: CPT

## 2018-10-05 PROCEDURE — 85027 COMPLETE CBC AUTOMATED: CPT | Performed by: NURSE PRACTITIONER

## 2018-10-05 PROCEDURE — 82728 ASSAY OF FERRITIN: CPT | Performed by: PHYSICIAN ASSISTANT

## 2018-10-05 PROCEDURE — 31500 INSERT EMERGENCY AIRWAY: CPT | Performed by: PHYSICIAN ASSISTANT

## 2018-10-05 PROCEDURE — 06HM33Z INSERTION OF INFUSION DEVICE INTO RIGHT FEMORAL VEIN, PERCUTANEOUS APPROACH: ICD-10-PCS | Performed by: NURSE PRACTITIONER

## 2018-10-05 PROCEDURE — 94640 AIRWAY INHALATION TREATMENT: CPT

## 2018-10-05 PROCEDURE — 85007 BL SMEAR W/DIFF WBC COUNT: CPT | Performed by: NURSE PRACTITIONER

## 2018-10-05 PROCEDURE — 99291 CRITICAL CARE FIRST HOUR: CPT | Performed by: PHYSICIAN ASSISTANT

## 2018-10-05 PROCEDURE — 71045 X-RAY EXAM CHEST 1 VIEW: CPT

## 2018-10-05 PROCEDURE — 83880 ASSAY OF NATRIURETIC PEPTIDE: CPT | Performed by: PHYSICIAN ASSISTANT

## 2018-10-05 PROCEDURE — 83540 ASSAY OF IRON: CPT | Performed by: PHYSICIAN ASSISTANT

## 2018-10-05 PROCEDURE — 94003 VENT MGMT INPAT SUBQ DAY: CPT

## 2018-10-05 PROCEDURE — 5A1945Z RESPIRATORY VENTILATION, 24-96 CONSECUTIVE HOURS: ICD-10-PCS | Performed by: PHYSICIAN ASSISTANT

## 2018-10-05 PROCEDURE — 84145 PROCALCITONIN (PCT): CPT | Performed by: PHYSICIAN ASSISTANT

## 2018-10-05 PROCEDURE — 83735 ASSAY OF MAGNESIUM: CPT | Performed by: NURSE PRACTITIONER

## 2018-10-05 PROCEDURE — 93306 TTE W/DOPPLER COMPLETE: CPT | Performed by: INTERNAL MEDICINE

## 2018-10-05 PROCEDURE — 82948 REAGENT STRIP/BLOOD GLUCOSE: CPT

## 2018-10-05 PROCEDURE — 83550 IRON BINDING TEST: CPT | Performed by: PHYSICIAN ASSISTANT

## 2018-10-05 PROCEDURE — 05HN33Z INSERTION OF INFUSION DEVICE INTO LEFT INTERNAL JUGULAR VEIN, PERCUTANEOUS APPROACH: ICD-10-PCS | Performed by: NURSE PRACTITIONER

## 2018-10-05 PROCEDURE — 84443 ASSAY THYROID STIM HORMONE: CPT | Performed by: PHYSICIAN ASSISTANT

## 2018-10-05 PROCEDURE — 76770 US EXAM ABDO BACK WALL COMP: CPT

## 2018-10-05 PROCEDURE — 80053 COMPREHEN METABOLIC PANEL: CPT | Performed by: PHYSICIAN ASSISTANT

## 2018-10-05 PROCEDURE — 84100 ASSAY OF PHOSPHORUS: CPT | Performed by: NURSE PRACTITIONER

## 2018-10-05 PROCEDURE — 87070 CULTURE OTHR SPECIMN AEROBIC: CPT | Performed by: STUDENT IN AN ORGANIZED HEALTH CARE EDUCATION/TRAINING PROGRAM

## 2018-10-05 PROCEDURE — 87205 SMEAR GRAM STAIN: CPT | Performed by: STUDENT IN AN ORGANIZED HEALTH CARE EDUCATION/TRAINING PROGRAM

## 2018-10-05 PROCEDURE — 36556 INSERT NON-TUNNEL CV CATH: CPT | Performed by: PHYSICIAN ASSISTANT

## 2018-10-05 PROCEDURE — 36600 WITHDRAWAL OF ARTERIAL BLOOD: CPT

## 2018-10-05 PROCEDURE — 94002 VENT MGMT INPAT INIT DAY: CPT

## 2018-10-05 PROCEDURE — 0BH17EZ INSERTION OF ENDOTRACHEAL AIRWAY INTO TRACHEA, VIA NATURAL OR ARTIFICIAL OPENING: ICD-10-PCS | Performed by: PHYSICIAN ASSISTANT

## 2018-10-05 PROCEDURE — 94760 N-INVAS EAR/PLS OXIMETRY 1: CPT

## 2018-10-05 PROCEDURE — 94660 CPAP INITIATION&MGMT: CPT

## 2018-10-05 PROCEDURE — 36620 INSERTION CATHETER ARTERY: CPT | Performed by: PHYSICIAN ASSISTANT

## 2018-10-05 PROCEDURE — 82805 BLOOD GASES W/O2 SATURATION: CPT | Performed by: PHYSICIAN ASSISTANT

## 2018-10-05 PROCEDURE — 80048 BASIC METABOLIC PNL TOTAL CA: CPT | Performed by: NURSE PRACTITIONER

## 2018-10-05 PROCEDURE — 82805 BLOOD GASES W/O2 SATURATION: CPT | Performed by: NURSE PRACTITIONER

## 2018-10-05 PROCEDURE — 99233 SBSQ HOSP IP/OBS HIGH 50: CPT | Performed by: INTERNAL MEDICINE

## 2018-10-05 PROCEDURE — 83605 ASSAY OF LACTIC ACID: CPT | Performed by: NURSE PRACTITIONER

## 2018-10-05 PROCEDURE — 36620 INSERTION CATHETER ARTERY: CPT | Performed by: NURSE PRACTITIONER

## 2018-10-05 RX ORDER — ACETYLCYSTEINE 200 MG/ML
3 SOLUTION ORAL; RESPIRATORY (INHALATION)
Status: DISCONTINUED | OUTPATIENT
Start: 2018-10-05 | End: 2018-10-05

## 2018-10-05 RX ORDER — CHLORHEXIDINE GLUCONATE 0.12 MG/ML
15 RINSE ORAL EVERY 12 HOURS SCHEDULED
Status: DISCONTINUED | OUTPATIENT
Start: 2018-10-05 | End: 2018-10-09

## 2018-10-05 RX ORDER — MIDAZOLAM HYDROCHLORIDE 1 MG/ML
5 INJECTION INTRAMUSCULAR; INTRAVENOUS ONCE
Status: COMPLETED | OUTPATIENT
Start: 2018-10-05 | End: 2018-10-05

## 2018-10-05 RX ORDER — SUCCINYLCHOLINE/SOD CL,ISO/PF 100 MG/5ML
60 SYRINGE (ML) INTRAVENOUS ONCE
Status: COMPLETED | OUTPATIENT
Start: 2018-10-05 | End: 2018-10-05

## 2018-10-05 RX ORDER — FUROSEMIDE 10 MG/ML
40 INJECTION INTRAMUSCULAR; INTRAVENOUS ONCE
Status: COMPLETED | OUTPATIENT
Start: 2018-10-05 | End: 2018-10-05

## 2018-10-05 RX ORDER — FENTANYL CITRATE 50 UG/ML
25 INJECTION, SOLUTION INTRAMUSCULAR; INTRAVENOUS EVERY 2 HOUR PRN
Status: DISCONTINUED | OUTPATIENT
Start: 2018-10-05 | End: 2018-10-09

## 2018-10-05 RX ORDER — LIDOCAINE HYDROCHLORIDE 10 MG/ML
10 INJECTION, SOLUTION INFILTRATION; PERINEURAL ONCE
Status: DISCONTINUED | OUTPATIENT
Start: 2018-10-05 | End: 2018-10-05 | Stop reason: SDUPTHER

## 2018-10-05 RX ORDER — MULTIVIT WITH MINERALS/LUTEIN
500 TABLET ORAL DAILY
Status: DISCONTINUED | OUTPATIENT
Start: 2018-10-06 | End: 2018-10-06

## 2018-10-05 RX ORDER — ACETYLCYSTEINE 200 MG/ML
3 SOLUTION ORAL; RESPIRATORY (INHALATION)
Status: DISCONTINUED | OUTPATIENT
Start: 2018-10-05 | End: 2018-10-08

## 2018-10-05 RX ORDER — FENTANYL CITRATE/PF 50 MCG/ML
50 SYRINGE (ML) INJECTION EVERY 2 HOUR PRN
Status: DISCONTINUED | OUTPATIENT
Start: 2018-10-05 | End: 2018-10-08

## 2018-10-05 RX ORDER — ALBUMIN, HUMAN INJ 5% 5 %
12.5 SOLUTION INTRAVENOUS ONCE
Status: COMPLETED | OUTPATIENT
Start: 2018-10-05 | End: 2018-10-05

## 2018-10-05 RX ORDER — NOREPINEPHRINE BITARTRATE 1 MG/ML
INJECTION, SOLUTION INTRAVENOUS
Status: DISPENSED
Start: 2018-10-05 | End: 2018-10-06

## 2018-10-05 RX ORDER — MIDAZOLAM HYDROCHLORIDE 2 MG/ML
4 SYRUP ORAL ONCE
Status: DISCONTINUED | OUTPATIENT
Start: 2018-10-05 | End: 2018-10-05

## 2018-10-05 RX ORDER — MIDAZOLAM HYDROCHLORIDE 1 MG/ML
INJECTION INTRAMUSCULAR; INTRAVENOUS
Status: COMPLETED
Start: 2018-10-05 | End: 2018-10-05

## 2018-10-05 RX ORDER — FAMOTIDINE 40 MG/5ML
20 POWDER, FOR SUSPENSION ORAL DAILY
Status: DISCONTINUED | OUTPATIENT
Start: 2018-10-05 | End: 2018-10-09

## 2018-10-05 RX ORDER — FUROSEMIDE 10 MG/ML
80 INJECTION INTRAMUSCULAR; INTRAVENOUS ONCE
Status: COMPLETED | OUTPATIENT
Start: 2018-10-05 | End: 2018-10-05

## 2018-10-05 RX ORDER — LIDOCAINE HYDROCHLORIDE 10 MG/ML
5 INJECTION, SOLUTION EPIDURAL; INFILTRATION; INTRACAUDAL; PERINEURAL ONCE
Status: COMPLETED | OUTPATIENT
Start: 2018-10-05 | End: 2018-10-05

## 2018-10-05 RX ORDER — VANCOMYCIN HYDROCHLORIDE 1 G/200ML
15 INJECTION, SOLUTION INTRAVENOUS EVERY 24 HOURS
Status: DISCONTINUED | OUTPATIENT
Start: 2018-10-05 | End: 2018-10-07

## 2018-10-05 RX ADMIN — QUETIAPINE 50 MG: 25 TABLET ORAL at 23:28

## 2018-10-05 RX ADMIN — FENTANYL CITRATE 50 MCG: 50 INJECTION, SOLUTION INTRAMUSCULAR; INTRAVENOUS at 11:54

## 2018-10-05 RX ADMIN — LACTOBACILLUS ACIDOPHILUS / LACTOBACILLUS BULGARICUS 1 PACKET: 100 MILLION CFU STRENGTH GRANULES at 18:16

## 2018-10-05 RX ADMIN — NOREPINEPHRINE BITARTRATE 5 MCG/MIN: 1 INJECTION INTRAVENOUS at 23:46

## 2018-10-05 RX ADMIN — ALBUMIN HUMAN 12.5 G: 0.05 INJECTION, SOLUTION INTRAVENOUS at 04:34

## 2018-10-05 RX ADMIN — MIDAZOLAM HYDROCHLORIDE 5 MG: 1 INJECTION INTRAMUSCULAR; INTRAVENOUS at 11:28

## 2018-10-05 RX ADMIN — SODIUM CHLORIDE 250 ML: 0.9 INJECTION, SOLUTION INTRAVENOUS at 02:49

## 2018-10-05 RX ADMIN — HYDROCORTISONE SODIUM SUCCINATE 50 MG: 100 INJECTION, POWDER, FOR SOLUTION INTRAMUSCULAR; INTRAVENOUS at 18:40

## 2018-10-05 RX ADMIN — LACOSAMIDE 100 MG: 50 TABLET, FILM COATED ORAL at 08:16

## 2018-10-05 RX ADMIN — HEPARIN SODIUM 5000 UNITS: 5000 INJECTION, SOLUTION INTRAVENOUS; SUBCUTANEOUS at 05:23

## 2018-10-05 RX ADMIN — MUPIROCIN 1 APPLICATION: 20 OINTMENT TOPICAL at 21:00

## 2018-10-05 RX ADMIN — NOREPINEPHRINE BITARTRATE 5 MCG/MIN: 1 INJECTION INTRAVENOUS at 12:31

## 2018-10-05 RX ADMIN — DEXMEDETOMIDINE HYDROCHLORIDE 0.5 MCG/KG/HR: 100 INJECTION, SOLUTION INTRAVENOUS at 22:30

## 2018-10-05 RX ADMIN — LACTOBACILLUS ACIDOPHILUS / LACTOBACILLUS BULGARICUS 1 PACKET: 100 MILLION CFU STRENGTH GRANULES at 08:16

## 2018-10-05 RX ADMIN — LACOSAMIDE 100 MG: 50 TABLET, FILM COATED ORAL at 21:00

## 2018-10-05 RX ADMIN — CHLORHEXIDINE GLUCONATE 0.12% ORAL RINSE 15 ML: 1.2 LIQUID ORAL at 21:00

## 2018-10-05 RX ADMIN — FUROSEMIDE 40 MG: 10 INJECTION, SOLUTION INTRAMUSCULAR; INTRAVENOUS at 03:53

## 2018-10-05 RX ADMIN — POLYETHYLENE GLYCOL 3350 17 G: 17 POWDER, FOR SOLUTION ORAL at 08:16

## 2018-10-05 RX ADMIN — ALBUMIN HUMAN 12.5 G: 0.05 INJECTION, SOLUTION INTRAVENOUS at 03:12

## 2018-10-05 RX ADMIN — CEFEPIME HYDROCHLORIDE 1000 MG: 1 INJECTION, SOLUTION INTRAVENOUS at 14:58

## 2018-10-05 RX ADMIN — ALBUMIN HUMAN 12.5 G: 0.05 INJECTION, SOLUTION INTRAVENOUS at 02:55

## 2018-10-05 RX ADMIN — METRONIDAZOLE 500 MG: 500 INJECTION, SOLUTION INTRAVENOUS at 06:38

## 2018-10-05 RX ADMIN — QUETIAPINE 25 MG: 25 TABLET ORAL at 08:16

## 2018-10-05 RX ADMIN — FENTANYL CITRATE 50 MCG: 50 INJECTION, SOLUTION INTRAMUSCULAR; INTRAVENOUS at 16:44

## 2018-10-05 RX ADMIN — ACETYLCYSTEINE 3 ML: 200 SOLUTION ORAL; RESPIRATORY (INHALATION) at 07:23

## 2018-10-05 RX ADMIN — CHLORHEXIDINE GLUCONATE 0.12% ORAL RINSE 15 ML: 1.2 LIQUID ORAL at 13:45

## 2018-10-05 RX ADMIN — Medication 60 MG: at 11:29

## 2018-10-05 RX ADMIN — DEXMEDETOMIDINE HYDROCHLORIDE 0.5 MCG/KG/HR: 100 INJECTION, SOLUTION INTRAVENOUS at 17:26

## 2018-10-05 RX ADMIN — IPRATROPIUM BROMIDE AND ALBUTEROL SULFATE 3 ML: .5; 3 SOLUTION RESPIRATORY (INHALATION) at 02:10

## 2018-10-05 RX ADMIN — VASOPRESSIN 0.04 UNITS/MIN: 20 INJECTION INTRAVENOUS at 22:30

## 2018-10-05 RX ADMIN — FENTANYL CITRATE 50 MCG: 50 INJECTION, SOLUTION INTRAMUSCULAR; INTRAVENOUS at 11:28

## 2018-10-05 RX ADMIN — IPRATROPIUM BROMIDE AND ALBUTEROL SULFATE 3 ML: .5; 3 SOLUTION RESPIRATORY (INHALATION) at 20:12

## 2018-10-05 RX ADMIN — FAMOTIDINE 20 MG: 40 POWDER, FOR SUSPENSION ORAL at 09:11

## 2018-10-05 RX ADMIN — HEPARIN SODIUM 5000 UNITS: 5000 INJECTION, SOLUTION INTRAVENOUS; SUBCUTANEOUS at 21:59

## 2018-10-05 RX ADMIN — METRONIDAZOLE 500 MG: 500 INJECTION, SOLUTION INTRAVENOUS at 21:59

## 2018-10-05 RX ADMIN — IPRATROPIUM BROMIDE AND ALBUTEROL SULFATE 3 ML: .5; 3 SOLUTION RESPIRATORY (INHALATION) at 14:34

## 2018-10-05 RX ADMIN — FUROSEMIDE 80 MG: 10 INJECTION, SOLUTION INTRAMUSCULAR; INTRAVENOUS at 09:11

## 2018-10-05 RX ADMIN — ACETYLCYSTEINE 600 MG: 200 SOLUTION ORAL; RESPIRATORY (INHALATION) at 20:12

## 2018-10-05 RX ADMIN — MIDAZOLAM 5 MG: 1 INJECTION INTRAMUSCULAR; INTRAVENOUS at 11:28

## 2018-10-05 RX ADMIN — MUPIROCIN 1 APPLICATION: 20 OINTMENT TOPICAL at 09:11

## 2018-10-05 RX ADMIN — VANCOMYCIN HYDROCHLORIDE 1000 MG: 1 INJECTION, SOLUTION INTRAVENOUS at 18:04

## 2018-10-05 RX ADMIN — IPRATROPIUM BROMIDE AND ALBUTEROL SULFATE 3 ML: .5; 3 SOLUTION RESPIRATORY (INHALATION) at 07:23

## 2018-10-05 RX ADMIN — VASOPRESSIN 0.04 UNITS/MIN: 20 INJECTION INTRAVENOUS at 18:44

## 2018-10-05 RX ADMIN — HEPARIN SODIUM 5000 UNITS: 5000 INJECTION, SOLUTION INTRAVENOUS; SUBCUTANEOUS at 13:45

## 2018-10-05 RX ADMIN — METRONIDAZOLE 500 MG: 500 INJECTION, SOLUTION INTRAVENOUS at 13:46

## 2018-10-05 RX ADMIN — LIDOCAINE HYDROCHLORIDE 5 ML: 10 INJECTION, SOLUTION EPIDURAL; INFILTRATION; INTRACAUDAL; PERINEURAL at 16:56

## 2018-10-05 RX ADMIN — FENTANYL CITRATE 50 MCG: 50 INJECTION, SOLUTION INTRAMUSCULAR; INTRAVENOUS at 19:38

## 2018-10-05 NOTE — PROGRESS NOTES
Called to roomy by RN - patient tachypneic/hypoxic with mild respiratory distress, hypotensive  Patient was given nebulizers and suctioned just prior to call  See RRT note

## 2018-10-05 NOTE — ANESTHESIA PREPROCEDURE EVALUATION
Review of Systems/Medical History  Patient summary reviewed  Chart reviewed      Cardiovascular   Pulmonary  Pneumonia,        GI/Hepatic    GERD ,             Endo/Other     GYN       Hematology   Musculoskeletal       Neurology  Seizures ,     Psychology                Anesthesia Plan  ASA Score- 3 Emergent    Anesthesia Type- IV sedation with anesthesia with ASA Monitors  Additional Monitors:   Airway Plan:         Plan Factors-    Induction- intravenous  Postoperative Plan-     Informed Consent- Anesthetic plan and risks discussed with patient  I personally reviewed this patient with the CRNA  Discussed and agreed on the Anesthesia Plan with the CRNA  Ting Traylor

## 2018-10-05 NOTE — SOCIAL WORK
Pt is on MBP now, letter provided at the bedside  Pt is from [facility]

## 2018-10-05 NOTE — PROCEDURES
Intubation  Date/Time: 10/5/2018 12:22 PM  Performed by: Damian Morales by: Jordin Srivastava     Patient location:  Bedside  Other Assisting Provider: No    Consent:     Consent obtained:  Emergent situation  Universal protocol:     Patient identity confirmed: Anonymous protocol, patient vented/unresponsive  Pre-procedure details:     Patient status:  Unresponsive    Mallampati score:  3    Pretreatment medications:  Fentanyl and midazolam    Paralytics:  Succinylcholine  Indications:     Indications for intubation: respiratory failure, airway protection, hypoxemia and hypercapnia    Procedure details:     Preoxygenation:  Bag valve mask    CPR in progress: no      Intubation method:  Oral    Oral intubation technique: Sun  Laryngoscope blade: Mac 4    Tube size (mm):  8 0    Number of attempts:  1    Cricoid pressure: no      Tube visualized through cords: yes    Placement assessment:     ETT to lip:  22    Tube secured with:  ETT thao    Breath sounds:  Absent over the epigastrium and equal    Placement verification: chest rise, direct visualization, ETCO2 detector and tube exhalation    Post-procedure details:     Patient tolerance of procedure:   Tolerated well, no immediate complications

## 2018-10-05 NOTE — RAPID RESPONSE
Progress Note - Rapid Response   Kvng Prim 52 y o  male MRN: [id number]    Time Called ( Time): uncalled rapid - contacted by hospitalist NP around 0300 regarding patient with tachypnea, hypoxia, and hypotension  Date Called: 10/5/18  Level of Care: MS  Room#: 297  [id number] Time ( Time): 0300  Event End Time ( Time):   MEWS score at time of Rapid Response:   Primary reason for call: Acute change in SBP and Acute change in SPO2  Interventions:  Airway/Breathing:  O2 Mask/Nasal  Circulation: IV Fluid Bolus  Other Treatments: N/A       Assessment/Plan:     Acute respiratory failure with hypoxia ([facility] [address] )   Assessment & Plan    · CXR on admission with right pneumonia with saritha pneumonic effusion  Concern for left sided infiltrate as well  · Repeat CXR with AM with persistent opacities as well as concern for CHF  · Received lasix 40mg IV as well as albumin  · Concern for inability to handle secretions, will add flagyl for the time being  · Currently on 4L NC     * Septic shock ([facility] [address] )   Assessment & Plan    · Admitted 10/2 with sepsis secondary to UTI and pneumonia  · Improved and transferred to the floor on 10/4  · Early morning 10/5 - called for hypoxia and hypotension  · Repeat Lactic acid 1 0, repeat WBC downtrending  Remains afebrile  · Received 250ml NSS and 5% albumin 12 5gms x 2  · cardiac component to hypotension? No echo to review  Will attempt bedside POCUS, formal echo to follow  · CXR on admission:   · Diffuse pneumonia in the right lung  Suspected right-sided pleural effusion    · UA on admission:   · + Leukocytes, Large Blood, Innumerable WBC/Bacteria  · Procalcitonin on admission: 10 72  · BCX x2: Proteus  · Influenza A/B: Negative  · Sputum Cx and GS: Pending  · UCx: E coli  · Continue Day#3: Cefepime       Pneumonia of right upper lobe due to infectious organism [facility])   Assessment & Plan    · Right sided pneumonia with left sided infiltrate blossoming as well  · Procalcitonin: 10 72  · Influenza A/B: Negative  · BCx x2: proteus  · Sputum CX/GS Pending  · Continue Day#3: Cefepime       Acute kidney injury ([facility] )   Assessment & Plan    · Baseline Cr: 0 9-1  · Cr on admission: 2 79  · Current Cr: 2 74  · Will monitor with daily CMP  · Will Consult Nephrology     Urinary tract infection associated with catheterization of urinary tract (HCC)   Assessment & Plan    · UA on admission:   · + Leukocytes, Large Blood, Innumerable WBC/Bacteria  · UCx with E coli  ? Colonizer given suprapubic catheter  · Hx of Suprapubic Catheter   · Continue ABx therapy As Above     Leukocytosis   Assessment & Plan    · WBC on admission: 20 74  · Current WBC: 13 51  · Will monitor with daily CBC     Pressure injury of right buttock, unstageable (HCC)   Assessment & Plan    · POA  · Continue Daily Wound Care  · Frequent Turning and Offloading     Seizure disorder ([facility] )   Assessment & Plan    · Continue home medications:   · Seroquel 25mg qd morning and 50mg qd bedtime     Down syndrome   Assessment & Plan    · At Baseline     Impulse control disorder   Assessment & Plan    · At Baseline  · Agitation Control prn              HPI/Chief Complaint (Background/Situation):   Mirian Reed is a 52y o  year old male who presented on 10/02 with lethargy and weakness  He was admitted to the ICU for hypotension however did not require any vasopressors  He was being treated for sepsis secondary to urinary tract infection as well as a pneumonia  He improved and was transferred to the medical floor earlier on 10/04  Called this morning on 10/05 for hypoxia, work of breathing, and hypotension  Blood pressure transiently improved with fluids and albumin however trended back down  Chest x-ray concerning for increasing vascular congestion  Patient was given 40 mg of Lasix  Given his ongoing hypotension, will transfer to the step-down unit for further monitoring      Historical Information   Past Medical History:   Diagnosis Date  Constipation     Down syndrome     Dysphagia     has a peg tube    Fall     Folliculitis     Fracture of left forearm     Gastritis     GERD (gastroesophageal reflux disease)     Impulse control disorder     Jaw fracture (HCC)     s/p right mandibular fracture  s/p tracheostomy    Left leg DVT (HCC)     Pericardial effusion     Pericardial effusion     had a pericardial window 2015    Pleural effusion, bilateral     Pneumonia     Seizure ([facility] [address] )     Seizure ([facility] [address] )     new onset 2013     Past Surgical History:   Procedure Laterality Date    DENTAL REHABILITATION      EXPLORATORY LAPAROTOMY      for abdominal pain    PEG TUBE PLACEMENT      TRACHEOSTOMY      temporary after fall and jaw fracture 2015-weaned off vent and trach removed     Social History   History   Alcohol Use No     History   Drug Use No     History   Smoking Status    Never Smoker   Smokeless Tobacco    Never Used     Family History: non-contributory    Meds/Allergies     Current Facility-Administered Medications:  acetylcysteine 3 mL Nebulization Q8H REMEDIOS Mccoy    bisacodyl 10 mg Rectal Daily PRN Robert Herzog MD    cefepime 1,000 mg Intravenous Q24H Robert Herzog MD Last Rate: 1,000 mg (10/04/18 1302)   famotidine 40 mg Per G Tube BID Robert Herzog MD    heparin (porcine) 5,000 Units Subcutaneous Q8H Monet Green MD    ipratropium-albuterol 3 mL Nebulization Cory Martinez MD    lacosamide 100 mg Per G Tube Q12H Monet Green MD    lactobacillus acidophilus-bulgaricus 1 packet Oral BID Robert Herzog MD    melatonin 6 mg Oral HS Neetu Melvin PA-C    mupirocin  Nasal Q12H Monet Green MD    polyethylene glycol 17 g Per G Tube Daily Robert Herzog MD    QUEtiapine 25 mg Per Jenny Hoff MD    QUEtiapine 50 mg Per Gonsalo Pulido MD    sodium chloride 250 mL Intravenous Once REMEDIOS Oneil Last Rate: 250 mL (10/05/18 0249)            No Known Allergies    ROS: Unable to obtain secondary to nonverbal    Vitals: Blood pressure 92/56, pulse 80, temperature 97 8 °F (36 6 °C), temperature source Temporal, resp  rate 18, height 5' 6" (1 676 m), weight 61 kg (134 lb 7 7 oz), SpO2 93 %  Physical Exam:  Physical Exam   Constitutional: He appears distressed  HENT:   Head: Atraumatic  Secretions in oropharynx   Eyes: Pupils are equal, round, and reactive to light  Left eye exhibits no discharge  No scleral icterus  Neck: Normal range of motion  Neck supple  No JVD present  Cardiovascular: Normal rate and regular rhythm  No murmur heard  Pulmonary/Chest: He is in respiratory distress  He has wheezes  Abdominal: Soft  Bowel sounds are normal  He exhibits no distension  Musculoskeletal: He exhibits no edema  Neurological:   Lethargic but responsive to verbal stimuli, moving all extremities   Skin: Skin is warm and dry  He is not diaphoretic  No erythema  Nursing note and vitals reviewed  Intake/Output Summary (Last 24 hours) at 10/05/18 0408  Last data filed at 10/04/18 2224   Gross per 24 hour   Intake           918 75 ml   Output              660 ml   Net           258 75 ml       Respiratory    Lab Data (Last 4 hours)    None         O2/Vent Data (Last 4 hours)    None              Invasive Devices     Peripheral Intravenous Line            Peripheral IV 10/02/18 Left Antecubital 2 days    Long-Dwell Peripheral IV (Midline) 43/27/46 Right Basilic 1 day          Drain            Gastrostomy/Enterostomy Percutaneous endoscopic gastrostomy (PEG) LLQ -- days    Suprapubic Catheter 18 Fr  -- days    Urethral Catheter 18 Fr  -- days                DIAGNOSTIC DATA:    Lab: I have personally reviewed pertinent lab results     CBC:     Results from last 7 days  Lab Units 10/05/18  0247   WBC Thousand/uL 12 85*   HEMOGLOBIN g/dL 7 0*   HEMATOCRIT % 22 3*   PLATELETS Thousands/uL 99*     CMP:     Results from last 7 days  Lab Units 10/05/18  0247 10/04/18  8017 10/03/18  0600 10/02/18  1156   SODIUM mmol/L 140 137 137 135*   POTASSIUM mmol/L 4 5 4 7 5 3 4 9   CHLORIDE mmol/L 106 104 104 100   CO2 mmol/L 28 26 26 31   BUN mg/dL 61* 60* 63* 66*   CREATININE mg/dL 2 94* 2 74* 2 69* 2 79*   CALCIUM mg/dL 8 0* 8 3 8 5 9 0   ALK PHOS U/L  --  150*  --  138*   ALT U/L  --  28  --  33   AST U/L  --  45  --  74*     PT/INR:   No results found for: PT, INR,   Magnesium: No results found for: MAG,   Phosphorous:   Lab Results   Component Value Date    PHOS 4 8 (H) 10/05/2018       Microbiology:  Lab Results   Component Value Date    BLOODCX Proteus mirabilis (A) 10/02/2018    BLOODCX Proteus mirabilis (A) 10/02/2018    URINECX >100,000 cfu/ml Escherichia coli (A) 10/02/2018    URINECX >100,000 cfu/ml Mixed Contaminants X6 09/16/2017         OUTCOME:   Transfer to step down unit  Family notified of transfer: no  Family member contacted:   Code Status: Level 1 - Full Code  Critical Care Time: Total Critical Care time spent 41 minutes excluding procedures, teaching and family updates

## 2018-10-05 NOTE — PLAN OF CARE
Problem: SKIN/TISSUE INTEGRITY - ADULT  Goal: Skin integrity remains intact  INTERVENTIONS  - Identify patients at risk for skin breakdown  - Assess and monitor skin integrity  - Assess and monitor nutrition and hydration status  - Monitor labs (i e  albumin)  - Assess for incontinence   - Turn and reposition patient  - Assist with mobility/ambulation  - Relieve pressure over bony prominences  - Avoid friction and shearing  - Provide appropriate hygiene as needed including keeping skin clean and dry  - Evaluate need for skin moisturizer/barrier cream  - Collaborate with interdisciplinary team (i e  Nutrition, Rehabilitation, etc )   - Patient/family teaching   Outcome: Progressing

## 2018-10-05 NOTE — PLAN OF CARE
CARDIOVASCULAR - ADULT     Maintains optimal cardiac output and hemodynamic stability Progressing        DISCHARGE PLANNING     Discharge to home or other facility with appropriate resources Progressing        DISCHARGE PLANNING - CARE MANAGEMENT     Discharge to post-acute care or home with appropriate resources Progressing        INFECTION - ADULT     Absence or prevention of progression during hospitalization Progressing     Absence of fever/infection during neutropenic period Progressing        Knowledge Deficit     Patient/family/caregiver demonstrates understanding of disease process, treatment plan, medications, and discharge instructions Progressing        METABOLIC, FLUID AND ELECTROLYTES - ADULT     Electrolytes maintained within normal limits Progressing     Fluid balance maintained Progressing        Nutrition/Hydration-ADULT     Nutrient/Hydration intake appropriate for improving, restoring or maintaining nutritional needs Progressing        PAIN - ADULT     Verbalizes/displays adequate comfort level or baseline comfort level Progressing        Potential for Falls     Patient will remain free of falls Progressing        Prexisting or High Potential for Compromised Skin Integrity     Skin integrity is maintained or improved Progressing        RESPIRATORY - ADULT     Achieves optimal ventilation and oxygenation Progressing        SAFETY ADULT     Maintain or return to baseline ADL function Progressing     Maintain or return mobility status to optimal level Progressing        SAFETY,RESTRAINT: NV/NON-SELF DESTRUCTIVE BEHAVIOR     Remains free of harm/injury (restraint for non violent/non self-detsructive behavior) Progressing     Returns to optimal restraint-free functioning Progressing        SKIN/TISSUE INTEGRITY - ADULT     Skin integrity remains intact Progressing     Incision(s), wounds(s) or drain site(s) healing without S/S of infection Progressing     Oral mucous membranes remain intact Progressing

## 2018-10-06 PROBLEM — D72.829 LEUKOCYTOSIS: Status: RESOLVED | Noted: 2018-10-02 | Resolved: 2018-10-06

## 2018-10-06 LAB
ABO GROUP BLD: NORMAL
ANION GAP SERPL CALCULATED.3IONS-SCNC: 13 MMOL/L (ref 4–13)
ANISOCYTOSIS BLD QL SMEAR: PRESENT
ARTERIAL PATENCY WRIST A: YES
BASE EXCESS BLDA CALC-SCNC: 0.1 MMOL/L
BASOPHILS # BLD MANUAL: 0.1 THOUSAND/UL (ref 0–0.1)
BASOPHILS NFR MAR MANUAL: 1 % (ref 0–1)
BLD GP AB SCN SERPL QL: NEGATIVE
BODY TEMPERATURE: 96.8 DEGREES FEHRENHEIT
BUN SERPL-MCNC: 65 MG/DL (ref 5–25)
CA-I BLD-SCNC: 1.1 MMOL/L (ref 1.12–1.32)
CALCIUM SERPL-MCNC: 8.3 MG/DL (ref 8.3–10.1)
CHLORIDE SERPL-SCNC: 104 MMOL/L (ref 100–108)
CO2 SERPL-SCNC: 24 MMOL/L (ref 21–32)
CREAT SERPL-MCNC: 3.37 MG/DL (ref 0.6–1.3)
DOHLE BOD BLD QL SMEAR: PRESENT
EOSINOPHIL # BLD MANUAL: 0 THOUSAND/UL (ref 0–0.4)
EOSINOPHIL NFR BLD MANUAL: 0 % (ref 0–6)
ERYTHROCYTE [DISTWIDTH] IN BLOOD BY AUTOMATED COUNT: 17.2 % (ref 11.6–15.1)
GFR SERPL CREATININE-BSD FRML MDRD: 19 ML/MIN/1.73SQ M
GLUCOSE SERPL-MCNC: 122 MG/DL (ref 65–140)
GLUCOSE SERPL-MCNC: 134 MG/DL (ref 65–140)
GLUCOSE SERPL-MCNC: 179 MG/DL (ref 65–140)
GLUCOSE SERPL-MCNC: 235 MG/DL (ref 65–140)
HCO3 BLDA-SCNC: 25.5 MMOL/L (ref 22–28)
HCT VFR BLD AUTO: 21.3 % (ref 36.5–49.3)
HCT VFR BLD AUTO: 23.9 % (ref 36.5–49.3)
HGB BLD-MCNC: 6.9 G/DL (ref 12–17)
HGB BLD-MCNC: 7.7 G/DL (ref 12–17)
HOROWITZ INDEX BLDA+IHG-RTO: 40 MM[HG]
HYPERCHROMIA BLD QL SMEAR: PRESENT
LYMPHOCYTES # BLD AUTO: 0.6 THOUSAND/UL (ref 0.6–4.47)
LYMPHOCYTES # BLD AUTO: 6 % (ref 14–44)
MACROCYTES BLD QL AUTO: PRESENT
MAGNESIUM SERPL-MCNC: 2.3 MG/DL (ref 1.6–2.6)
MCH RBC QN AUTO: 33.7 PG (ref 26.8–34.3)
MCHC RBC AUTO-ENTMCNC: 32.4 G/DL (ref 31.4–37.4)
MCV RBC AUTO: 104 FL (ref 82–98)
METAMYELOCYTES NFR BLD MANUAL: 3 % (ref 0–1)
MONOCYTES # BLD AUTO: 0.1 THOUSAND/UL (ref 0–1.22)
MONOCYTES NFR BLD: 1 % (ref 4–12)
MYELOCYTES NFR BLD MANUAL: 1 % (ref 0–1)
NEUTROPHILS # BLD MANUAL: 8.77 THOUSAND/UL (ref 1.85–7.62)
NEUTS BAND NFR BLD MANUAL: 20 % (ref 0–8)
NEUTS SEG NFR BLD AUTO: 68 % (ref 43–75)
NRBC BLD AUTO-RTO: 0 /100 WBCS
O2 CT BLDA-SCNC: 10.2 ML/DL (ref 16–23)
OXYHGB MFR BLDA: 94.1 % (ref 94–97)
PCO2 BLDA: 45 MM HG (ref 36–44)
PCO2 TEMP ADJ BLDA: 43.1 MM HG (ref 36–44)
PEEP RESPIRATORY: 5 CM[H2O]
PH BLD: 7.39 [PH] (ref 7.35–7.45)
PH BLDA: 7.37 [PH] (ref 7.35–7.45)
PHOSPHATE SERPL-MCNC: 5.9 MG/DL (ref 2.7–4.5)
PLATELET # BLD AUTO: 81 THOUSANDS/UL (ref 149–390)
PLATELET BLD QL SMEAR: ABNORMAL
PMV BLD AUTO: 12.6 FL (ref 8.9–12.7)
PO2 BLD: 72.9 MM HG (ref 75–129)
PO2 BLDA: 77.9 MM HG (ref 75–129)
POTASSIUM SERPL-SCNC: 4.9 MMOL/L (ref 3.5–5.3)
PROCALCITONIN SERPL-MCNC: 2.52 NG/ML
RBC # BLD AUTO: 2.05 MILLION/UL (ref 3.88–5.62)
RBC MORPH BLD: PRESENT
RH BLD: POSITIVE
SODIUM SERPL-SCNC: 141 MMOL/L (ref 136–145)
SPECIMEN EXPIRATION DATE: NORMAL
TOTAL CELLS COUNTED SPEC: 100
TOXIC GRANULES BLD QL SMEAR: PRESENT
VENT AC: 18
VENT- AC: AC
VT SETTING VENT: 400 ML
WBC # BLD AUTO: 9.97 THOUSAND/UL (ref 4.31–10.16)

## 2018-10-06 PROCEDURE — 84100 ASSAY OF PHOSPHORUS: CPT | Performed by: NURSE PRACTITIONER

## 2018-10-06 PROCEDURE — 94640 AIRWAY INHALATION TREATMENT: CPT

## 2018-10-06 PROCEDURE — 30233N1 TRANSFUSION OF NONAUTOLOGOUS RED BLOOD CELLS INTO PERIPHERAL VEIN, PERCUTANEOUS APPROACH: ICD-10-PCS | Performed by: PHYSICIAN ASSISTANT

## 2018-10-06 PROCEDURE — 82330 ASSAY OF CALCIUM: CPT | Performed by: NURSE PRACTITIONER

## 2018-10-06 PROCEDURE — 86900 BLOOD TYPING SEROLOGIC ABO: CPT | Performed by: PHYSICIAN ASSISTANT

## 2018-10-06 PROCEDURE — 82948 REAGENT STRIP/BLOOD GLUCOSE: CPT

## 2018-10-06 PROCEDURE — 82805 BLOOD GASES W/O2 SATURATION: CPT | Performed by: NURSE PRACTITIONER

## 2018-10-06 PROCEDURE — 85027 COMPLETE CBC AUTOMATED: CPT | Performed by: PHYSICIAN ASSISTANT

## 2018-10-06 PROCEDURE — 83735 ASSAY OF MAGNESIUM: CPT | Performed by: NURSE PRACTITIONER

## 2018-10-06 PROCEDURE — 86850 RBC ANTIBODY SCREEN: CPT | Performed by: PHYSICIAN ASSISTANT

## 2018-10-06 PROCEDURE — 03HY32Z INSERTION OF MONITORING DEVICE INTO UPPER ARTERY, PERCUTANEOUS APPROACH: ICD-10-PCS | Performed by: PHYSICIAN ASSISTANT

## 2018-10-06 PROCEDURE — 99232 SBSQ HOSP IP/OBS MODERATE 35: CPT | Performed by: INTERNAL MEDICINE

## 2018-10-06 PROCEDURE — P9021 RED BLOOD CELLS UNIT: HCPCS

## 2018-10-06 PROCEDURE — 85014 HEMATOCRIT: CPT | Performed by: PHYSICIAN ASSISTANT

## 2018-10-06 PROCEDURE — 94003 VENT MGMT INPAT SUBQ DAY: CPT

## 2018-10-06 PROCEDURE — 86901 BLOOD TYPING SEROLOGIC RH(D): CPT | Performed by: PHYSICIAN ASSISTANT

## 2018-10-06 PROCEDURE — 80048 BASIC METABOLIC PNL TOTAL CA: CPT | Performed by: INTERNAL MEDICINE

## 2018-10-06 PROCEDURE — 86920 COMPATIBILITY TEST SPIN: CPT

## 2018-10-06 PROCEDURE — 94760 N-INVAS EAR/PLS OXIMETRY 1: CPT

## 2018-10-06 PROCEDURE — 93005 ELECTROCARDIOGRAM TRACING: CPT

## 2018-10-06 PROCEDURE — 85018 HEMOGLOBIN: CPT | Performed by: PHYSICIAN ASSISTANT

## 2018-10-06 PROCEDURE — 85007 BL SMEAR W/DIFF WBC COUNT: CPT | Performed by: PHYSICIAN ASSISTANT

## 2018-10-06 PROCEDURE — 99291 CRITICAL CARE FIRST HOUR: CPT | Performed by: INTERNAL MEDICINE

## 2018-10-06 RX ORDER — FUROSEMIDE 10 MG/ML
40 INJECTION INTRAMUSCULAR; INTRAVENOUS ONCE
Status: COMPLETED | OUTPATIENT
Start: 2018-10-06 | End: 2018-10-06

## 2018-10-06 RX ORDER — PROPOFOL 10 MG/ML
5-50 INJECTION, EMULSION INTRAVENOUS
Status: DISCONTINUED | OUTPATIENT
Start: 2018-10-06 | End: 2018-10-07

## 2018-10-06 RX ADMIN — ASCORBIC ACID TAB 250 MG 500 MG: 250 TAB at 08:23

## 2018-10-06 RX ADMIN — IPRATROPIUM BROMIDE AND ALBUTEROL SULFATE 3 ML: .5; 3 SOLUTION RESPIRATORY (INHALATION) at 07:33

## 2018-10-06 RX ADMIN — IPRATROPIUM BROMIDE AND ALBUTEROL SULFATE 3 ML: .5; 3 SOLUTION RESPIRATORY (INHALATION) at 19:54

## 2018-10-06 RX ADMIN — ACETYLCYSTEINE 600 MG: 200 SOLUTION ORAL; RESPIRATORY (INHALATION) at 19:54

## 2018-10-06 RX ADMIN — METRONIDAZOLE 500 MG: 500 INJECTION, SOLUTION INTRAVENOUS at 15:26

## 2018-10-06 RX ADMIN — FAMOTIDINE 20 MG: 40 POWDER, FOR SUSPENSION ORAL at 09:52

## 2018-10-06 RX ADMIN — FUROSEMIDE 40 MG: 10 INJECTION, SOLUTION INTRAMUSCULAR; INTRAVENOUS at 22:18

## 2018-10-06 RX ADMIN — CHLORHEXIDINE GLUCONATE 0.12% ORAL RINSE 15 ML: 1.2 LIQUID ORAL at 21:54

## 2018-10-06 RX ADMIN — LACTOBACILLUS ACIDOPHILUS / LACTOBACILLUS BULGARICUS 1 PACKET: 100 MILLION CFU STRENGTH GRANULES at 08:22

## 2018-10-06 RX ADMIN — QUETIAPINE 25 MG: 25 TABLET ORAL at 08:23

## 2018-10-06 RX ADMIN — VANCOMYCIN HYDROCHLORIDE 1000 MG: 1 INJECTION, SOLUTION INTRAVENOUS at 18:14

## 2018-10-06 RX ADMIN — HYDROCORTISONE SODIUM SUCCINATE 50 MG: 100 INJECTION, POWDER, FOR SOLUTION INTRAMUSCULAR; INTRAVENOUS at 11:43

## 2018-10-06 RX ADMIN — INSULIN LISPRO 2 UNITS: 100 INJECTION, SOLUTION INTRAVENOUS; SUBCUTANEOUS at 18:54

## 2018-10-06 RX ADMIN — IPRATROPIUM BROMIDE AND ALBUTEROL SULFATE 3 ML: .5; 3 SOLUTION RESPIRATORY (INHALATION) at 02:40

## 2018-10-06 RX ADMIN — QUETIAPINE 50 MG: 25 TABLET ORAL at 21:57

## 2018-10-06 RX ADMIN — METRONIDAZOLE 500 MG: 500 INJECTION, SOLUTION INTRAVENOUS at 06:06

## 2018-10-06 RX ADMIN — MUPIROCIN 1 APPLICATION: 20 OINTMENT TOPICAL at 08:22

## 2018-10-06 RX ADMIN — POLYETHYLENE GLYCOL 3350 17 G: 17 POWDER, FOR SOLUTION ORAL at 08:22

## 2018-10-06 RX ADMIN — HEPARIN SODIUM 5000 UNITS: 5000 INJECTION, SOLUTION INTRAVENOUS; SUBCUTANEOUS at 21:56

## 2018-10-06 RX ADMIN — LACOSAMIDE 100 MG: 50 TABLET, FILM COATED ORAL at 08:23

## 2018-10-06 RX ADMIN — CEFEPIME HYDROCHLORIDE 1000 MG: 1 INJECTION, SOLUTION INTRAVENOUS at 14:00

## 2018-10-06 RX ADMIN — HEPARIN SODIUM 5000 UNITS: 5000 INJECTION, SOLUTION INTRAVENOUS; SUBCUTANEOUS at 06:16

## 2018-10-06 RX ADMIN — VASOPRESSIN 0.04 UNITS/MIN: 20 INJECTION INTRAVENOUS at 07:13

## 2018-10-06 RX ADMIN — LACTOBACILLUS ACIDOPHILUS / LACTOBACILLUS BULGARICUS 1 PACKET: 100 MILLION CFU STRENGTH GRANULES at 18:05

## 2018-10-06 RX ADMIN — VASOPRESSIN 0.04 UNITS/MIN: 20 INJECTION INTRAVENOUS at 16:43

## 2018-10-06 RX ADMIN — HEPARIN SODIUM 5000 UNITS: 5000 INJECTION, SOLUTION INTRAVENOUS; SUBCUTANEOUS at 15:25

## 2018-10-06 RX ADMIN — ACETYLCYSTEINE 3 ML: 200 SOLUTION ORAL; RESPIRATORY (INHALATION) at 07:33

## 2018-10-06 RX ADMIN — LACOSAMIDE 100 MG: 50 TABLET, FILM COATED ORAL at 21:56

## 2018-10-06 RX ADMIN — IPRATROPIUM BROMIDE AND ALBUTEROL SULFATE 3 ML: .5; 3 SOLUTION RESPIRATORY (INHALATION) at 14:36

## 2018-10-06 RX ADMIN — PROPOFOL 5 MCG/KG/MIN: 10 INJECTION, EMULSION INTRAVENOUS at 12:18

## 2018-10-06 RX ADMIN — MUPIROCIN 1 APPLICATION: 20 OINTMENT TOPICAL at 21:57

## 2018-10-06 RX ADMIN — FENTANYL CITRATE 25 MCG/HR: at 03:03

## 2018-10-06 RX ADMIN — HYDROCORTISONE SODIUM SUCCINATE 50 MG: 100 INJECTION, POWDER, FOR SOLUTION INTRAMUSCULAR; INTRAVENOUS at 18:05

## 2018-10-06 RX ADMIN — HYDROCORTISONE SODIUM SUCCINATE 50 MG: 100 INJECTION, POWDER, FOR SOLUTION INTRAMUSCULAR; INTRAVENOUS at 06:16

## 2018-10-06 RX ADMIN — HYDROCORTISONE SODIUM SUCCINATE 50 MG: 100 INJECTION, POWDER, FOR SOLUTION INTRAMUSCULAR; INTRAVENOUS at 00:17

## 2018-10-06 RX ADMIN — METRONIDAZOLE 500 MG: 500 INJECTION, SOLUTION INTRAVENOUS at 21:56

## 2018-10-06 RX ADMIN — THIAMINE HYDROCHLORIDE 200 MG: 100 INJECTION, SOLUTION INTRAMUSCULAR; INTRAVENOUS at 08:30

## 2018-10-06 RX ADMIN — CHLORHEXIDINE GLUCONATE 0.12% ORAL RINSE 15 ML: 1.2 LIQUID ORAL at 08:22

## 2018-10-06 NOTE — PROCEDURES
Arterial Line Insertion  Date/Time: 10/5/2018 10:51 PM  Performed by: Nhan Vivar  Authorized by: Nhan Vivar     Patient location:  Bedside  Consent:     Consent obtained:  Emergent situation  Universal protocol:     Procedure explained and questions answered to patient or proxy's satisfaction: no      Relevant documents present and verified: yes      Test results available and properly labeled: yes      Radiology Images displayed and confirmed  If images not available, report reviewed: yes      Required blood products, implants, devices, and special equipment available: yes      Site/side marked: yes      Immediately prior to procedure a time out was called: yes      Patient identity confirmed:  Arm band and hospital-assigned identification number  Indications:     Indications: hemodynamic monitoring and multiple ABGs    Pre-procedure details:     Skin preparation:  Chlorhexidine    Preparation: Patient was prepped and draped in sterile fashion    Anesthesia (see MAR for exact dosages): Anesthesia method:  Local infiltration    Local anesthetic:  Lidocaine 1% w/o epi  Procedure details:     Location / Tip of Catheter:  Radial    Laterality:  Right    Chris's test performed: yes      Chris's test abnormal: no      Needle gauge:  20 G    Placement technique:  Seldinger    Number of attempts:  1    Successful placement: yes      Transducer: waveform confirmed    Post-procedure details:     Post-procedure:  Biopatch applied, sterile dressing applied, sutured and wrist guard applied    CMS:  Normal    Patient tolerance of procedure:   Tolerated well, no immediate complications

## 2018-10-06 NOTE — PROCEDURES
Central Line Insertion  Date/Time: 10/5/2018 5:42 PM  Performed by: Laina Graff by: Shawanda Mosqueda     Patient location:  Bedside  Other Assisting Provider: No    Consent:     Consent obtained:  Verbal    Consent given by:  Guardian    Risks discussed:  Arterial puncture, incorrect placement, nerve damage, bleeding, infection and pneumothorax    Alternatives discussed:  No treatment, delayed treatment, alternative treatment and observation  Universal protocol:     Patient identity confirmed: Anonymous protocol, patient vented/unresponsive  Pre-procedure details:     Hand hygiene: Hand hygiene performed prior to insertion      Sterile barrier technique: All elements of maximal sterile technique followed      Skin preparation:  ChloraPrep    Skin preparation agent: Skin preparation agent completely dried prior to procedure    Indications:     Central line indications: medications requiring central line, hemodynamic monitoring and no peripheral vascular access    Anesthesia (see MAR for exact dosages): Anesthesia method:  Local infiltration    Local anesthetic:  Lidocaine 1% w/o epi  Procedure details:     Location:  Left internal jugular    Approach: percutaneous technique used      Patient position:  Flat    Catheter type:  Triple lumen 16cm    Landmarks identified: yes      Ultrasound guidance: yes      Sterile ultrasound techniques: Sterile gel and sterile probe covers were used      Number of attempts:  1    Vessel of catheter tip end:  Unclear - venous - CVP transduced at 6, Falling  column positive - pt has redundant IJ  Post-procedure details:     Post-procedure:  Dressing applied and line sutured    Assessment:  Blood return through all ports and no pneumothorax on x-ray    Patient tolerance of procedure:   Tolerated well, no immediate complications

## 2018-10-06 NOTE — PROGRESS NOTES
Daily Progress Note - Critical Care/ Stepdown   Silvia García 52 y o  male MRN: [id number]  Unit/Bed#: ICU 04 Encounter: [id number]    ______________________________________________________________________  Assessment:   Principal Problem:    Septic shock ([address] )  Active Problems:    Acute respiratory failure with hypoxia ([facility])    Pneumonia of right upper lobe due to infectious organism ([address] )    Acute kidney injury ([facility] [address] )    Urinary tract infection associated with catheterization of urinary tract ([address] )    Pressure injury of right buttock, unstageable (HCC)    Seizure disorder ([facility])    Impulse control disorder    Down syndrome    Pneumonia  Resolved Problems:    Leukocytosis      Acute respiratory failure with hypoxia ([address] )   Assessment & Plan    · Currently intubated on mechanical ventilation secondary to acute on chronic hypoxic and hypercapnic respiratory failure  Large amount of secretions  Sputum culture pending  · CXR on admission with right pneumonia with saritha pneumonic effusion  Concern for left sided infiltrate as well  · Repeat CXR with AM with persistent opacities as well as concern for CHF  · Concern for inability to handle secretions, will add flagyl for the time being, positive for MRSA broaden out antibiotics to include vancomycin       * Septic shock ([address] )   Assessment & Plan    · Admitted 10/2 with sepsis secondary to UTI and pneumonia  · Improved and transferred to the floor on 10/4 , and return early morning 10 5 with hypoxia and hypotension  · White count continues to improve  Procalcitonin improving  · Became hypotensive yesterday after intubation  Given 1 L normal saline and started on Levophed  Levophed use escalated to 15 at, at that time vasopressin and steroids were added  Initially started on high-dose vitamin C and thiamine, however in light of renal dysfunction will hold on further ascorbic acid    · Echocardiogram with normal EF no significant valvular dysfunction, however significant pulmonary artery pressures which could be consistent with volume overload  Hold on diuresis in light of vasopressin use  Monitor urine output, consider Lasix later  · CXR on admission:   · Diffuse pneumonia in the right lung  Suspected right-sided pleural effusion  · UA on admission:   · + Leukocytes, Large Blood, Innumerable WBC/Bacteria  · Procalcitonin on admission: 10 72, now 2 5  · BCX x2: Proteus  · Influenza A/B: Negative  · Sputum Cx and GS: Pending  · UCx: E coli  · Continue Day#5: Cefepime, day 3 Flagyl, Day 2 Vancomycin       Pneumonia of right upper lobe due to infectious organism [facility])   Assessment & Plan    · Right sided pneumonia with left sided infiltrate blossoming as well  · Procalcitonin: 2 7  · Influenza A/B: Negative  · BCx x2: proteus  · Sputum CX/GS Pending  · Continue Day#5: Cefepime, day 3 flagyl, day 2 vancomycin       Acute kidney injury ([facility] [address] )   Assessment & Plan    · Baseline Cr: 0 9-1  · Cr on admission: 2 79 - continues to worsen -  now 3 37  · Will monitor with daily CMP  · Nephrology following  Hold diuresis 2/2 vasopressor requirement     Urinary tract infection associated with catheterization of urinary tract (HCC)   Assessment & Plan    · UA on admission:   · + Leukocytes, Large Blood, Innumerable WBC/Bacteria  · UCx with E coli  ? Colonizer given suprapubic catheter  · Hx of Suprapubic Catheter   · Continue ABx therapy As Above     Leukocytosisresolved as of 10/6/2018   Assessment & Plan    · Resolved  · Continues with bandemia   · WBC on admission: 20 74  · Current WBC: 13 51  · Will monitor with daily CBC     Pressure injury of right buttock, unstageable (HCC)   Assessment & Plan    · POA  · Continue Daily Wound Care  · Frequent Turning and Offloading     Seizure disorder ([facility] [address] )   Assessment & Plan    · Continue home medications:   · Seroquel 25mg qd morning and 50mg qd bedtime     Down syndrome   Assessment & Plan    · At Baseline     Impulse control disorder Assessment & Plan    · At Baseline  · Agitation Control prn         Plan:    Neuro:   · Sedation plan: Fentanyl  · RASS goal: -1 Drowsy and 0 Alert and Calm  · Sedation break plan: daily    · Delirium: CAM ICU positive no   · If yes, intervention:  Environmental controls  Regulate sleep-wake cycle  · Pain controlled with:  P r n  Fentanyl  · Pain score: none  · Regulate sleep/wake cycle    CV:   · Cardiac infusions: Levophed, for mcg/min, Vasopressin, 0 04 mcg/min  · Rhythm: NSR  · Follow rhythm on telemetry    Pulm:   · Acute on chronic hypoxic and hypercarbic respiratory failure secondary to pneumonia and volume overload  · SBT plan:  Daily  · Chest PT ordered: yes   · Chlorhexidine ordered: yes   · HOB >30 degrees: yes     GI:   · Nutrition/diet plan:  Tube feeding 50 mL/hr Jevity 1 5, free water flush 150 q 4 hours  · Stress ulcer prophylaxis: Pepcid PO  · Bowel regimen: Miralax and Dulcolax Suppository  · Last BM: 10/6    FEN:   · No IVF  · Fluid/Diuretic plan: No intervention - pt will need diuresis as clinical condition improves  · Goal 24 hour fluid balance: Net even  · Electrolytes repleted: yes  · Goal: K >4 0, Mag >2 0, and Phos >3 0    :   · Indwelling Godoy present: yes - Superpubic catheter  · Urinary catheter still needed for Strict I and O in a critically ill patient and Chronic Godoy  ID:   · Severe sepsis with septic shock secondary to UTI and pneumonia  · Abx ordered: Cefepime, Flagyl and Vanco  · Trend temps and WBC count    Heme:   · Anemia:  Hemoglobin 6 5  Patient will require 1 unit packed red blood cells  [employer] were attempted to be contacted  Awaiting call back  Since patient is hypotensive and on pressors will give 1 unit packed red blood cells emergently  · Trend hgb and plts    Endo:   · Blood sugars well controlled at this time    Goal under 180      Msk/Skin:  · Mobility goal:  Bed rest  · PT and OT when patient's clinical condition improves  · Frequent turning and off-loading    Family:  · Family updated within 24 hours: yes Sasha Bermudez [facility] has been contacted regarding patient's current clinical condition  · Family meeting planned today: no     Lines:  · Central venous access: triple lumen catheter - 20 cm  · Keep central line today for meds requiring central line, hemodynamic monitoring  · Arterial line: Assessed  Continued for the following reasons Hemodynamic monitoring and frequent blood draws  VTE Prophylaxis:  · Pharmacologic Prophylaxis: Heparin  · Mechanical Prophylaxis: sequential compression device    Disposition: Continue ICU care    Code Status: Level 1 - Full Code    Counseling / Coordination of Care  Total Critical Care time spent 48 minutes excluding procedures, teaching and family updates  ______________________________________________________________________    HPI/24hr events:  Patient continues with decreasing pressor requirements       ______________________________________________________________________    Physical Exam:   Physical Exam   Constitutional: Vital signs are normal  He is sleeping and uncooperative  He is easily aroused  He appears ill  He is sedated, intubated and restrained  HENT:   Head: Normocephalic and atraumatic  Eyes: Pupils are equal, round, and reactive to light  No scleral icterus  Neck: JVD present  Right weaning torticollis   Cardiovascular: Normal rate and regular rhythm  Exam reveals no gallop and no friction rub  No murmur heard  Pulmonary/Chest: Effort normal  He is intubated  No respiratory distress  Abdominal: Soft  He exhibits no distension  There is no tenderness  Musculoskeletal:   Lower extremity contractures   Neurological: He is easily aroused  No cranial nerve deficit  Skin: Skin is warm and dry  Capillary refill takes less than 2 seconds  There is pallor         ______________________________________________________________________  Vitals:    10/06/18 1239 10/06/18 1300 10/06/18 1319 10/06/18 1330   BP:  112/57 112/50 (!) 109/48   Pulse:  74 68 69   Resp:  20 13 15   Temp:   (!) 97 2 °F (36 2 °C) (!) 97 3 °F (36 3 °C)   TempSrc:    Temporal   SpO2: 96% 97%  98%   Weight:       Height:         Arterial Line BP: 110/47  Arterial Line MAP (mmHg): 69 mmHg     Temperature:   Temp (24hrs), Av °F (36 1 °C), Min:96 7 °F (35 9 °C), Max:97 3 °F (36 3 °C)    Current Temperature: (!) 97 3 °F (36 3 °C)    Weights:   IBW: 63 8 kg    Body mass index is 24 09 kg/m²  Weight (last 2 days)     Date/Time   Weight    10/06/18 0600  67 7 (149 25)              Hemodynamic Monitoring:  N/A  CVP:  [10 mmHg-14 mmHg] 10 mmHg  CO:  [5 6 L/min-8 8 L/min] 6 7 L/min  CI:  [3 3 L/min/m2-5 2 L/min/m2] 3 9 L/min/m2    Non-Invasive/Invasive Ventilation Settings:  Respiratory    Lab Data (Last 4 hours)    None         O2/Vent Data (Last 4 hours)      10/06 1239           Vent Mode CPAP/PS Spont       MV (Obs) 7 6                 Lab Results   Component Value Date    PHART 7 371 10/06/2018    [id number] 45 0 (H) 10/06/2018    PO2ART 77 9 10/06/2018    VSP8THT 25 5 10/06/2018    BEART 0 1 10/06/2018    SOURCE Line, Arterial 10/05/2018         Intake and Outputs:  I/O       10/04 0701 - 10/05 0700 10/05 0701 - 10/06 0700 10/06 0701 - 10/07 07    P  O  60      I V  (mL/kg)  497 6 (7 4)     IV Piggyback  450     Total Intake(mL/kg) 60 (1) 947 6 (14)     Urine (mL/kg/hr) 1010 (0 7) 1335 (0 8)     Total Output 1010 1335      Net -950 -387  4             Unmeasured Stool Occurrence 1 x              Nutrition:        Diet Orders            Start     Ordered    10/04/18 1718  Diet Enteral/Parenteral; Tube Feeding No Oral Diet; Jevity 1 5; Continuous; 50; 150;  Water; Every 4 hours  Diet effective now     Comments:  Give at 50ml/hr until 1035 is infused in 22 hours with H2O flush of 150ml/hr q4h   Question Answer Comment   Diet Type Enteral/Parenteral    Enteral/Parenteral Tube Feeding No Oral Diet    Tube Feeding Formula: Jevity 1 5    Bolus/Cyclic/Continuous Continuous    Tube Feeding Goal Rate (mL/hr): 50    Tube Feeding water flush (mL): 150    Water Flush type: Water    Water flush frequency: Every 4 hours    RD to adjust diet per protocol? Yes        10/04/18 1717    10/02/18 1926  Room Service  Once     Question:  Type of Service  Answer:  Room Service- Not Appropriate    10/02/18 1925            Labs:     Results from last 7 days  Lab Units 10/06/18  0552 10/05/18  2107 10/05/18  0247   WBC Thousand/uL 9 97 12 12* 12 85*   HEMOGLOBIN g/dL 6 9* 7 3* 7 0*   HEMATOCRIT % 21 3* 23 1* 22 3*   PLATELETS Thousands/uL 81* 88* 99*   MONO PCT MAN % 1* 15* 8       Results from last 7 days  Lab Units 10/06/18  0552 10/05/18  1836 10/05/18  0247 10/04/18  0433  10/02/18  1156   SODIUM mmol/L 141 141 140 137  < > 135*   POTASSIUM mmol/L 4 9 4 2 4 5 4 7  < > 4 9   CHLORIDE mmol/L 104 106 106 104  < > 100   CO2 mmol/L 24 26 28 26  < > 31   BUN mg/dL 65* 61* 61* 60*  < > 66*   CREATININE mg/dL 3 37* 2 99* 2 94* 2 74*  < > 2 79*   CALCIUM mg/dL 8 3 8 2* 8 0* 8 3  < > 9 0   ALK PHOS U/L  --  142*  --  150*  --  138*   ALT U/L  --  21  --  28  --  33   AST U/L  --  25  --  45  --  74*   < > = values in this interval not displayed      Results from last 7 days  Lab Units 10/06/18  0552 10/05/18  0247 10/04/18  0433   MAGNESIUM mg/dL 2 3 2 6 2 4     Lab Results   Component Value Date    PHOS 5 9 (H) 10/06/2018    PHOS 4 8 (H) 10/05/2018    PHOS 3 7 10/04/2018        Results from last 7 days  Lab Units 10/02/18  1156   INR  1 25*   PTT seconds 30     No results found for: TROPONINI    Results from last 7 days  Lab Units 10/05/18  0350 10/02/18  1400 10/02/18  1155   LACTIC ACID mmol/L 1 0 1 4 3 3*     ABG:  Lab Results   Component Value Date    PHART 7 371 10/06/2018    [id number] 45 0 (H) 10/06/2018    PO2ART 77 9 10/06/2018    NBQ8RUE 25 5 10/06/2018    BEART 0 1 10/06/2018    SOURCE Line, Arterial 10/05/2018       Imaging: CXR:   XR chest portable ICU   Final Result   1  Left internal jugular central venous catheter is to the left of the spine  Please see follow-up chest radiograph report  2   Bilateral lung opacities as above  Workstation performed: [id number]         XR chest portable   Final Result   1  Left internal jugular central venous catheter is either within a duplicated SVC or within the arterial system  I discussed this with Ez Richardson 10/5/2018 at approximately 8:40 PM    2   Large right pleural effusion is unchanged  Left lung opacity is unchanged  Workstation performed: [id number]         XR chest portable   Final Result   Endotracheal tube tip projects over the trachea just above the wilda  Increasing right-sided pleural effusion with persistent left-sided airspace opacities noted  Better upright repeat film suggested  Workstation performed: [id number]         US kidney and bladder   Final Result      1   Echogenic kidneys consistent with chronic medical renal disease  2   Bilateral nonobstructing renal calculi  3   Small right renal cyst       4   Diffusely thick walled bladder though completely collapsed  Correlation for cystitis recommended  5   Mild free fluid  Workstation performed: [id number]         XR chest portable   Final Result      Prominence of the cardiac silhouette with increasing perihilar airspace opacities and bilateral pleural effusions/atelectasis, suspect worsening fluid overload/CHF  Superimposed infection could be considered in the appropriate clinical setting  No other significant interval change  Workstation performed: [id number]         XR chest portable   Final Result      Stable right-sided pneumonia with parapneumonic effusion  New left-sided opacity which may also be infectious  The study was marked in [facility] for immediate notification        Workstation performed: [id number]         XR chest portable - 1 view Final Result      Diffuse pneumonia in the right lung  Suspected right-sided pleural effusion  Workstation performed: [id number]            I have personally reviewed pertinent reports     and I have personally reviewed pertinent films in PACS      Micro:  Lab Results   Component Value Date    BLOODCX Proteus mirabilis (A) 10/02/2018    BLOODCX Morganella morganii (A) 10/02/2018    BLOODCX Proteus mirabilis (A) 10/02/2018    BLOODCX Morganella morganii (A) 10/02/2018    URINECX >100,000 cfu/ml Escherichia coli (A) 10/02/2018    URINECX >100,000 cfu/ml Morganella morganii (A) 10/02/2018    URINECX (A) 10/02/2018     >100,000 cfu/ml Beta Hemolytic Streptococcus Group G    SPUTUMCULTUR Culture too young- will reincubate 10/05/2018       Allergies: No Known Allergies  Medications:   Scheduled Meds:    Current Facility-Administered Medications:  acetylcysteine 3 mL Nebulization Q12H Yuli Kulkarni MD    bisacodyl 10 mg Rectal Daily PRN Beau Alexander MD    cefepime 1,000 mg Intravenous Q24H Beau Alexander MD Last Rate: Stopped (10/05/18 1528)   chlorhexidine 15 mL Swish & Spit Q12H [facility] Corby Faust PA-C    famotidine 20 mg Per G Tube Daily Mara Gonzalez PA-C    fentaNYL 50 mcg Intravenous Q2H PRN Corby Faust PA-C    fentaNYL 25 mcg/hr Intravenous Continuous Zeyad Bernheim, CRNP Last Rate: 25 mcg/hr (10/06/18 0303)   fentanyl citrate (PF) 25 mcg Intravenous Q2H PRN Corby Faust PA-C    heparin (porcine) 5,000 Units Subcutaneous Q8H Cristiane Cai MD    hydrocortisone sodium succinate 50 mg Intravenous Q6H [facility] Mara Gonzalez PA-C    ipratropium-albuterol 3 mL Nebulization Q6H Sallie Treadwell MD    lacosamide 100 mg Per G Tube Q12H Cristiane Cai MD    lactobacillus acidophilus-bulgaricus 1 packet Oral BID Beau Alexander MD    metroNIDAZOLE 500 mg Intravenous Q8H Neetu Melvin PA-C Last Rate: 500 mg (10/06/18 0606)   mupirocin  Nasal Q12H Cristiane Cai MD norepinephrine 1-30 mcg/min Intravenous Titrated Danielae Line, PA-C Last Rate: 2 mcg/min (10/06/18 1305)   polyethylene glycol 17 g Per G Tube Daily Michelle Pires MD    propofol 5-50 mcg/kg/min Intravenous Titrated Lorene Darting, CRNP Last Rate: 10 mcg/kg/min (10/06/18 1254)   QUEtiapine 25 mg Per G Tube QAM Michelle Pires MD    QUEtiapine 50 mg Per G Tube HS Michelle Pires MD    thiamine 200 mg Intravenous Daily Danielae Line, PA-C Last Rate: Stopped (10/06/18 0900)   vancomycin 15 mg/kg Intravenous Q24H Danielae Line, PA-C Last Rate: Stopped (10/05/18 1904)   vasopressin (PITRESSIN) in 0 9 % sodium chloride 100 mL 0 04 Units/min Intravenous Continuous Danielae Line, PA-C Last Rate: 0 04 Units/min (10/06/18 0713)     Continuous Infusions:    fentaNYL 25 mcg/hr Last Rate: 25 mcg/hr (10/06/18 0303)   norepinephrine 1-30 mcg/min Last Rate: 2 mcg/min (10/06/18 1305)   propofol 5-50 mcg/kg/min Last Rate: 10 mcg/kg/min (10/06/18 1254)   vasopressin (PITRESSIN) in 0 9 % sodium chloride 100 mL 0 04 Units/min Last Rate: 0 04 Units/min (10/06/18 0713)     PRN Meds:    bisacodyl 10 mg Daily PRN   fentaNYL 50 mcg Q2H PRN   fentanyl citrate (PF) 25 mcg Q2H PRN       Invasive lines and devices:   Invasive Devices     Central Venous Catheter Line            CVC Central Lines 10/05/18 Triple 20cm less than 1 day          Peripheral Intravenous Line            Peripheral IV 10/02/18 Left Antecubital 4 days    Long-Dwell Peripheral IV (Midline) 22/54/50 Right Basilic 2 days          Arterial Line            Arterial Line 10/05/18 Right Radial less than 1 day          Drain            Gastrostomy/Enterostomy Percutaneous endoscopic gastrostomy (PEG) LLQ -- days    Suprapubic Catheter 18 Fr  -- days    Urethral Catheter 18 Fr  -- days          Airway            ETT  Hi-Lo 8 mm 1 day                   SIGNATURE: Kev Line, PA-C  DATE: October 6, 2018

## 2018-10-06 NOTE — PROCEDURES
Central Line Insertion  Date/Time: 10/5/2018 10:02 PM  Performed by: Zenon Theodore  Authorized by: Zenon Theodore     Patient location:  Bedside  Other Assisting Provider: No    Consent:     Consent obtained:  Emergent situation    Consent given by:  Healthcare agent    Alternatives discussed:  No treatment  Universal protocol:     Procedure explained and questions answered to patient or proxy's satisfaction: yes      Relevant documents present and verified: yes      Test results available and properly labeled: yes      Radiology Images displayed and confirmed  If images not available, report reviewed: yes      Required blood products, implants, devices, and special equipment available: yes      Site/side marked: yes      Immediately prior to procedure, a time out was called: yes      Patient identity confirmed:  Arm band and hospital-assigned identification number  Pre-procedure details:     Hand hygiene: Hand hygiene performed prior to insertion      Sterile barrier technique: All elements of maximal sterile technique followed      Skin preparation:  ChloraPrep    Skin preparation agent: Skin preparation agent completely dried prior to procedure    Indications:     Central line indications: medications requiring central line and hemodynamic monitoring    Anesthesia (see MAR for exact dosages):      Anesthesia method:  Local infiltration    Local anesthetic:  Lidocaine 1% w/o epi  Procedure details:     Location:  Right femoral    Vessel type: vein      Approach: percutaneous technique used      Patient position:  Flat    Catheter type:  Triple lumen 20cm    Catheter size:  7 Fr    Landmarks identified: yes      Ultrasound guidance: yes      Sterile ultrasound techniques: Sterile gel and sterile probe covers were used      Number of attempts:  1    Successful placement: yes    Post-procedure details:     Post-procedure:  Dressing applied and line sutured    Assessment:  Blood return through all ports    Post-procedure complications: none      Patient tolerance of procedure: Tolerated well, no immediate complications  Comments:      Nanametry used to confirm venous placement  Ultrasound visualized wire in vessel  CVP reading 10

## 2018-10-06 NOTE — PROCEDURES
Central Line Insertion  Date/Time: 10/5/2018 8:04 PM  Performed by: Guanakito Loco  Authorized by: Guanakito Loco     Patient location:  Bedside  Other Assisting Provider: No    Consent:     Consent obtained:  Emergent situation    Consent given by:  Healthcare agent    Alternatives discussed:  No treatment  Universal protocol:     Procedure explained and questions answered to patient or proxy's satisfaction: yes      Relevant documents present and verified: yes      Test results available and properly labeled: yes      Radiology Images displayed and confirmed  If images not available, report reviewed: yes      Required blood products, implants, devices, and special equipment available: yes      Site/side marked: yes      Immediately prior to procedure, a time out was called: yes      Patient identity confirmed:  Hospital-assigned identification number and arm band  Pre-procedure details:     Hand hygiene: Hand hygiene performed prior to insertion      Sterile barrier technique: All elements of maximal sterile technique followed      Skin preparation:  ChloraPrep    Skin preparation agent: Skin preparation agent completely dried prior to procedure    Indications:     Central line indications: medications requiring central line and hemodynamic monitoring    Anesthesia (see MAR for exact dosages):      Anesthesia method:  None  Procedure details:     Location:  Left internal jugular    Vessel type: vein      Approach: percutaneous technique used      Patient position:  Flat    Catheter type:  Triple lumen 20cm    Catheter size:  7 Fr    Landmarks identified: yes      Ultrasound guidance: yes      Sterile ultrasound techniques: Sterile gel and sterile probe covers were used      Number of attempts:  1    Successful placement: no    Post-procedure details:     Post-procedure:  Dressing applied and line sutured    Assessment:  Blood return through all ports and no pneumothorax on x-ray    Post-procedure complications: none      Patient tolerance of procedure: Tolerated well, no immediate complications  Comments:      Central line changed over guidewire from 16 cm to 20 cm  Wire visualized with ultrasound guidance  CVP reading 13  ABG demonstrates PO2 of 45 6 which demonstrates line is likely venous, however, per discussion with radiologist Left internal jugular central venous catheter is either within a duplicated SVC or within the arterial system  Line is otherwise not at appropriate placement

## 2018-10-06 NOTE — PROGRESS NOTES
NEPHROLOGY PROGRESS NOTE    Kenyetta Zamora 52 y o  male MRN: [id number]  Unit/Bed#: ICU 04 Encounter: [id number]  Reason for Consult:  Acute renal failure    Patient was intubated yesterday  Is now on ventilator with restraints no distress  ASSESSMENT/PLAN:  1  Renal    The patient has acute renal failure due to ATN and he is nonoliguric over the last 24 hours although he is relatively low urine output this morning  Creatinine was still on the rise  Initially was given fluids presumptively due to volume depletion on presentation but these were held due to respiratory distress  At this point will monitor with supportive care as his respiratory issues are multifactorial with potential pulmonary edema but also infection  Electrolytes are acceptable  Continue supportive care  Keep volume status euvolemic    2  Sepsis    Urinary tract infection and bacteremia with possible pneumonia  On antibiotics  Respiratory status was worsened and patient is now intubated  ICU team to manage  SUBJECTIVE:  ROS  The patient is unable to give me any review of system  OBJECTIVE:  Current Weight: Weight - Scale: 67 7 kg (149 lb 4 oz)  Vitals:Temp (24hrs), Av 9 °F (36 1 °C), Min:96 7 °F (35 9 °C), Max:97 2 °F (36 2 °C)  Current: Temperature: (!) 97 °F (36 1 °C)   Blood pressure 106/55, pulse 66, temperature (!) 97 °F (36 1 °C), temperature source Temporal, resp  rate 21, height 5' 6" (1 676 m), weight 67 7 kg (149 lb 4 oz), SpO2 95 %  , Body mass index is 24 09 kg/m²  Intake/Output Summary (Last 24 hours) at 10/06/18 0855  Last data filed at 10/06/18 0800   Gross per 24 hour   Intake           947 63 ml   Output             1145 ml   Net          -197 37 ml       Physical Exam: /55   Pulse 66   Temp (!) 97 °F (36 1 °C) (Temporal)   Resp 21   Ht 5' 6" (1 676 m)   Wt 67 7 kg (149 lb 4 oz)   SpO2 95%   BMI 24 09 kg/m²   Physical Exam   Constitutional: No distress  HENT:   Orally intubated     Eyes: No scleral icterus  Neck: No JVD present  Cardiovascular: Normal rate  Exam reveals no friction rub  Pulmonary/Chest: No respiratory distress  He has wheezes  Positive rhonchi   Abdominal: Soft  He exhibits no distension         Medications:    Current Facility-Administered Medications:     acetylcysteine (MUCOMYST) 200 mg/mL inhalation solution 600 mg, 3 mL, Nebulization, Q12H, Jenise Osuna MD, 3 mL at 10/06/18 0733    ascorbic acid (VITAMIN C) tablet 500 mg, 500 mg, Oral, Daily, Mara Gonzalez PA-C, 500 mg at 10/06/18 3905    bisacodyl (DULCOLAX) rectal suppository 10 mg, 10 mg, Rectal, Daily PRN, Kellie Brian MD    cefepime (MAXIPIME) IVPB (premix) 1,000 mg, 1,000 mg, Intravenous, Q24H, Kellie Brian MD, Stopped at 10/05/18 1528    chlorhexidine (PERIDEX) 0 12 % oral rinse 15 mL, 15 mL, Swish & Spit, Q12H [facility], Mara Gonzalez PA-C, 15 mL at 10/06/18 7443    famotidine (PEPCID) oral suspension 20 mg, 20 mg, Per G Tube, Daily, Mara Gonzalez PA-C, 20 mg at 10/05/18 0911    fentaNYL (SUBLIMAZE) injection 50 mcg, 50 mcg, Intravenous, Q2H PRN, Claude Schroeder, PA-C, 50 mcg at 10/05/18 1938    fentaNYL 1250 mcg in sodium chloride 0 9% 125mL drip, 25 mcg/hr, Intravenous, Continuous, REMEDIOS Jaimes, Last Rate: 2 5 mL/hr at 10/06/18 0303, 25 mcg/hr at 10/06/18 0303    fentanyl citrate (PF) 100 MCG/2ML 25 mcg, 25 mcg, Intravenous, Q2H PRN, Claude Schroeder, PA-C    heparin (porcine) subcutaneous injection 5,000 Units, 5,000 Units, Subcutaneous, Q8H [facility], Kellie Brian MD, 5,000 Units at 10/06/18 0616    hydrocortisone sodium succinate (PF) (Solu-CORTEF) injection 50 mg, 50 mg, Intravenous, Q6H [facility], Mara Gonzalez PA-C, 50 mg at 10/06/18 0616    ipratropium-albuterol (DUO-NEB) 0 5-2 5 mg/3 mL inhalation solution 3 mL, 3 mL, Nebulization, Q6H, Melissa Tony MD, 3 mL at 10/06/18 0733    lacosamide (VIMPAT) tablet 100 mg, 100 mg, Per Katja Hilton, Q12H [facility], Kellie Brian MD, 100 mg at 10/06/18 4536    lactobacillus acidophilus-bulgaricus [facility]) packet 1 packet, 1 packet, Oral, BID, Jose Antonio Canada MD, 1 packet at 10/06/18 0822    metroNIDAZOLE (FLAGYL) IVPB (premix) 500 mg, 500 mg, Intravenous, Q8H, Neetu Melvin PA-C, Last Rate: 200 mL/hr at 10/06/18 0606, 500 mg at 10/06/18 0606    mupirocin (BACTROBAN) 2 % nasal ointment, , Nasal, Q12H [address], Jose Antonio Canada MD, 1 application at 59/04/52 0822    norepinephrine (LEVOPHED) 8 mg (DOUBLE CONCENTRATION) IV in sodium chloride 0 9% 250 mL, 1-30 mcg/min, Intravenous, Titrated, Rosas Flores PA-C, Last Rate: 7 5 mL/hr at 10/06/18 0400, 4 mcg/min at 10/06/18 0400    polyethylene glycol (MIRALAX) packet 17 g, 17 g, Per G Tube, Daily, Jose Antonio Canada MD, 17 g at 10/06/18 0611    propofol (DIPRIVAN) 1000 mg in 100 mL infusion (premix), 5-50 mcg/kg/min, Intravenous, Titrated, REMEDIOS Jaimes    QUEtiapine (SEROquel) tablet 25 mg, 25 mg, Per G Tube, QAM, Jose Antonio Canada MD, 25 mg at 10/06/18 0823    QUEtiapine (SEROquel) tablet 50 mg, 50 mg, Per G Tube, HS, Jose Antonio Canada MD, 50 mg at 10/05/18 2328    thiamine (VITAMIN B1) 200 mg in sodium chloride 0 9 % 50 mL IVPB, 200 mg, Intravenous, Daily, Mara Gonzalez PA-C, Last Rate: 100 mL/hr at 10/06/18 0830, 200 mg at 10/06/18 0830    vancomycin (VANCOCIN) IVPB (premix) 1,000 mg, 15 mg/kg, Intravenous, Q24H, Mara Gonzalez PA-C, Stopped at 10/05/18 1904    vasopressin (PITRESSIN) 20 Units in sodium chloride 0 9 % 100 mL infusion, 0 04 Units/min, Intravenous, Continuous, Rosas Na, PA-C, Last Rate: 12 mL/hr at 10/06/18 0713, 0 04 Units/min at 10/06/18 0713    Laboratory Results:  Lab Results   Component Value Date    WBC 9 97 10/06/2018    HGB 6 9 (LL) 10/06/2018    HCT 21 3 (L) 10/06/2018     (H) 10/06/2018    PLT 81 (L) 10/06/2018     Lab Results   Component Value Date    CALCIUM 8 3 10/06/2018     10/06/2018    K 4 9 10/06/2018    CO2 24 10/06/2018     10/06/2018    BUN 65 (H) 10/06/2018    CREATININE 3 37 (H) 10/06/2018     Lab Results   Component Value Date    CALCIUM 8 3 10/06/2018    PHOS 5 9 (H) 10/06/2018     No results found for: LABPROT

## 2018-10-06 NOTE — PROCEDURES
Arterial Line Insertion  Date/Time: 10/6/2018 7:35 AM  Performed by: Laina Graff by: Shawanda Mosqueda     Patient location:  Bedside  Consent:     Consent obtained:  Verbal    Consent given by:  Guardian    Risks discussed:  Bleeding, ischemia, repeat procedure, infection and pain  Universal protocol:     Patient identity confirmed: Anonymous protocol, patient vented/unresponsive  Indications:     Indications: hemodynamic monitoring and multiple ABGs    Pre-procedure details:     Skin preparation:  Chlorhexidine    Preparation: Patient was prepped and draped in sterile fashion    Anesthesia (see MAR for exact dosages): Anesthesia method:  Local infiltration    Local anesthetic:  Lidocaine 1% w/o epi  Procedure details:     Location / Tip of Catheter:  Radial    Laterality:  Left    Chris's test performed: yes      Chris's test abnormal: no      Placement technique:  Percutaneous    Number of attempts:  1    Successful placement: yes      Transducer: waveform confirmed    Post-procedure details:     Post-procedure:  Biopatch applied, sterile dressing applied, secured with tape, sutured and wrist guard applied    CMS:  Unchanged    Patient tolerance of procedure:   Tolerated well, no immediate complications

## 2018-10-07 ENCOUNTER — APPOINTMENT (INPATIENT)
Dept: RADIOLOGY | Facility: HOSPITAL | Age: 53
DRG: 871 | End: 2018-10-07
Payer: MEDICARE

## 2018-10-07 LAB
ABO GROUP BLD BPU: NORMAL
ALBUMIN SERPL BCP-MCNC: 1.6 G/DL (ref 3.5–5)
ALP SERPL-CCNC: 131 U/L (ref 46–116)
ALT SERPL W P-5'-P-CCNC: 18 U/L (ref 12–78)
ANION GAP SERPL CALCULATED.3IONS-SCNC: 11 MMOL/L (ref 4–13)
ANISOCYTOSIS BLD QL SMEAR: PRESENT
AST SERPL W P-5'-P-CCNC: 20 U/L (ref 5–45)
BACTERIA BLD CULT: ABNORMAL
BACTERIA SPT RESP CULT: NORMAL
BASOPHILS # BLD MANUAL: 0.17 THOUSAND/UL (ref 0–0.1)
BASOPHILS NFR MAR MANUAL: 1 % (ref 0–1)
BILIRUB SERPL-MCNC: 0.4 MG/DL (ref 0.2–1)
BPU ID: NORMAL
BUN SERPL-MCNC: 73 MG/DL (ref 5–25)
CA-I BLD-SCNC: 1.11 MMOL/L (ref 1.12–1.32)
CALCIUM SERPL-MCNC: 8.2 MG/DL (ref 8.3–10.1)
CHLORIDE SERPL-SCNC: 105 MMOL/L (ref 100–108)
CO2 SERPL-SCNC: 25 MMOL/L (ref 21–32)
CREAT SERPL-MCNC: 3.4 MG/DL (ref 0.6–1.3)
CROSSMATCH: NORMAL
EOSINOPHIL # BLD MANUAL: 0 THOUSAND/UL (ref 0–0.4)
EOSINOPHIL NFR BLD MANUAL: 0 % (ref 0–6)
ERYTHROCYTE [DISTWIDTH] IN BLOOD BY AUTOMATED COUNT: 16.7 % (ref 11.6–15.1)
GFR SERPL CREATININE-BSD FRML MDRD: 19 ML/MIN/1.73SQ M
GLUCOSE SERPL-MCNC: 136 MG/DL (ref 65–140)
GLUCOSE SERPL-MCNC: 145 MG/DL (ref 65–140)
GLUCOSE SERPL-MCNC: 165 MG/DL (ref 65–140)
GLUCOSE SERPL-MCNC: 167 MG/DL (ref 65–140)
GRAM STN SPEC: ABNORMAL
GRAM STN SPEC: ABNORMAL
GRAM STN SPEC: NORMAL
HCT VFR BLD AUTO: 22.7 % (ref 36.5–49.3)
HGB BLD-MCNC: 7.5 G/DL (ref 12–17)
HYPERCHROMIA BLD QL SMEAR: PRESENT
LG PLATELETS BLD QL SMEAR: PRESENT
LYMPHOCYTES # BLD AUTO: 1 THOUSAND/UL (ref 0.6–4.47)
LYMPHOCYTES # BLD AUTO: 6 % (ref 14–44)
MAGNESIUM SERPL-MCNC: 2.3 MG/DL (ref 1.6–2.6)
MCH RBC QN AUTO: 33.5 PG (ref 26.8–34.3)
MCHC RBC AUTO-ENTMCNC: 33 G/DL (ref 31.4–37.4)
MCV RBC AUTO: 101 FL (ref 82–98)
MONOCYTES # BLD AUTO: 1.34 THOUSAND/UL (ref 0–1.22)
MONOCYTES NFR BLD: 8 % (ref 4–12)
NEUTROPHILS # BLD MANUAL: 14.23 THOUSAND/UL (ref 1.85–7.62)
NEUTS BAND NFR BLD MANUAL: 19 % (ref 0–8)
NEUTS SEG NFR BLD AUTO: 66 % (ref 43–75)
NRBC BLD AUTO-RTO: 0 /100 WBCS
PHOSPHATE SERPL-MCNC: 5.8 MG/DL (ref 2.7–4.5)
PLATELET # BLD AUTO: 80 THOUSANDS/UL (ref 149–390)
PLATELET BLD QL SMEAR: ABNORMAL
PMV BLD AUTO: 12.5 FL (ref 8.9–12.7)
POTASSIUM SERPL-SCNC: 4 MMOL/L (ref 3.5–5.3)
PROCALCITONIN SERPL-MCNC: 2.24 NG/ML
PROT SERPL-MCNC: 6 G/DL (ref 6.4–8.2)
RBC # BLD AUTO: 2.24 MILLION/UL (ref 3.88–5.62)
RBC MORPH BLD: PRESENT
SODIUM SERPL-SCNC: 141 MMOL/L (ref 136–145)
TOTAL CELLS COUNTED SPEC: 100
UNIT DISPENSE STATUS: NORMAL
UNIT PRODUCT CODE: NORMAL
UNIT RH: NORMAL
WBC # BLD AUTO: 16.74 THOUSAND/UL (ref 4.31–10.16)

## 2018-10-07 PROCEDURE — 82330 ASSAY OF CALCIUM: CPT | Performed by: NURSE PRACTITIONER

## 2018-10-07 PROCEDURE — 84100 ASSAY OF PHOSPHORUS: CPT | Performed by: PHYSICIAN ASSISTANT

## 2018-10-07 PROCEDURE — 80053 COMPREHEN METABOLIC PANEL: CPT | Performed by: PHYSICIAN ASSISTANT

## 2018-10-07 PROCEDURE — 71045 X-RAY EXAM CHEST 1 VIEW: CPT

## 2018-10-07 PROCEDURE — 85007 BL SMEAR W/DIFF WBC COUNT: CPT | Performed by: PHYSICIAN ASSISTANT

## 2018-10-07 PROCEDURE — 85027 COMPLETE CBC AUTOMATED: CPT | Performed by: PHYSICIAN ASSISTANT

## 2018-10-07 PROCEDURE — 84145 PROCALCITONIN (PCT): CPT | Performed by: PHYSICIAN ASSISTANT

## 2018-10-07 PROCEDURE — 82948 REAGENT STRIP/BLOOD GLUCOSE: CPT

## 2018-10-07 PROCEDURE — 82542 COL CHROMOTOGRAPHY QUAL/QUAN: CPT | Performed by: PHYSICIAN ASSISTANT

## 2018-10-07 PROCEDURE — 94003 VENT MGMT INPAT SUBQ DAY: CPT

## 2018-10-07 PROCEDURE — 94760 N-INVAS EAR/PLS OXIMETRY 1: CPT

## 2018-10-07 PROCEDURE — 83735 ASSAY OF MAGNESIUM: CPT | Performed by: PHYSICIAN ASSISTANT

## 2018-10-07 PROCEDURE — 99291 CRITICAL CARE FIRST HOUR: CPT | Performed by: INTERNAL MEDICINE

## 2018-10-07 PROCEDURE — 94640 AIRWAY INHALATION TREATMENT: CPT

## 2018-10-07 PROCEDURE — 99232 SBSQ HOSP IP/OBS MODERATE 35: CPT | Performed by: INTERNAL MEDICINE

## 2018-10-07 RX ORDER — LORAZEPAM 2 MG/ML
2 INJECTION INTRAMUSCULAR ONCE
Status: COMPLETED | OUTPATIENT
Start: 2018-10-07 | End: 2018-10-07

## 2018-10-07 RX ORDER — PROPOFOL 10 MG/ML
5-50 INJECTION, EMULSION INTRAVENOUS
Status: DISCONTINUED | OUTPATIENT
Start: 2018-10-07 | End: 2018-10-08

## 2018-10-07 RX ORDER — LORAZEPAM 2 MG/ML
INJECTION INTRAMUSCULAR
Status: COMPLETED
Start: 2018-10-07 | End: 2018-10-07

## 2018-10-07 RX ADMIN — FENTANYL CITRATE 25 MCG: 50 INJECTION, SOLUTION INTRAMUSCULAR; INTRAVENOUS at 03:54

## 2018-10-07 RX ADMIN — CHLORHEXIDINE GLUCONATE 0.12% ORAL RINSE 15 ML: 1.2 LIQUID ORAL at 21:39

## 2018-10-07 RX ADMIN — LACOSAMIDE 100 MG: 50 TABLET, FILM COATED ORAL at 08:43

## 2018-10-07 RX ADMIN — HYDROCORTISONE SODIUM SUCCINATE 50 MG: 100 INJECTION, POWDER, FOR SOLUTION INTRAMUSCULAR; INTRAVENOUS at 05:38

## 2018-10-07 RX ADMIN — MUPIROCIN 1 APPLICATION: 20 OINTMENT TOPICAL at 08:44

## 2018-10-07 RX ADMIN — HEPARIN SODIUM 5000 UNITS: 5000 INJECTION, SOLUTION INTRAVENOUS; SUBCUTANEOUS at 05:38

## 2018-10-07 RX ADMIN — METRONIDAZOLE 500 MG: 500 INJECTION, SOLUTION INTRAVENOUS at 05:38

## 2018-10-07 RX ADMIN — Medication 2 G: at 10:30

## 2018-10-07 RX ADMIN — INSULIN LISPRO 1 UNITS: 100 INJECTION, SOLUTION INTRAVENOUS; SUBCUTANEOUS at 17:29

## 2018-10-07 RX ADMIN — INSULIN LISPRO 1 UNITS: 100 INJECTION, SOLUTION INTRAVENOUS; SUBCUTANEOUS at 00:05

## 2018-10-07 RX ADMIN — HYDROCORTISONE SODIUM SUCCINATE 50 MG: 100 INJECTION, POWDER, FOR SOLUTION INTRAMUSCULAR; INTRAVENOUS at 11:33

## 2018-10-07 RX ADMIN — MUPIROCIN 1 APPLICATION: 20 OINTMENT TOPICAL at 21:40

## 2018-10-07 RX ADMIN — CEFEPIME HYDROCHLORIDE 1000 MG: 1 INJECTION, SOLUTION INTRAVENOUS at 13:15

## 2018-10-07 RX ADMIN — METRONIDAZOLE 500 MG: 500 INJECTION, SOLUTION INTRAVENOUS at 13:45

## 2018-10-07 RX ADMIN — ACETYLCYSTEINE 600 MG: 200 SOLUTION ORAL; RESPIRATORY (INHALATION) at 19:00

## 2018-10-07 RX ADMIN — ACETYLCYSTEINE 3 ML: 200 SOLUTION ORAL; RESPIRATORY (INHALATION) at 07:26

## 2018-10-07 RX ADMIN — CHLORHEXIDINE GLUCONATE 0.12% ORAL RINSE 15 ML: 1.2 LIQUID ORAL at 08:42

## 2018-10-07 RX ADMIN — METRONIDAZOLE 500 MG: 500 INJECTION, SOLUTION INTRAVENOUS at 21:40

## 2018-10-07 RX ADMIN — FENTANYL CITRATE 50 MCG: 50 INJECTION, SOLUTION INTRAMUSCULAR; INTRAVENOUS at 03:41

## 2018-10-07 RX ADMIN — IPRATROPIUM BROMIDE AND ALBUTEROL SULFATE 3 ML: .5; 3 SOLUTION RESPIRATORY (INHALATION) at 19:00

## 2018-10-07 RX ADMIN — NOREPINEPHRINE BITARTRATE 4 MCG/MIN: 1 INJECTION INTRAVENOUS at 10:33

## 2018-10-07 RX ADMIN — LACOSAMIDE 100 MG: 50 TABLET, FILM COATED ORAL at 21:40

## 2018-10-07 RX ADMIN — QUETIAPINE 25 MG: 25 TABLET ORAL at 08:47

## 2018-10-07 RX ADMIN — FENTANYL CITRATE 25 MCG: 50 INJECTION, SOLUTION INTRAMUSCULAR; INTRAVENOUS at 08:40

## 2018-10-07 RX ADMIN — FENTANYL CITRATE 25 MCG: 50 INJECTION, SOLUTION INTRAMUSCULAR; INTRAVENOUS at 11:49

## 2018-10-07 RX ADMIN — IPRATROPIUM BROMIDE AND ALBUTEROL SULFATE 3 ML: .5; 3 SOLUTION RESPIRATORY (INHALATION) at 14:29

## 2018-10-07 RX ADMIN — LACTOBACILLUS ACIDOPHILUS / LACTOBACILLUS BULGARICUS 1 PACKET: 100 MILLION CFU STRENGTH GRANULES at 17:02

## 2018-10-07 RX ADMIN — POLYETHYLENE GLYCOL 3350 17 G: 17 POWDER, FOR SOLUTION ORAL at 08:43

## 2018-10-07 RX ADMIN — VASOPRESSIN 0.04 UNITS/MIN: 20 INJECTION INTRAVENOUS at 00:07

## 2018-10-07 RX ADMIN — HEPARIN SODIUM 5000 UNITS: 5000 INJECTION, SOLUTION INTRAVENOUS; SUBCUTANEOUS at 21:39

## 2018-10-07 RX ADMIN — IPRATROPIUM BROMIDE AND ALBUTEROL SULFATE 3 ML: .5; 3 SOLUTION RESPIRATORY (INHALATION) at 07:26

## 2018-10-07 RX ADMIN — HEPARIN SODIUM 5000 UNITS: 5000 INJECTION, SOLUTION INTRAVENOUS; SUBCUTANEOUS at 13:30

## 2018-10-07 RX ADMIN — HYDROCORTISONE SODIUM SUCCINATE 50 MG: 100 INJECTION, POWDER, FOR SOLUTION INTRAMUSCULAR; INTRAVENOUS at 00:04

## 2018-10-07 RX ADMIN — IPRATROPIUM BROMIDE AND ALBUTEROL SULFATE 3 ML: .5; 3 SOLUTION RESPIRATORY (INHALATION) at 02:12

## 2018-10-07 RX ADMIN — FAMOTIDINE 20 MG: 40 POWDER, FOR SUSPENSION ORAL at 08:44

## 2018-10-07 RX ADMIN — LORAZEPAM 2 MG: 2 INJECTION INTRAMUSCULAR at 14:42

## 2018-10-07 RX ADMIN — LORAZEPAM 2 MG: 2 INJECTION INTRAMUSCULAR; INTRAVENOUS at 14:42

## 2018-10-07 RX ADMIN — FENTANYL CITRATE 25 MCG: 50 INJECTION, SOLUTION INTRAMUSCULAR; INTRAVENOUS at 06:30

## 2018-10-07 RX ADMIN — LACTOBACILLUS ACIDOPHILUS / LACTOBACILLUS BULGARICUS 1 PACKET: 100 MILLION CFU STRENGTH GRANULES at 08:43

## 2018-10-07 RX ADMIN — THIAMINE HYDROCHLORIDE 200 MG: 100 INJECTION, SOLUTION INTRAMUSCULAR; INTRAVENOUS at 08:49

## 2018-10-07 RX ADMIN — FENTANYL CITRATE 25 MCG/HR: at 03:57

## 2018-10-07 RX ADMIN — QUETIAPINE 50 MG: 25 TABLET ORAL at 21:40

## 2018-10-07 RX ADMIN — HYDROCORTISONE SODIUM SUCCINATE 50 MG: 100 INJECTION, POWDER, FOR SOLUTION INTRAMUSCULAR; INTRAVENOUS at 17:02

## 2018-10-07 NOTE — PROGRESS NOTES
NEPHROLOGY PROGRESS NOTE    Eamon Machado 52 y o  male MRN: [id number]  Unit/Bed#: ICU 04 Encounter: [id number]  Reason for Consult:  Acute renal failure    Patient remains intubated on pressor support with Levophed  Oxygenation stable  ASSESSMENT/PLAN:  1  Renal     The patient has acute renal failure due to ATN  He is nonoliguric but creatinine continues to slowly rise  He has developed respiratory failure so fluids were discontinued  For now continue supportive care and hopefully we will see creatinine begin to plateau and improve as enters recovery phase and remained stable  Of note he is still on pressor support  No indication for dialysis at the present time  Intermittent diuretics as needed  2  Sepsis  Multifactorial with gram-negative bacteremia potential pulmonary infection and also urinary tract infection with a different bacteria than what grew in his blood cultures  On treatment with antibiotics  3  Respiratory failure  Has pneumonia  Continue antibiotics and supportive care  SUBJECTIVE:  ROS  Patient unable to give any review of systems due to clinical state  OBJECTIVE:  Current Weight: Weight - Scale: 69 kg (152 lb 1 9 oz)  Vitals:Temp (24hrs), Av 2 °F (36 2 °C), Min:96 8 °F (36 °C), Max:98 1 °F (36 7 °C)  Current: Temperature: 98 1 °F (36 7 °C)   Blood pressure 90/53, pulse 90, temperature 98 1 °F (36 7 °C), temperature source Temporal, resp  rate 13, height 5' 6" (1 676 m), weight 69 kg (152 lb 1 9 oz), SpO2 96 %  , Body mass index is 24 55 kg/m²  Intake/Output Summary (Last 24 hours) at 10/07/18 0944  Last data filed at 10/07/18 0921   Gross per 24 hour   Intake          2858 65 ml   Output             1080 ml   Net          1778 65 ml       Physical Exam: BP 90/53   Pulse 90   Temp 98 1 °F (36 7 °C) (Temporal)   Resp 13   Ht 5' 6" (1 676 m)   Wt 69 kg (152 lb 1 9 oz)   SpO2 96%   BMI 24 55 kg/m²   Physical Exam   Constitutional: No distress  HENT:   Intubated  Neck: No JVD present  Cardiovascular: Normal rate  Exam reveals no friction rub  No edema  Pulmonary/Chest: No respiratory distress  He has no rales  Abdominal: Soft  Bowel sounds are normal  He exhibits no distension         Medications:    Current Facility-Administered Medications:     acetylcysteine (MUCOMYST) 200 mg/mL inhalation solution 600 mg, 3 mL, Nebulization, Q12H, Jenise Osuna MD, 3 mL at 10/07/18 0726    bisacodyl (DULCOLAX) rectal suppository 10 mg, 10 mg, Rectal, Daily PRN, Kellie Brian MD    cefepime (MAXIPIME) IVPB (premix) 1,000 mg, 1,000 mg, Intravenous, Q24H, Kellie Brian MD, Stopped at 10/06/18 1430    chlorhexidine (PERIDEX) 0 12 % oral rinse 15 mL, 15 mL, Swish & Spit, Q12H [facility], 15 mL at 10/07/18 0854    famotidine (PEPCID) oral suspension 20 mg, 20 mg, Per G Tube, Daily, [facility], 20 mg at 10/07/18 0844    fentaNYL (SUBLIMAZE) injection 50 mcg, 50 mcg, Intravenous, Q2H PRN, [facility], 50 mcg at 10/07/18 0341    fentaNYL 1250 mcg in sodium chloride 0 9% 125mL drip, 25 mcg/hr, Intravenous, Continuous, REMEDIOS Jaimes, Last Rate: 2 5 mL/hr at 10/07/18 0357, 25 mcg/hr at 10/07/18 0357    fentanyl citrate (PF) 100 MCG/2ML 25 mcg, 25 mcg, Intravenous, Q2H PRN, [facility] [place], 25 mcg at 10/07/18 0840    heparin (porcine) subcutaneous injection 5,000 Units, 5,000 Units, Subcutaneous, Q8H [facility], Kellie Brian MD, 5,000 Units at 10/07/18 0538    hydrocortisone sodium succinate (PF) (Solu-CORTEF) injection 50 mg, 50 mg, Intravenous, Q6H [facility], Mara Gonzalez PA-C, 50 mg at 10/07/18 0538    insulin lispro (HumaLOG) 100 units/mL subcutaneous injection 1-5 Units, 1-5 Units, Subcutaneous, Q6H [facility], 1 Units at 10/07/18 0005 **AND** Fingerstick Glucose (POCT), , , Q6H, REMEDIOS Jaimes    ipratropium-albuterol (DUO-NEB) 0 5-2 5 mg/3 mL inhalation solution 3 mL, 3 mL, Nebulization, Q6H, Kimberly Anoop Garcia MD, 3 mL at 10/07/18 0726    lacosamide (VIMPAT) tablet 100 mg, 100 mg, Per G Tube, Q12H [facility], Janae Henry MD, 100 mg at 10/07/18 0843    lactobacillus acidophilus-bulgaricus [facility]) packet 1 packet, 1 packet, Oral, BID, Janae Henry MD, 1 packet at 10/07/18 0843    metroNIDAZOLE (FLAGYL) IVPB (premix) 500 mg, 500 mg, Intravenous, Q8H, Neetu Melvin PA-C, Stopped at 10/07/18 0608    mupirocin (BACTROBAN) 2 % nasal ointment, , Nasal, Q12H [facility], Janae Henry MD, 1 application at 95/19/76 0844    norepinephrine (LEVOPHED) 8 mg (DOUBLE CONCENTRATION) IV in sodium chloride 0 9% 250 mL, 1-30 mcg/min, Intravenous, Titrated, Magui Ball PA-C, Last Rate: 9 4 mL/hr at 10/07/18 0921, 5 mcg/min at 10/07/18 0921    polyethylene glycol (MIRALAX) packet 17 g, 17 g, Per G Tube, Daily, Janae Henry MD, 17 g at 10/07/18 0843    QUEtiapine (SEROquel) tablet 25 mg, 25 mg, Per G Tube, QAM, Janae Henry MD, 25 mg at 10/07/18 0847    QUEtiapine (SEROquel) tablet 50 mg, 50 mg, Per G Tube, HSJanae MD, 50 mg at 10/06/18 2177    thiamine (VITAMIN B1) 200 mg in sodium chloride 0 9 % 50 mL IVPB, 200 mg, Intravenous, Daily, Magui Ball PA-C, Stopped at 10/07/18 0402    vasopressin (PITRESSIN) 20 Units in sodium chloride 0 9 % 100 mL infusion, 0 04 Units/min, Intravenous, Continuous, Magui Ball PA-C, Stopped at 10/07/18 4949    Laboratory Results:  Lab Results   Component Value Date    WBC 16 74 (H) 10/07/2018    HGB 7 5 (L) 10/07/2018    HCT 22 7 (L) 10/07/2018     (H) 10/07/2018    PLT 80 (L) 10/07/2018     Lab Results   Component Value Date    CALCIUM 8 2 (L) 10/07/2018     10/07/2018    K 4 0 10/07/2018    CO2 25 10/07/2018     10/07/2018    BUN 73 (H) 10/07/2018    CREATININE 3 40 (H) 10/07/2018     Lab Results   Component Value Date    CALCIUM 8 2 (L) 10/07/2018    PHOS 5 8 (H) 10/07/2018     No results found for: LABPROT

## 2018-10-07 NOTE — PLAN OF CARE
Problem: Potential for Falls  Goal: Patient will remain free of falls  INTERVENTIONS:  - Assess patient frequently for physical needs  -  Identify cognitive and physical deficits and behaviors that affect risk of falls    -  [place] fall precautions as indicated by assessment   - Educate patient/family on patient safety including physical limitations  - Instruct patient to call for assistance with activity based on assessment  - Modify environment to reduce risk of injury  - Consider OT/PT consult to assist with strengthening/mobility   Outcome: Progressing      Problem: PAIN - ADULT  Goal: Verbalizes/displays adequate comfort level or baseline comfort level  Interventions:  - Encourage patient to monitor pain and request assistance  - Assess pain using appropriate pain scale  - Administer analgesics based on type and severity of pain and evaluate response  - Implement non-pharmacological measures as appropriate and evaluate response  - Consider cultural and social influences on pain and pain management  - Notify physician/advanced practitioner if interventions unsuccessful or patient reports new pain   Outcome: Progressing      Problem: INFECTION - ADULT  Goal: Absence or prevention of progression during hospitalization  INTERVENTIONS:  - Assess and monitor for signs and symptoms of infection  - Monitor lab/diagnostic results  - Monitor all insertion sites, i e  indwelling lines, tubes, and drains  - Monitor endotracheal (as able) and nasal secretions for changes in amount and color  - [place] appropriate cooling/warming therapies per order  - Administer medications as ordered  - Instruct and encourage patient and family to use good hand hygiene technique  - Identify and instruct in appropriate isolation precautions for identified infection/condition   Outcome: Progressing    Goal: Absence of fever/infection during neutropenic period  INTERVENTIONS:  - Monitor WBC  - Implement neutropenic guidelines   Outcome: Progressing      Problem: SAFETY ADULT  Goal: Maintain or return to baseline ADL function  INTERVENTIONS:  -  Assess patient's ability to carry out ADLs; assess patient's baseline for ADL function and identify physical deficits which impact ability to perform ADLs (bathing, care of mouth/teeth, toileting, grooming, dressing, etc )  - Assess/evaluate cause of self-care deficits   - Assess range of motion  - Assess patient's mobility; develop plan if impaired  - Assess patient's need for assistive devices and provide as appropriate  - Encourage maximum independence but intervene and supervise when necessary  ¯ Involve family in performance of ADLs  ¯ Assess for home care needs following discharge   ¯ Request OT consult to assist with ADL evaluation and planning for discharge  ¯ Provide patient education as appropriate   Outcome: Progressing    Goal: Maintain or return mobility status to optimal level  INTERVENTIONS:  - Assess patient's baseline mobility status (ambulation, transfers, stairs, etc )    - Identify cognitive and physical deficits and behaviors that affect mobility  - Identify mobility aids required to assist with transfers and/or ambulation (gait belt, sit-to-stand, lift, walker, cane, etc )  - [place] fall precautions as indicated by assessment  - Record patient progress and toleration of activity level on Mobility SBAR; progress patient to next Phase/Stage  - Instruct patient to call for assistance with activity based on assessment  - Request Rehabilitation consult to assist with strengthening/weightbearing, etc    Outcome: Progressing      Problem: DISCHARGE PLANNING  Goal: Discharge to home or other facility with appropriate resources  INTERVENTIONS:  - Identify barriers to discharge w/patient and caregiver  - Arrange for needed discharge resources and transportation as appropriate  - Identify discharge learning needs (meds, wound care, etc )  - Arrange for interpretive services to assist at discharge as needed  - Refer to Case Management Department for coordinating discharge planning if the patient needs post-hospital services based on physician/advanced practitioner order or complex needs related to functional status, cognitive ability, or social support system   Outcome: Progressing      Problem: Knowledge Deficit  Goal: Patient/family/caregiver demonstrates understanding of disease process, treatment plan, medications, and discharge instructions  Complete learning assessment and assess knowledge base  Interventions:  - Provide teaching at level of understanding  - Provide teaching via preferred learning methods   Outcome: Progressing      Problem: CARDIOVASCULAR - ADULT  Goal: Maintains optimal cardiac output and hemodynamic stability  INTERVENTIONS:  - Monitor I/O, vital signs and rhythm  - Monitor for S/S and trends of decreased cardiac output i e  bleeding, hypotension  - Administer and titrate ordered vasoactive medications to optimize hemodynamic stability  - Assess quality of pulses, skin color and temperature  - Assess for signs of decreased coronary artery perfusion - ex   Angina  - Instruct patient to report change in severity of symptoms   Outcome: Progressing      Problem: METABOLIC, FLUID AND ELECTROLYTES - ADULT  Goal: Electrolytes maintained within normal limits  INTERVENTIONS:  - Monitor labs and assess patient for signs and symptoms of electrolyte imbalances  - Administer electrolyte replacement as ordered  - Monitor response to electrolyte replacements, including repeat lab results as appropriate  - Instruct patient on fluid and nutrition as appropriate   Outcome: Progressing    Goal: Fluid balance maintained  INTERVENTIONS:  - Monitor labs and assess for signs and symptoms of volume excess or deficit  - Monitor I/O and WT  - Instruct patient on fluid and nutrition as appropriate   Outcome: Progressing      Problem: SKIN/TISSUE INTEGRITY - ADULT  Goal: Skin integrity remains intact  INTERVENTIONS  - Identify patients at risk for skin breakdown  - Assess and monitor skin integrity  - Assess and monitor nutrition and hydration status  - Monitor labs (i e  albumin)  - Assess for incontinence   - Turn and reposition patient  - Assist with mobility/ambulation  - Relieve pressure over bony prominences  - Avoid friction and shearing  - Provide appropriate hygiene as needed including keeping skin clean and dry  - Evaluate need for skin moisturizer/barrier cream  - Collaborate with interdisciplinary team (i e  Nutrition, Rehabilitation, etc )   - Patient/family teaching   Outcome: Progressing    Goal: Incision(s), wounds(s) or drain site(s) healing without S/S of infection  INTERVENTIONS  - Assess and document risk factors for skin impairment   - Assess and document dressing, incision, wound bed, drain sites and surrounding tissue  - Initiate Nutrition services consult and/or wound management as needed   Outcome: Progressing    Goal: Oral mucous membranes remain intact  INTERVENTIONS  - Assess oral mucosa and hygiene practices  - Implement preventative oral hygiene regimen  - Implement oral medicated treatments as ordered  - Initiate Nutrition services referral as needed   Outcome: Progressing      Problem: SAFETY,RESTRAINT: NV/NON-SELF DESTRUCTIVE BEHAVIOR  Goal: Remains free of harm/injury (restraint for non violent/non self-detsructive behavior)  INTERVENTIONS:  - Instruct patient/family regarding restraint use   - Assess and monitor physiologic and psychological status   - Provide interventions and comfort measures to meet assessed patient needs   - Identify and implement measures to help patient regain control  - Assess readiness for release of restraint    Outcome: Progressing    Goal: Returns to optimal restraint-free functioning  INTERVENTIONS:  - Assess the patient's behavior and symptoms that indicate continued need for restraint  - Identify and implement measures to help patient regain control  - Assess readiness for release of restraint    Outcome: Progressing      Problem: Prexisting or High Potential for Compromised Skin Integrity  Goal: Skin integrity is maintained or improved  INTERVENTIONS:  - Identify patients at risk for skin breakdown  - Assess and monitor skin integrity  - Assess and monitor nutrition and hydration status  - Monitor labs (i e  albumin)  - Assess for incontinence   - Turn and reposition patient  - Assist with mobility/ambulation  - Relieve pressure over bony prominences  - Avoid friction and shearing  - Provide appropriate hygiene as needed including keeping skin clean and dry  - Evaluate need for skin moisturizer/barrier cream  - Collaborate with interdisciplinary team (i e  Nutrition, Rehabilitation, etc )   - Patient/family teaching   Outcome: Progressing      Problem: Nutrition/Hydration-ADULT  Goal: Nutrient/Hydration intake appropriate for improving, restoring or maintaining nutritional needs  Monitor and assess patient's nutrition/hydration status for malnutrition (ex- brittle hair, bruises, dry skin, pale skin and conjunctiva, muscle wasting, smooth red tongue, and disorientation)  Collaborate with interdisciplinary team and initiate plan and interventions as ordered  Monitor patient's weight and dietary intake as ordered or per policy  Utilize nutrition screening tool and intervene per policy  Determine patient's food preferences and provide high-protein, high-caloric foods as appropriate       INTERVENTIONS:  - Monitor intake, urinary output, labs, and treatment plans  - Assess nutrition and hydration status and recommend course of action  - Recommend/encourage appropriate nutritional supplements, and vitamin/mineral supplements  - Order, calculate, and assess calorie counts as needed  - Recommend, monitor, and adjust tube feedings based on assessed needs  - Assess need for intravenous fluids  - Provide specific nutrition/hydration education as appropriate  - Include patient/family/caregiver in decisions related to nutrition    Outcome: Progressing      Problem: DISCHARGE PLANNING - CARE MANAGEMENT  Goal: Discharge to post-acute care or home with appropriate resources  INTERVENTIONS:  - Conduct assessment to determine patient/family and health care team treatment goals, and need for post-acute services based on payer coverage, community resources, and patient preferences, and barriers to discharge  - Address psychosocial, clinical, and financial barriers to discharge as identified in assessment in conjunction with the patient/family and health care team  - Arrange appropriate level of post-acute services according to patient's   needs and preference and payer coverage in collaboration with the physician and health care team  - Communicate with and update the patient/family, physician, and health care team regarding progress on the discharge plan  - Arrange appropriate transportation to post-acute venues  Return to [facility] when medically stable      Outcome: Progressing      Problem: RESPIRATORY - ADULT  Goal: Achieves optimal ventilation and oxygenation  INTERVENTIONS:  - Assess for changes in respiratory status  - Assess for changes in mentation and behavior  - Position to facilitate oxygenation and minimize respiratory effort  - Oxygen administration by appropriate delivery method based on oxygen saturation (per order) or ABGs    - Encourage broncho-pulmonary hygiene including cough, deep breathe, Incentive Spirometry  - Assess the need for suctioning and aspirate as needed  - Assess and instruct to report SOB or any respiratory difficulty  - Respiratory Therapy support as indicated  Nebulizer therapy as ordered   Outcome: Progressing

## 2018-10-08 ENCOUNTER — APPOINTMENT (INPATIENT)
Dept: RADIOLOGY | Facility: HOSPITAL | Age: 53
DRG: 871 | End: 2018-10-08
Payer: MEDICARE

## 2018-10-08 PROBLEM — N17.0 ATN (ACUTE TUBULAR NECROSIS) (HCC): Status: ACTIVE | Noted: 2018-10-08

## 2018-10-08 PROBLEM — G92.8 TOXIC METABOLIC ENCEPHALOPATHY: Status: ACTIVE | Noted: 2018-10-08

## 2018-10-08 PROBLEM — R78.81 GRAM-NEGATIVE BACTEREMIA: Status: ACTIVE | Noted: 2018-10-08

## 2018-10-08 LAB
ALBUMIN SERPL BCP-MCNC: 1.5 G/DL (ref 3.5–5)
ALP SERPL-CCNC: 121 U/L (ref 46–116)
ALT SERPL W P-5'-P-CCNC: 16 U/L (ref 12–78)
ANION GAP SERPL CALCULATED.3IONS-SCNC: 10 MMOL/L (ref 4–13)
ANISOCYTOSIS BLD QL SMEAR: PRESENT
AST SERPL W P-5'-P-CCNC: 16 U/L (ref 5–45)
BASOPHILS # BLD MANUAL: 0 THOUSAND/UL (ref 0–0.1)
BASOPHILS NFR MAR MANUAL: 0 % (ref 0–1)
BILIRUB SERPL-MCNC: 0.3 MG/DL (ref 0.2–1)
BUN SERPL-MCNC: 85 MG/DL (ref 5–25)
CA-I BLD-SCNC: 1.11 MMOL/L (ref 1.12–1.32)
CALCIUM SERPL-MCNC: 8 MG/DL (ref 8.3–10.1)
CHLORIDE SERPL-SCNC: 106 MMOL/L (ref 100–108)
CO2 SERPL-SCNC: 25 MMOL/L (ref 21–32)
CREAT SERPL-MCNC: 3.35 MG/DL (ref 0.6–1.3)
EOSINOPHIL # BLD MANUAL: 0 THOUSAND/UL (ref 0–0.4)
EOSINOPHIL NFR BLD MANUAL: 0 % (ref 0–6)
ERYTHROCYTE [DISTWIDTH] IN BLOOD BY AUTOMATED COUNT: 17.5 % (ref 11.6–15.1)
GFR SERPL CREATININE-BSD FRML MDRD: 20 ML/MIN/1.73SQ M
GLUCOSE SERPL-MCNC: 131 MG/DL (ref 65–140)
GLUCOSE SERPL-MCNC: 140 MG/DL (ref 65–140)
GLUCOSE SERPL-MCNC: 142 MG/DL (ref 65–140)
GLUCOSE SERPL-MCNC: 172 MG/DL (ref 65–140)
HCT VFR BLD AUTO: 24.1 % (ref 36.5–49.3)
HEMOCCULT STL QL: NEGATIVE
HGB BLD-MCNC: 7.7 G/DL (ref 12–17)
LYMPHOCYTES # BLD AUTO: 1.2 THOUSAND/UL (ref 0.6–4.47)
LYMPHOCYTES # BLD AUTO: 7 % (ref 14–44)
MACROCYTES BLD QL AUTO: PRESENT
MAGNESIUM SERPL-MCNC: 2.2 MG/DL (ref 1.6–2.6)
MCH RBC QN AUTO: 33 PG (ref 26.8–34.3)
MCHC RBC AUTO-ENTMCNC: 32 G/DL (ref 31.4–37.4)
MCV RBC AUTO: 103 FL (ref 82–98)
MONOCYTES # BLD AUTO: 1.37 THOUSAND/UL (ref 0–1.22)
MONOCYTES NFR BLD: 8 % (ref 4–12)
NEUTROPHILS # BLD MANUAL: 14.57 THOUSAND/UL (ref 1.85–7.62)
NEUTS BAND NFR BLD MANUAL: 24 % (ref 0–8)
NEUTS SEG NFR BLD AUTO: 61 % (ref 43–75)
NRBC BLD AUTO-RTO: 1 /100 WBCS
PHOSPHATE SERPL-MCNC: 6.4 MG/DL (ref 2.7–4.5)
PLATELET # BLD AUTO: 88 THOUSANDS/UL (ref 149–390)
PLATELET BLD QL SMEAR: ABNORMAL
PMV BLD AUTO: 11.7 FL (ref 8.9–12.7)
POLYCHROMASIA BLD QL SMEAR: PRESENT
POTASSIUM SERPL-SCNC: 4.6 MMOL/L (ref 3.5–5.3)
PROCALCITONIN SERPL-MCNC: 1.69 NG/ML
PROT SERPL-MCNC: 6.1 G/DL (ref 6.4–8.2)
RBC # BLD AUTO: 2.33 MILLION/UL (ref 3.88–5.62)
RBC MORPH BLD: PRESENT
SODIUM SERPL-SCNC: 141 MMOL/L (ref 136–145)
TARGETS BLD QL SMEAR: PRESENT
TOTAL CELLS COUNTED SPEC: 100
WBC # BLD AUTO: 17.14 THOUSAND/UL (ref 4.31–10.16)

## 2018-10-08 PROCEDURE — 82330 ASSAY OF CALCIUM: CPT | Performed by: PHYSICIAN ASSISTANT

## 2018-10-08 PROCEDURE — 82948 REAGENT STRIP/BLOOD GLUCOSE: CPT

## 2018-10-08 PROCEDURE — 99232 SBSQ HOSP IP/OBS MODERATE 35: CPT | Performed by: INTERNAL MEDICINE

## 2018-10-08 PROCEDURE — 94760 N-INVAS EAR/PLS OXIMETRY 1: CPT

## 2018-10-08 PROCEDURE — 83735 ASSAY OF MAGNESIUM: CPT | Performed by: PHYSICIAN ASSISTANT

## 2018-10-08 PROCEDURE — 84145 PROCALCITONIN (PCT): CPT | Performed by: PHYSICIAN ASSISTANT

## 2018-10-08 PROCEDURE — 99291 CRITICAL CARE FIRST HOUR: CPT | Performed by: ANESTHESIOLOGY

## 2018-10-08 PROCEDURE — 80053 COMPREHEN METABOLIC PANEL: CPT | Performed by: PHYSICIAN ASSISTANT

## 2018-10-08 PROCEDURE — 94003 VENT MGMT INPAT SUBQ DAY: CPT

## 2018-10-08 PROCEDURE — 85007 BL SMEAR W/DIFF WBC COUNT: CPT | Performed by: PHYSICIAN ASSISTANT

## 2018-10-08 PROCEDURE — 85027 COMPLETE CBC AUTOMATED: CPT | Performed by: PHYSICIAN ASSISTANT

## 2018-10-08 PROCEDURE — 94640 AIRWAY INHALATION TREATMENT: CPT

## 2018-10-08 PROCEDURE — 71045 X-RAY EXAM CHEST 1 VIEW: CPT

## 2018-10-08 PROCEDURE — 84100 ASSAY OF PHOSPHORUS: CPT | Performed by: PHYSICIAN ASSISTANT

## 2018-10-08 RX ORDER — PROPOFOL 10 MG/ML
5-50 INJECTION, EMULSION INTRAVENOUS
Status: DISCONTINUED | OUTPATIENT
Start: 2018-10-08 | End: 2018-10-09

## 2018-10-08 RX ORDER — ACETYLCYSTEINE 200 MG/ML
3 SOLUTION ORAL; RESPIRATORY (INHALATION)
Status: DISCONTINUED | OUTPATIENT
Start: 2018-10-08 | End: 2018-10-09

## 2018-10-08 RX ADMIN — INSULIN LISPRO 1 UNITS: 100 INJECTION, SOLUTION INTRAVENOUS; SUBCUTANEOUS at 23:58

## 2018-10-08 RX ADMIN — METRONIDAZOLE 500 MG: 500 INJECTION, SOLUTION INTRAVENOUS at 05:44

## 2018-10-08 RX ADMIN — IPRATROPIUM BROMIDE AND ALBUTEROL SULFATE 3 ML: .5; 3 SOLUTION RESPIRATORY (INHALATION) at 01:54

## 2018-10-08 RX ADMIN — HEPARIN SODIUM 5000 UNITS: 5000 INJECTION, SOLUTION INTRAVENOUS; SUBCUTANEOUS at 13:30

## 2018-10-08 RX ADMIN — FENTANYL CITRATE 25 MCG: 50 INJECTION, SOLUTION INTRAMUSCULAR; INTRAVENOUS at 20:25

## 2018-10-08 RX ADMIN — POLYETHYLENE GLYCOL 3350 17 G: 17 POWDER, FOR SOLUTION ORAL at 08:22

## 2018-10-08 RX ADMIN — HYDROCORTISONE SODIUM SUCCINATE 50 MG: 100 INJECTION, POWDER, FOR SOLUTION INTRAMUSCULAR; INTRAVENOUS at 05:44

## 2018-10-08 RX ADMIN — FENTANYL CITRATE 25 MCG: 50 INJECTION, SOLUTION INTRAMUSCULAR; INTRAVENOUS at 08:22

## 2018-10-08 RX ADMIN — LACOSAMIDE 100 MG: 50 TABLET, FILM COATED ORAL at 08:22

## 2018-10-08 RX ADMIN — ACETYLCYSTEINE 800 MG: 200 INHALANT RESPIRATORY (INHALATION) at 13:22

## 2018-10-08 RX ADMIN — HEPARIN SODIUM 5000 UNITS: 5000 INJECTION, SOLUTION INTRAVENOUS; SUBCUTANEOUS at 05:44

## 2018-10-08 RX ADMIN — IPRATROPIUM BROMIDE AND ALBUTEROL SULFATE 3 ML: .5; 3 SOLUTION RESPIRATORY (INHALATION) at 19:11

## 2018-10-08 RX ADMIN — HEPARIN SODIUM 5000 UNITS: 5000 INJECTION, SOLUTION INTRAVENOUS; SUBCUTANEOUS at 21:26

## 2018-10-08 RX ADMIN — METRONIDAZOLE 500 MG: 500 INJECTION, SOLUTION INTRAVENOUS at 21:27

## 2018-10-08 RX ADMIN — INSULIN LISPRO 1 UNITS: 100 INJECTION, SOLUTION INTRAVENOUS; SUBCUTANEOUS at 00:32

## 2018-10-08 RX ADMIN — CEFEPIME HYDROCHLORIDE 1000 MG: 1 INJECTION, SOLUTION INTRAVENOUS at 13:15

## 2018-10-08 RX ADMIN — IPRATROPIUM BROMIDE AND ALBUTEROL SULFATE 3 ML: .5; 3 SOLUTION RESPIRATORY (INHALATION) at 07:30

## 2018-10-08 RX ADMIN — PROPOFOL 5 MCG/KG/MIN: 10 INJECTION, EMULSION INTRAVENOUS at 13:33

## 2018-10-08 RX ADMIN — CHLORHEXIDINE GLUCONATE 0.12% ORAL RINSE 15 ML: 1.2 LIQUID ORAL at 08:22

## 2018-10-08 RX ADMIN — IPRATROPIUM BROMIDE AND ALBUTEROL SULFATE 3 ML: .5; 3 SOLUTION RESPIRATORY (INHALATION) at 13:22

## 2018-10-08 RX ADMIN — HYDROCORTISONE SODIUM SUCCINATE 50 MG: 100 INJECTION, POWDER, FOR SOLUTION INTRAMUSCULAR; INTRAVENOUS at 00:29

## 2018-10-08 RX ADMIN — HYDROCORTISONE SODIUM SUCCINATE 50 MG: 100 INJECTION, POWDER, FOR SOLUTION INTRAMUSCULAR; INTRAVENOUS at 23:57

## 2018-10-08 RX ADMIN — FAMOTIDINE 20 MG: 40 POWDER, FOR SUSPENSION ORAL at 08:24

## 2018-10-08 RX ADMIN — HYDROCORTISONE SODIUM SUCCINATE 50 MG: 100 INJECTION, POWDER, FOR SOLUTION INTRAMUSCULAR; INTRAVENOUS at 17:48

## 2018-10-08 RX ADMIN — QUETIAPINE 50 MG: 25 TABLET ORAL at 21:26

## 2018-10-08 RX ADMIN — LACOSAMIDE 100 MG: 50 TABLET, FILM COATED ORAL at 21:27

## 2018-10-08 RX ADMIN — MUPIROCIN 1 APPLICATION: 20 OINTMENT TOPICAL at 08:24

## 2018-10-08 RX ADMIN — LACTOBACILLUS ACIDOPHILUS / LACTOBACILLUS BULGARICUS 1 PACKET: 100 MILLION CFU STRENGTH GRANULES at 08:22

## 2018-10-08 RX ADMIN — ACETYLCYSTEINE 600 MG: 200 INHALANT RESPIRATORY (INHALATION) at 19:11

## 2018-10-08 RX ADMIN — ACETYLCYSTEINE 800 MG: 200 SOLUTION ORAL; RESPIRATORY (INHALATION) at 07:31

## 2018-10-08 RX ADMIN — MUPIROCIN 1 APPLICATION: 20 OINTMENT TOPICAL at 21:26

## 2018-10-08 RX ADMIN — QUETIAPINE 25 MG: 25 TABLET ORAL at 08:22

## 2018-10-08 RX ADMIN — LACTOBACILLUS ACIDOPHILUS / LACTOBACILLUS BULGARICUS 1 PACKET: 100 MILLION CFU STRENGTH GRANULES at 17:49

## 2018-10-08 RX ADMIN — METRONIDAZOLE 500 MG: 500 INJECTION, SOLUTION INTRAVENOUS at 13:45

## 2018-10-08 RX ADMIN — CHLORHEXIDINE GLUCONATE 0.12% ORAL RINSE 15 ML: 1.2 LIQUID ORAL at 20:41

## 2018-10-08 RX ADMIN — HYDROCORTISONE SODIUM SUCCINATE 50 MG: 100 INJECTION, POWDER, FOR SOLUTION INTRAMUSCULAR; INTRAVENOUS at 13:33

## 2018-10-08 NOTE — PROGRESS NOTES
Progress Note - Nephrology   Dari Huang 52 y o  male MRN: [id number]  Unit/Bed#: ICU 04 Encounter: [id number]    Assessment:  1  Acute renal failure/acute kidney injury secondary to acute tubular necrosis  In 2017 his baseline creatinine had been 0 9-1 2  On admission on October 2nd his creatinine was 2 7  For the past 3 days, his creatinine has been 3 35-3 40 range  Today his creatinine is 3 35  His acute kidney injury secondary to ATN/septic shock  The patient is nonoliguric  I think that this is his plateau phase of ATN  This septic shock is secondary to Proteus mirabilis pneumonia with bacteremia  The patient also has a suprapubic catheter in polymicrobial urinary tract infection    2  Fluid management:  Patient does not examine to be fluid overloaded, he does not have leg edema is on 40% FiO2  Pressor requirements per critical care team   His electrolytes are stable with a sodium of 141, potassium 4 6  His corrected calcium with a calcium of 8 now albumin of 1 5 is approximately 10 0  His ionized calcium is 1 1 continue to follow  Magnesium is stable at 2 2    Plan:  As above, monitor kidney function at this point  Likely plateau phase of ATN        Subjective:   Patient seen in follow-up this a m  For acute renal failure and fluid management  Patient is currently intubated  He is on low-dose pressor  He is on tube feeds with free water flushes 150 cc every 4 hours  Systolic blood pressure has been 862440 systolic over the past 24 hours  The patient is nonoliguric urine output 1 L total input 2 7 L  He is on 40% FiO2     Objective:     Vitals: Blood pressure 99/58, pulse 89, temperature 97 7 °F (36 5 °C), temperature source Temporal, resp  rate 16, height 5' 6" (1 676 m), weight 71 kg (156 lb 8 4 oz), SpO2 98 %  ,Body mass index is 25 26 kg/m²      Weight (last 2 days)     Date/Time   Weight    10/08/18 0600  71 (156 53)    10/07/18 0600  69 (152 12)    10/06/18 0600  67 7 (149 25) Intake/Output Summary (Last 24 hours) at 10/08/18 0704  Last data filed at 10/08/18 0614   Gross per 24 hour   Intake          2752 96 ml   Output             1095 ml   Net          1657 96 ml       Urethral Catheter 18 Fr  (Active)       Physical Exam: General: patient is currently intubated in no acute distress  Skin:  There is no new rash  Eyes:  No scleral icterus noted  The patient does have microcephaly    Neck:  Supple no adenopathy  Chest:  Coarse breath sounds  CVS:  S1-S2  Abdomen:  Positive bowel sounds  Extremities:  No significant edema noted  Neuro:  Patient is sedated and ventilated  Psych:  Patient is sedated and ventilated              Prescriptions Prior to Admission   Medication    bisacodyl (DULCOLAX) 10 mg suppository    Docusate Sodium 50 MG/15ML LIQD    famotidine (PEPCID) 40 mg/5 mL suspension    ketoconazole (NIZORAL) 2 % cream    lacosamide (VIMPAT) 50 mg    lactobacillus acidophilus-bulgaricus (FLORANEX) tablet    Melatonin 5 MG TABS    Mouthwashes (BIOTENE DRY MOUTH) LIQD    Nutritional Supplements (NUTREN 1 5 PO)    polyethylene glycol (MIRALAX) 17 g packet    QUEtiapine (SEROquel) 25 mg tablet    QUEtiapine (SEROquel) 50 mg tablet    silver sulfadiazine (SILVADENE,SSD) 1 % cream       Current Facility-Administered Medications   Medication Dose Route Frequency    acetylcysteine (MUCOMYST) 200 mg/mL inhalation solution 600 mg  3 mL Nebulization Q12H    bisacodyl (DULCOLAX) rectal suppository 10 mg  10 mg Rectal Daily PRN    cefepime (MAXIPIME) IVPB (premix) 1,000 mg  1,000 mg Intravenous Q24H    chlorhexidine (PERIDEX) 0 12 % oral rinse 15 mL  15 mL Swish & Spit Q12H [address]    famotidine (PEPCID) oral suspension 20 mg  20 mg Per G Tube Daily    fentaNYL (SUBLIMAZE) injection 50 mcg  50 mcg Intravenous Q2H PRN    fentanyl citrate (PF) 100 MCG/2ML 25 mcg  25 mcg Intravenous Q2H PRN    heparin (porcine) subcutaneous injection 5,000 Units  5,000 Units Subcutaneous Q8H [address]    hydrocortisone sodium succinate (PF) (Solu-CORTEF) injection 50 mg  50 mg Intravenous Q6H [address]    insulin lispro (HumaLOG) 100 units/mL subcutaneous injection 1-5 Units  1-5 Units Subcutaneous Q6H [address]    ipratropium-albuterol (DUO-NEB) 0 5-2 5 mg/3 mL inhalation solution 3 mL  3 mL Nebulization Q6H    lacosamide (VIMPAT) tablet 100 mg  100 mg Per G Tube Q12H [address]    lactobacillus acidophilus-bulgaricus (FLORANEX) packet 1 packet  1 packet Oral BID    metroNIDAZOLE (FLAGYL) IVPB (premix) 500 mg  500 mg Intravenous Q8H    mupirocin (BACTROBAN) 2 % nasal ointment   Nasal Q12H [address]    norepinephrine (LEVOPHED) 8 mg (DOUBLE CONCENTRATION) IV in sodium chloride 0 9% 250 mL  1-30 mcg/min Intravenous Titrated    polyethylene glycol (MIRALAX) packet 17 g  17 g Per G Tube Daily    propofol (DIPRIVAN) 1000 mg in 100 mL infusion (premix)  5-50 mcg/kg/min Intravenous Titrated    QUEtiapine (SEROquel) tablet 25 mg  25 mg Per G Tube QAM    QUEtiapine (SEROquel) tablet 50 mg  50 mg Per G Tube HS    vasopressin (PITRESSIN) 20 Units in sodium chloride 0 9 % 100 mL infusion  0 04 Units/min Intravenous Continuous        Lab, Imaging and other studies: I have personally reviewed pertinent labs    CBC: Lab Results   Component Value Date    WBC 17 14 (H) 10/08/2018    RBC 2 33 (L) 10/08/2018     CMP: Lab Results   Component Value Date     10/08/2018     10/08/2018    CO2 25 10/08/2018    BUN 85 (H) 10/08/2018    CREATININE 3 35 (H) 10/08/2018    CALCIUM 8 0 (L) 10/08/2018    AST 16 10/08/2018    ALT 16 10/08/2018    ALKPHOS 121 (H) 10/08/2018    EGFR 20 10/08/2018     Phosphorus:   Lab Results   Component Value Date    PHOS 6 4 (H) 10/08/2018     Magnesium:   Lab Results   Component Value Date    MG 2 2 10/08/2018     Urinalysis: Lab Results   Component Value Date    COLORU [place] 10/02/2018    CLARITYU Cloudy 10/02/2018    SPECGRAV 1 010 10/02/2018    PHUR >=9 0 10/02/2018    LEUKOCYTESUR Large (A) 10/02/2018    NITRITE Negative 10/02/2018    PROTEINUA 100 (2+) (A) 10/02/2018    GLUCOSEU Negative 10/02/2018    KETONESU Negative 10/02/2018    BILIRUBINUR Interference- unable to analyze (A) 10/02/2018    BLOODU Large (A) 10/02/2018     BMP: Lab Results   Component Value Date    CO2 25 10/08/2018    BUN 85 (H) 10/08/2018    CREATININE 3 35 (H) 10/08/2018    CALCIUM 8 0 (L) 10/08/2018

## 2018-10-08 NOTE — PLAN OF CARE
Problem: RESPIRATORY - ADULT  Goal: Achieves optimal ventilation and oxygenation  INTERVENTIONS:  - Assess for changes in respiratory status  - Assess for changes in mentation and behavior  - Position to facilitate oxygenation and minimize respiratory effort  - Oxygen administration by appropriate delivery method based on oxygen saturation (per order) or ABGs    - Encourage broncho-pulmonary hygiene including cough, deep breathe, Incentive Spirometry  - Assess the need for suctioning and aspirate as needed  - Assess and instruct to report SOB or any respiratory difficulty  - Respiratory Therapy support as indicated  Nebulizer therapy as ordered   CPT by VEST as ordered and suction   Outcome: Progressing

## 2018-10-08 NOTE — CASE MANAGEMENT
Continued Stay Review  Date: 10/6/18  Vitals:     10/06/18 1239 10/06/18 1300 10/06/18 1319 10/06/18 1330   BP:   112/57 112/50 (!) 109/48   Pulse:   74 68 69   Resp:   20 13 15   Temp:     (!) 97 2 °F (36 2 °C) (!) 97 3 °F (36 3 °C)   TempSrc:       Temporal   SpO2: 96% 97%   98%   Weight:           Height:                 Arterial Line BP: 110/47  Arterial Line MAP (mmHg): 69 mmHg     Temperature:   Temp (24hrs), Av °F (36 1 °C), Min:96 7 °F (35 9 °C), Max:97 3 °F (36 3 °C)  Medications:   Scheduled Meds:   Current Facility-Administered Medications:  acetylcysteine 3 mL Nebulization Q6H While awake Chen Cook MD    bisacodyl 10 mg Rectal Daily PRN Annamaria Carr MD    cefepime 1,000 mg Intravenous Q24H Annamaria Carr MD Last Rate: Stopped (10/07/18 1345)   chlorhexidine 15 mL Swish & Spit Q12H [address] Emery Arias PA-C    famotidine 20 mg Per G Tube Daily Mara Gonzalez PA-C    fentanyl citrate (PF) 25 mcg Intravenous Q2H PRN Emery Arias PA-C    heparin (porcine) 5,000 Units Subcutaneous Q8H Kika Solares MD    hydrocortisone sodium succinate 50 mg Intravenous Q6H [address] Mara Gonzalez PA-C    insulin lispro 1-5 Units Subcutaneous Q6H [address] REMEDIOS Jaimes    ipratropium-albuterol 3 mL Nebulization Q6H Kathy Bryson MD    lacosamide 100 mg Per G Tube Q12H Kika Solares MD    lactobacillus acidophilus-bulgaricus 1 packet Oral BID Annamaria Carr MD    metroNIDAZOLE 500 mg Intravenous Q8H Neetu Melvin PA-C Last Rate: Stopped (10/08/18 2975)   mupirocin  Nasal Q12H [address] Annamaria Carr MD    norepinephrine 1-30 mcg/min Intravenous Titrated Emery Arias PA-C Last Rate: Stopped (10/08/18 9661)   polyethylene glycol 17 g Per G Tube Daily Annamaria Carr MD    propofol 5-50 mcg/kg/min Intravenous Titrated Chen Cook MD    QUEtiapine 25 mg Per Cleatonamy Peters MD    QUEtiapine 50 mg Per G Tube HS Annamaria Carr MD      Continuous Infusions:   norepinephrine 1-30 mcg/min Last Rate: Stopped (10/08/18 0958)   propofol 5-50 mcg/kg/min      PRN Meds: bisacodyl    fentanyl citrate (PF)    Abnormal Labs/Diagnostic Results:   BANDS 20 BUN 65 CR 3 37 GFR 19 HGB 6 9 PLT 81 PHOS 5 9 ION CA 1 10   pH, Arterial 7 350 - 7 450 7 371     PH ART TC 7 350 - 7 450 7 386     pCO2, Arterial 36 0 - 44 0 mm Hg 45 0   H    PCO2 (TC) Arterial 36 0 - 44 0 mm Hg 43 1     pO2, Arterial 75 0 - 129 0 mm Hg 77 9     PO2 (TC) Arterial 75 0 - 129 0 mm Hg 72 9   L    HCO3, Arterial 22 0 - 28 0 mmol/L 25 5     Base Excess, Arterial mmol/L 0 1     O2 Content, Arterial 16 0 - 23 0 mL/dL 10 2   L    O2 HGB,Arterial  94 0 - 97 0 % 94 1     CHAVO TEST  Yes     Temperature Degrees Fehrenheit 96 8     Vent Type- AC  AC     AC Rate  18     Tidal Volume ml 400     Inspired Air (FIO2)  40     PEEP  5       Age/Sex: 52 y o  male     Assessment/Plan: TRANSFUSE 1UPRBC'S  Patient was intubated yesterday and remains on mechanical ventilator support     He is requiring IV pressors to maintain his blood pressure of as he has had hypotension  Patient is on IV vasopressin 0 04 units/hour and also on norepinephrine 4 mcg per hour IV  Blood pressure 96/47 mm Hg        Patient also with anemia present on admission but hemoglobin has gradually decreased and today is 6 9  Leukocytosis improving patient's white blood cell count is 9 97  Also serum creatinine has increased up to 3 37 was sodium 141      Portable chest x-ray from yesterday reviewed pay  Patient has moderate large right pleural effusion with right upper lower lobe infiltrates and also some left perihilar infiltrate      Patient is on pressure support mode with pressure of 10 cm H2O oxygen 50% and peep of 5  Tidal volumes are greater than 400 ml  respiratory rate is 16     Lung sounds reveal few rhonchi bilaterally  Heart sounds regular rhythm    Abdomen is soft nontender     I contacted patient's guardian and did discuss blood transfusion with her regarding patient's decreasing hemoglobin and hypotension  I spoke with Marcela Agee and then Wilbern Sicard  We will transfuse 1 unit packed red blood cells      Patient placed on stress dose steroids because of his hypotension  Vitamin-C discontinued is patient's creatinine is increasing  Patient had been on IV cefepime as blood cultures did reveal Proteus mirabilis and also showed Morganella Morganii appeared Flagyl was added yesterday is able concern patient may worsen due to some aspiration  Suspect the this is probably bacterial pneumonia from the Proteus and Morganella morganii    Leukocytosis has gradually improved and patient continues to improve will consider stopping Flagyl and vancomycin tomorrow which were added yesterday  Discharge Plan: TBD

## 2018-10-08 NOTE — PROGRESS NOTES
Daily Progress Note - Critical Care/ Stepdown   Simón Sotelo 52 y o  male MRN: [id number]  Unit/Bed#: ICU 04 Encounter: [id number]    ______________________________________________________________________  Assessment:   Principal Problem:    Septic shock ([facility] [address] )  Active Problems:    Pneumonia of right upper lobe due to infectious organism [facility])    Acute respiratory failure with hypoxia ([facility] [address] )    Acute kidney injury ([facility] [address] )    Urinary tract infection associated with catheterization of urinary tract ([facility] [address] )    ATN (acute tubular necrosis) ([facility])    Pressure injury of right buttock, unstageable ([facility] [address] )    Seizure disorder ([facility])    Impulse control disorder    Down syndrome  Resolved Problems:    Leukocytosis      * Septic shock ([facility] [address] )   Assessment & Plan    · Admitted 10/2 with sepsis secondary to UTI and pneumonia  · Improved and transferred to the floor on 10/4 , and return early morning 10/5 with hypoxia and hypotension  · White count continues to improve  Procalcitonin improving  · Became hypotensive  after intubation  Given 1 L normal saline and started on Levophed  Levophed use escalated to 15 at, at that time vasopressin and steroids were added  Initially started on high-dose vitamin C and thiamine, however in light of renal dysfunction will hold on further ascorbic acid  · Echocardiogram with normal EF no significant valvular dysfunction, no findings that would suggest moderate or severe pulmonary hypertension  · Procalcitonin on admission: 10 72, history 2 5    Continues with bandemia  · Current Procalcitonin: 1 69  · BCX x2: Proteus  · Influenza A/B: Negative  · Sputum Cx and GS: Pending  · UCx:  E coli, Morganella morganii, beta-hemolytic Streptococcus group G  · Repeat CXR on 10/8/18:  · Worsening bilateral infiltrates, left side greater than right, and moderate-sized right pleural effusion  · Continue Day #7: Cefepime  · Continue Day #6: Flagyl  · Remains Intubated; SBT as Tolerated  · Continue Levophed  · Wean Accordingly     Acute respiratory failure with hypoxia (HCC)   Assessment & Plan    · Currently intubated on mechanical ventilation secondary to acute on chronic hypoxic and hypercapnic respiratory failure  Large amount of secretions  · Sputum culture: mixed respiratory luann  · Repeat CXR on 10/8/18:  · Worsening bilateral infiltrates, left side greater than right, and moderate-sized right pleural effusion  · Continue Day #6 Flagyl due to concern for inability to handle secretions  · Continue Ventilator Support     Pneumonia of right upper lobe due to infectious organism [facility])   Assessment & Plan    · Repeat CXR on 10/8/18:  · Worsening bilateral infiltrates, left side greater than right, and moderate-sized right pleural effusion  · Procalcitonin: 1 69  · Influenza A/B: Negative  · BCx x2: Proteus  · Sputum CX/GS positive for respiratory luann  · Continue Day #7: Cefepime  · Continue Day #6: Flagyl       Acute kidney injury ([facility] )   Assessment & Plan    · Baseline Cr: 0 9-1  · Cr on admission: 2 79 - continues to worsen - Current Cr: 3 35  · Will monitor with daily CMP  · Diuresed with Lasix on 10/5 and 10/6 due to concern for volume overload  · Nephrology Recommendations Appreciated  · YOLA secondary to ATN/Septic Shock  · Likely Plateau Phase of ATN  · Continue to monitor     Urinary tract infection associated with catheterization of urinary tract ([facility] )   Assessment & Plan    · UA on admission:   · + Leukocytes, Large Blood, Innumerable WBC/Bacteria  · UCx with E coli  ? Colonizer given suprapubic catheter  · Hx of Suprapubic Catheter   · Continue ABx therapy As Above     ATN (acute tubular necrosis) ([facility] )   Assessment & Plan    · Nephrology Following     Leukocytosisresolved as of 10/6/2018   Assessment & Plan    · Resolved  · Continues with bandemia   · WBC on admission: 20 74  · Current WBC: 13 51  · Will monitor with daily CBC     Pressure injury of right buttock, unstageable ([facility] )   Assessment & Plan · POA  · Continue Daily Wound Care  · Frequent Turning and Offloading     Seizure disorder ([address] )   Assessment & Plan    · Continue home medications:   · Vimpat     Down syndrome   Assessment & Plan    · At Baseline     Impulse control disorder   Assessment & Plan    · At Baseline  · Continue at home medication: Seroquel  · Agitation Control prn         Plan:    Neuro:   · Sedation plan: Fentanyl and Propofol  · RASS goal: -3 - 0  · SBT as Tolerated  · Hx of Seizures  · Continue Vimpat  · Impulse Control Disorder  · Continue Seroquel  · Delirium: CAM ICU positive no   · Regulate sleep/wake cycle     CV:   · Continue Levophed  · Titrate and Wean Accordingly   · Rhythm: NSR  · Follow rhythm on telemetry    Pulm:   · Continue Nebulizer Treatments  · Mucomyst and Duonebs  · Continue Ventilator Support  · SBT plan:   · Chest PT ordered: yes   · Chlorhexidine ordered: yes   · HOB >30 degrees: yes     GI:   · Nutrition/diet plan: Tube Feeding  · Stress ulcer prophylaxis: Pepcid PO  · Bowel regimen: Miralax and Dulcolax Suppository  · Last BM: 10/8/18    FEN:   · Fluid/Diuretic plan: No intervention  · Goal 24 hour fluid balance: Euvolemic  · Electrolytes repleted: yes  · Goal: K >4 0, Mag >2 0, and Phos >3 0    :   · Chronic Suprapubic Cath    ID:   · Abx ordered: Cefepime and Flagyl  · Day #7 Cefepime  · Day #6 Flagyl  · Trend temps and WBC count    Heme:   · Trend hgb and plts    Endo:   · Blood Glucose: 142  · Glycemic control plan: Blood glucose controlled on current regimen    Msk/Skin:  · Mobility goal: OOB  · PT consult: yes  · OT consult: yes  · Frequent turning and off-loading    Family:  · Family updated within 24 hours: yes   · Family meeting planned today: no     Lines:  · Central venous access: triple lumen catheter - 20 cm  · Keep central line today for meds requiring central line, hemodynamic monitoring  · Arterial line: Assessed  Continued for the following reasons hemodynamic monitoring       VTE Prophylaxis:  · Pharmacologic Prophylaxis: Heparin  · Mechanical Prophylaxis: sequential compression device    Disposition: Continue ICU care    Code Status: Level 1 - Full Code    Counseling / Coordination of Care  Total Critical Care time spent 30 minutes excluding procedures, teaching and family updates  ______________________________________________________________________    HPI/24hr events:  Patient seen and examined at bedside  No acute events overnight     ______________________________________________________________________    Physical Exam:   Physical Exam   Constitutional: He appears well-developed and well-nourished  No distress  HENT:   Head: Microcephalic  Eyes: Pupils are equal, round, and reactive to light  Conjunctivae and EOM are normal    Neck: Normal range of motion  Neck supple  Cardiovascular: Normal rate, regular rhythm, normal heart sounds and intact distal pulses  Exam reveals no gallop and no friction rub  No murmur heard  Pulmonary/Chest: Effort normal and breath sounds normal  No respiratory distress  He has no wheezes  He has no rales  He exhibits no tenderness  Abdominal: Soft  Bowel sounds are normal  He exhibits no distension and no mass  There is no tenderness  There is no rebound and no guarding  No hernia  PEG   Genitourinary:   Genitourinary Comments: Suprapubic Catheter    Musculoskeletal: He exhibits no edema  Neurological: He exhibits abnormal muscle tone  Hx of Down Syndrome   Skin: Skin is warm  Capillary refill takes less than 2 seconds  He is not diaphoretic  Nursing note and vitals reviewed      ______________________________________________________________________  Vitals:    10/08/18 1100 10/08/18 1101 10/08/18 1116 10/08/18 1322   BP: 92/51      Pulse: 72      Resp: 13      Temp:  98 °F (36 7 °C)     TempSrc:  Temporal     SpO2: 96%  95% 96%   Weight:       Height:         Arterial Line BP: 93/40  Arterial Line MAP (mmHg): 57 mmHg (Dr Shaikh aware of MAP below 65, will continue to monitor)     Temperature:   Temp (24hrs), Av 6 °F (36 4 °C), Min:97 3 °F (36 3 °C), Max:98 °F (36 7 °C)    Current Temperature: 98 °F (36 7 °C)    Weights:   IBW: 63 8 kg    Body mass index is 25 26 kg/m²  Weight (last 2 days)     Date/Time   Weight    10/08/18 06  71 (156 53)    10/07/18 0600  69 (152 12)    10/06/18 06  67 7 (149 25)              Hemodynamic Monitoring:  PAP:    CO:  [8 5 L/min-10 4 L/min] 8 7 L/min  CI:  [5 L/min/m2-6 1 L/min/m2] 5 1 L/min/m2    Non-Invasive/Invasive Ventilation Settings:  Respiratory    Lab Data (Last 4 hours)    None         O2/Vent Data (Last 4 hours)      10/08 1116           Vent Mode AC/VC       Resp Rate (BPM) (BPM) 14       Vt (mL) (mL) 400       FIO2 (%) (%) 40       PEEP (cmH2O) (cmH2O) 5       MV 6 6                 No results found for: PHART, KVP2MYQ, PO2ART, LAP1YWN, V9POTIWU, BEART, SOURCE  SpO2: SpO2: 96 %    Intake and Outputs:  I/O       10/06 0701 - 10/07 0700 10/07 0701 - 10/08 0700 10/08 0701 - 10/09 07    I V  (mL/kg) 378 7 (5 5) 223 (3 1)     Blood 350      NG/ 930     IV Piggyback 400 400     Feedings 950 1200     Total Intake(mL/kg) 2828 7 (41) 2753 (38 8)     Urine (mL/kg/hr) 1025 (0 6) 1095 (0 6) 125 (1 2)    Total Output 1025 1095 125    Net +1803 7 +1658 -125           Unmeasured Stool Occurrence 2 x          Nutrition:        Diet Orders            Start     Ordered    10/07/18 0951  Diet Enteral/Parenteral; Tube Feeding No Oral Diet; Jevity 1 5; Continuous; 50  Diet effective now     Comments:  Give at 50ml/hr until 1035 is infused in 22 hours with H2O flush of 150ml/hr q4h   Question Answer Comment   Diet Type Enteral/Parenteral    Enteral/Parenteral Tube Feeding No Oral Diet    Tube Feeding Formula: Jevity 1 5    Bolus/Cyclic/Continuous Continuous    Tube Feeding Goal Rate (mL/hr): 50    RD to adjust diet per protocol?  Yes        10/07/18 1002    10/02/18 1926  Room Service  Once Question:  Type of Service  Answer:  Room Service- Not Appropriate    10/02/18 1925        Labs:     Results from last 7 days  Lab Units 10/08/18  0520 10/07/18  0545 10/06/18  1658 10/06/18  0552   WBC Thousand/uL 17 14* 16 74*  --  9 97   HEMOGLOBIN g/dL 7 7* 7 5* 7 7* 6 9*   HEMATOCRIT % 24 1* 22 7* 23 9* 21 3*   PLATELETS Thousands/uL 88* 80*  --  81*   MONO PCT MAN % 8 8  --  1*       Results from last 7 days  Lab Units 10/08/18  0520 10/07/18  0545 10/06/18  0552 10/05/18  1836   SODIUM mmol/L 141 141 141 141   POTASSIUM mmol/L 4 6 4 0 4 9 4 2   CHLORIDE mmol/L 106 105 104 106   CO2 mmol/L 25 25 24 26   BUN mg/dL 85* 73* 65* 61*   CREATININE mg/dL 3 35* 3 40* 3 37* 2 99*   CALCIUM mg/dL 8 0* 8 2* 8 3 8 2*   ALK PHOS U/L 121* 131*  --  142*   ALT U/L 16 18  --  21   AST U/L 16 20  --  25       Results from last 7 days  Lab Units 10/08/18  0520 10/07/18  0545 10/06/18  0552   MAGNESIUM mg/dL 2 2 2 3 2 3     Lab Results   Component Value Date    PHOS 6 4 (H) 10/08/2018    PHOS 5 8 (H) 10/07/2018    PHOS 5 9 (H) 10/06/2018        Results from last 7 days  Lab Units 10/02/18  1156   INR  1 25*   PTT seconds 30     No results found for: TROPONINI    Results from last 7 days  Lab Units 10/05/18  0350 10/02/18  1400 10/02/18  1155   LACTIC ACID mmol/L 1 0 1 4 3 3*     ABG:  Lab Results   Component Value Date    PHART 7 371 10/06/2018    XOO3LFE 45 0 (H) 10/06/2018    PO2ART 77 9 10/06/2018    NGI7TFJ 25 5 10/06/2018    BEART 0 1 10/06/2018    SOURCE Line, Arterial 10/05/2018       Imaging: CXR:  I have personally reviewed pertinent reports  ECHO:  I have personally reviewed pertinent reports        Micro:  Lab Results   Component Value Date    BLOODCX Proteus mirabilis (A) 10/02/2018    BLOODCX Morganella morganii (A) 10/02/2018    BLOODCX Proteus mirabilis (A) 10/02/2018    BLOODCX Morganella morganii (A) 10/02/2018    URINECX >100,000 cfu/ml Escherichia coli (A) 10/02/2018    URINECX >100,000 cfu/ml Morganella morganii (A) 10/02/2018    URINECX (A) 10/02/2018     >100,000 cfu/ml Beta Hemolytic Streptococcus Group G    SPUTUMCULTUR 1+ Growth of  10/05/2018       Allergies: No Known Allergies  Medications:   Scheduled Meds:    Current Facility-Administered Medications:  acetylcysteine 3 mL Nebulization Q6H While awake Neeru Tim MD    bisacodyl 10 mg Rectal Daily PRN Brianna Anne MD    cefepime 1,000 mg Intravenous Q24H Brianna Anne MD Last Rate: 1,000 mg (10/08/18 1315)   chlorhexidine 15 mL Swish & Spit Q12H [address] Venessa Romano PA-C    famotidine 20 mg Per G Tube Daily Mara Gonzalez PA-C    fentanyl citrate (PF) 25 mcg Intravenous Q2H PRN Venessa Romano PA-C    heparin (porcine) 5,000 Units Subcutaneous Q8H Oli Bartlett MD    hydrocortisone sodium succinate 50 mg Intravenous Q6H [address] Mara Gonzalez PA-C    insulin lispro 1-5 Units Subcutaneous Q6H [address] REMEDIOS Jaimes    ipratropium-albuterol 3 mL Nebulization Q6H Medardo Sanders MD    lacosamide 100 mg Per G Tube Q12H Oli Bartlett MD    lactobacillus acidophilus-bulgaricus 1 packet Oral BID Brianna Anne MD    metroNIDAZOLE 500 mg Intravenous Q8H Neetu Melvin PA-C Last Rate: 500 mg (10/08/18 1345)   mupirocin  Nasal Q12H [address] Brianna Anne MD    norepinephrine 1-30 mcg/min Intravenous Titrated Venessa Romano PA-C Last Rate: Stopped (10/08/18 5175)   polyethylene glycol 17 g Per G Tube Daily Brianna Anne MD    propofol 5-50 mcg/kg/min Intravenous Titrated Neeru Tim MD Last Rate: 5 mcg/kg/min (10/08/18 1426)   QUEtiapine 25 mg Per G Tube QAM Brianna Anne MD    QUEtiapine 50 mg Per G Tube HS Brianna Anne MD      Continuous Infusions:    norepinephrine 1-30 mcg/min Last Rate: Stopped (10/08/18 2472)   propofol 5-50 mcg/kg/min Last Rate: 5 mcg/kg/min (10/08/18 1426)     PRN Meds:    bisacodyl 10 mg Daily PRN   fentanyl citrate (PF) 25 mcg Q2H PRN       Invasive lines and devices:   Invasive Devices     Central Venous Catheter Line            CVC Central Lines 10/05/18 Triple 20cm 2 days          Peripheral Intravenous Line            Long-Dwell Peripheral IV (Midline) 61/68/16 Right Basilic 4 days          Arterial Line            Arterial Line 10/05/18 Right Radial 2 days          Drain            Gastrostomy/Enterostomy Percutaneous endoscopic gastrostomy (PEG) LLQ -- days    Suprapubic Catheter 18 Fr  -- days    Urethral Catheter 18 Fr  -- days          Airway            ETT  Hi-Lo 8 mm 3 days                   SIGNATURE: Kellie Brian MD  DATE: October 8, 2018

## 2018-10-09 PROBLEM — D64.9 ANEMIA: Chronic | Status: ACTIVE | Noted: 2018-10-09

## 2018-10-09 LAB
ANION GAP SERPL CALCULATED.3IONS-SCNC: 8 MMOL/L (ref 4–13)
ANISOCYTOSIS BLD QL SMEAR: PRESENT
BASO STIPL BLD QL SMEAR: PRESENT
BASOPHILS # BLD MANUAL: 0 THOUSAND/UL (ref 0–0.1)
BASOPHILS NFR MAR MANUAL: 0 % (ref 0–1)
BUN SERPL-MCNC: 92 MG/DL (ref 5–25)
CALCIUM SERPL-MCNC: 8 MG/DL (ref 8.3–10.1)
CHLORIDE SERPL-SCNC: 108 MMOL/L (ref 100–108)
CO2 SERPL-SCNC: 28 MMOL/L (ref 21–32)
CREAT SERPL-MCNC: 2.97 MG/DL (ref 0.6–1.3)
EOSINOPHIL # BLD MANUAL: 0 THOUSAND/UL (ref 0–0.4)
EOSINOPHIL NFR BLD MANUAL: 0 % (ref 0–6)
ERYTHROCYTE [DISTWIDTH] IN BLOOD BY AUTOMATED COUNT: 17.3 % (ref 11.6–15.1)
GFR SERPL CREATININE-BSD FRML MDRD: 23 ML/MIN/1.73SQ M
GLUCOSE SERPL-MCNC: 174 MG/DL (ref 65–140)
GLUCOSE SERPL-MCNC: 204 MG/DL (ref 65–140)
GLUCOSE SERPL-MCNC: 55 MG/DL (ref 65–140)
GLUCOSE SERPL-MCNC: 58 MG/DL (ref 65–140)
GLUCOSE SERPL-MCNC: 77 MG/DL (ref 65–140)
GLUCOSE SERPL-MCNC: 78 MG/DL (ref 65–140)
GLUCOSE SERPL-MCNC: 81 MG/DL (ref 65–140)
GLUCOSE SERPL-MCNC: 82 MG/DL (ref 65–140)
GLUCOSE SERPL-MCNC: 98 MG/DL (ref 65–140)
HCT VFR BLD AUTO: 22 % (ref 36.5–49.3)
HGB BLD-MCNC: 7.1 G/DL (ref 12–17)
LACOSAMIDE SERPL-MCNC: 16.2 UG/ML (ref 5–10)
LYMPHOCYTES # BLD AUTO: 1.68 THOUSAND/UL (ref 0.6–4.47)
LYMPHOCYTES # BLD AUTO: 17 % (ref 14–44)
MACROCYTES BLD QL AUTO: PRESENT
MAGNESIUM SERPL-MCNC: 2.1 MG/DL (ref 1.6–2.6)
MCH RBC QN AUTO: 33.5 PG (ref 26.8–34.3)
MCHC RBC AUTO-ENTMCNC: 32.3 G/DL (ref 31.4–37.4)
MCV RBC AUTO: 104 FL (ref 82–98)
MONOCYTES # BLD AUTO: 0.5 THOUSAND/UL (ref 0–1.22)
MONOCYTES NFR BLD: 5 % (ref 4–12)
NEUTROPHILS # BLD MANUAL: 7.72 THOUSAND/UL (ref 1.85–7.62)
NEUTS BAND NFR BLD MANUAL: 22 % (ref 0–8)
NEUTS SEG NFR BLD AUTO: 56 % (ref 43–75)
NRBC BLD AUTO-RTO: 0 /100 WBCS
PHOSPHATE SERPL-MCNC: 6.5 MG/DL (ref 2.7–4.5)
PLATELET # BLD AUTO: 62 THOUSANDS/UL (ref 149–390)
PLATELET BLD QL SMEAR: ABNORMAL
PMV BLD AUTO: 11.9 FL (ref 8.9–12.7)
POLYCHROMASIA BLD QL SMEAR: PRESENT
POTASSIUM SERPL-SCNC: 4.2 MMOL/L (ref 3.5–5.3)
PROCALCITONIN SERPL-MCNC: 1.09 NG/ML
RBC # BLD AUTO: 2.12 MILLION/UL (ref 3.88–5.62)
RBC MORPH BLD: PRESENT
SODIUM SERPL-SCNC: 144 MMOL/L (ref 136–145)
TARGETS BLD QL SMEAR: PRESENT
TOTAL CELLS COUNTED SPEC: 100
WBC # BLD AUTO: 9.9 THOUSAND/UL (ref 4.31–10.16)

## 2018-10-09 PROCEDURE — 94760 N-INVAS EAR/PLS OXIMETRY 1: CPT

## 2018-10-09 PROCEDURE — 99291 CRITICAL CARE FIRST HOUR: CPT | Performed by: ANESTHESIOLOGY

## 2018-10-09 PROCEDURE — 94640 AIRWAY INHALATION TREATMENT: CPT

## 2018-10-09 PROCEDURE — 85007 BL SMEAR W/DIFF WBC COUNT: CPT | Performed by: ANESTHESIOLOGY

## 2018-10-09 PROCEDURE — 99232 SBSQ HOSP IP/OBS MODERATE 35: CPT | Performed by: INTERNAL MEDICINE

## 2018-10-09 PROCEDURE — 85027 COMPLETE CBC AUTOMATED: CPT | Performed by: ANESTHESIOLOGY

## 2018-10-09 PROCEDURE — 80048 BASIC METABOLIC PNL TOTAL CA: CPT | Performed by: ANESTHESIOLOGY

## 2018-10-09 PROCEDURE — 83735 ASSAY OF MAGNESIUM: CPT | Performed by: ANESTHESIOLOGY

## 2018-10-09 PROCEDURE — 82948 REAGENT STRIP/BLOOD GLUCOSE: CPT

## 2018-10-09 PROCEDURE — 94669 MECHANICAL CHEST WALL OSCILL: CPT

## 2018-10-09 PROCEDURE — 84145 PROCALCITONIN (PCT): CPT | Performed by: NURSE PRACTITIONER

## 2018-10-09 PROCEDURE — 84100 ASSAY OF PHOSPHORUS: CPT | Performed by: ANESTHESIOLOGY

## 2018-10-09 RX ORDER — HALOPERIDOL 5 MG/ML
5 INJECTION INTRAMUSCULAR ONCE
Status: COMPLETED | OUTPATIENT
Start: 2018-10-09 | End: 2018-10-09

## 2018-10-09 RX ORDER — HALOPERIDOL 5 MG/ML
2.5 INJECTION INTRAMUSCULAR ONCE
Status: COMPLETED | OUTPATIENT
Start: 2018-10-09 | End: 2018-10-09

## 2018-10-09 RX ORDER — DEXTROSE MONOHYDRATE 25 G/50ML
25 INJECTION, SOLUTION INTRAVENOUS ONCE
Status: COMPLETED | OUTPATIENT
Start: 2018-10-09 | End: 2018-10-09

## 2018-10-09 RX ORDER — LANOLIN ALCOHOL/MO/W.PET/CERES
6 CREAM (GRAM) TOPICAL
Status: DISCONTINUED | OUTPATIENT
Start: 2018-10-09 | End: 2018-10-12

## 2018-10-09 RX ORDER — DEXTROSE MONOHYDRATE 25 G/50ML
50 INJECTION, SOLUTION INTRAVENOUS ONCE
Status: COMPLETED | OUTPATIENT
Start: 2018-10-09 | End: 2018-10-09

## 2018-10-09 RX ORDER — POLYETHYLENE GLYCOL 3350 17 G/17G
17 POWDER, FOR SOLUTION ORAL DAILY PRN
Status: DISCONTINUED | OUTPATIENT
Start: 2018-10-09 | End: 2018-10-12

## 2018-10-09 RX ADMIN — HEPARIN SODIUM 5000 UNITS: 5000 INJECTION, SOLUTION INTRAVENOUS; SUBCUTANEOUS at 05:11

## 2018-10-09 RX ADMIN — LACOSAMIDE 100 MG: 50 TABLET, FILM COATED ORAL at 21:16

## 2018-10-09 RX ADMIN — IPRATROPIUM BROMIDE AND ALBUTEROL SULFATE 3 ML: .5; 3 SOLUTION RESPIRATORY (INHALATION) at 01:36

## 2018-10-09 RX ADMIN — ACETYLCYSTEINE 600 MG: 200 INHALANT RESPIRATORY (INHALATION) at 07:25

## 2018-10-09 RX ADMIN — QUETIAPINE 25 MG: 25 TABLET ORAL at 08:43

## 2018-10-09 RX ADMIN — LACOSAMIDE 100 MG: 50 TABLET, FILM COATED ORAL at 08:42

## 2018-10-09 RX ADMIN — PROPOFOL 10 MCG/KG/MIN: 10 INJECTION, EMULSION INTRAVENOUS at 00:42

## 2018-10-09 RX ADMIN — IPRATROPIUM BROMIDE AND ALBUTEROL SULFATE 3 ML: .5; 3 SOLUTION RESPIRATORY (INHALATION) at 13:08

## 2018-10-09 RX ADMIN — LACTOBACILLUS ACIDOPHILUS / LACTOBACILLUS BULGARICUS 1 PACKET: 100 MILLION CFU STRENGTH GRANULES at 17:29

## 2018-10-09 RX ADMIN — DEXTROSE MONOHYDRATE 50 ML: 25 INJECTION, SOLUTION INTRAVENOUS at 13:06

## 2018-10-09 RX ADMIN — Medication 20 MG: at 15:06

## 2018-10-09 RX ADMIN — HALOPERIDOL LACTATE 2.5 MG: 5 INJECTION, SOLUTION INTRAMUSCULAR at 12:14

## 2018-10-09 RX ADMIN — LACTOBACILLUS ACIDOPHILUS / LACTOBACILLUS BULGARICUS 1 PACKET: 100 MILLION CFU STRENGTH GRANULES at 08:42

## 2018-10-09 RX ADMIN — HEPARIN SODIUM 5000 UNITS: 5000 INJECTION, SOLUTION INTRAVENOUS; SUBCUTANEOUS at 21:16

## 2018-10-09 RX ADMIN — DEXTROSE MONOHYDRATE 25 ML: 25 INJECTION, SOLUTION INTRAVENOUS at 12:05

## 2018-10-09 RX ADMIN — HALOPERIDOL LACTATE 5 MG: 5 INJECTION, SOLUTION INTRAMUSCULAR at 15:09

## 2018-10-09 RX ADMIN — QUETIAPINE 50 MG: 25 TABLET ORAL at 21:16

## 2018-10-09 RX ADMIN — CHLORHEXIDINE GLUCONATE 0.12% ORAL RINSE 15 ML: 1.2 LIQUID ORAL at 08:42

## 2018-10-09 RX ADMIN — POLYETHYLENE GLYCOL 3350 17 G: 17 POWDER, FOR SOLUTION ORAL at 08:43

## 2018-10-09 RX ADMIN — INSULIN LISPRO 1 UNITS: 100 INJECTION, SOLUTION INTRAVENOUS; SUBCUTANEOUS at 05:47

## 2018-10-09 RX ADMIN — IPRATROPIUM BROMIDE AND ALBUTEROL SULFATE 3 ML: .5; 3 SOLUTION RESPIRATORY (INHALATION) at 19:17

## 2018-10-09 RX ADMIN — HEPARIN SODIUM 5000 UNITS: 5000 INJECTION, SOLUTION INTRAVENOUS; SUBCUTANEOUS at 15:06

## 2018-10-09 RX ADMIN — HYDROCORTISONE SODIUM SUCCINATE 50 MG: 100 INJECTION, POWDER, FOR SOLUTION INTRAMUSCULAR; INTRAVENOUS at 05:11

## 2018-10-09 RX ADMIN — MELATONIN TAB 3 MG 6 MG: 3 TAB at 21:15

## 2018-10-09 RX ADMIN — FAMOTIDINE 20 MG: 40 POWDER, FOR SUSPENSION ORAL at 08:44

## 2018-10-09 RX ADMIN — IPRATROPIUM BROMIDE AND ALBUTEROL SULFATE 3 ML: .5; 3 SOLUTION RESPIRATORY (INHALATION) at 07:25

## 2018-10-09 RX ADMIN — METRONIDAZOLE 500 MG: 500 INJECTION, SOLUTION INTRAVENOUS at 05:11

## 2018-10-09 NOTE — RESPIRATORY THERAPY NOTE
RT Ventilator Management Note  Kenyetta Zamora 52 y o  male MRN: [id number]  Unit/Bed#: ICU 04 Encounter: [id number]      Daily Screen       10/9/2018 0725 10/9/2018 1102          Patient safety screen outcome[de-identified] Passed -      RSBI: - 13              Physical Exam:   Assessment Type: Assess only  General Appearance: Alert, Awake  Respiratory Pattern: Symmetrical, Spontaneous  Chest Assessment: Chest expansion symmetrical  Bilateral Breath Sounds: Clear, Diminished  Cough: None  O2 Device: NC started post extubation      Extubated to 4L NC, clear diminished breath sounds, no stridor, tolerated well, RN Zuhair Shah and Bernardo at bedside

## 2018-10-09 NOTE — PROGRESS NOTES
Progress Note - Nephrology   Mei Mosquera 52 y o  male MRN: [id number]  Unit/Bed#: ICU 04 Encounter: [id number]    Assessment:  1  Acute renal failure/acute kidney injury secondary to acute tubular necrosis  In 2017 his baseline creatinine had been 0 9-1 2  On admission on October 2nd his creatinine was 2 7  Prior to today, October 9th, his creatinine had been 3 5-3 4 range  Today, October 9th his creatinine has decreased to 2 9  I think he may be beyond the plateau phase of ATN  The ATN with secondary to septic shock due to Proteus mirabilis pneumonia with bacteremia the patient also has a suprapubic catheter with a polymicrobial urinary tract infection  His kidney function is improving  2  Fluid management:  He does not examine to be fluid overloaded, he is on 40% FiO2  His electrolytes are stable  Watch sodium levels as his sodium levels increasing 144  He may need more free water replacement will defer to Critical Care  Plan:  As above, his kidney function is improving        Subjective:   Patient seen in follow-up for acute renal failure  Patient is still sedated and intubated  Chart notes reviewed  Systolic blood pressure 299/44 no acute events overnight  Patient is nonoliguric urine output 1 4 L over the past 24 hours  Objective:     Vitals: Blood pressure 99/58, pulse 59, temperature (!) 96 7 °F (35 9 °C), temperature source Temporal, resp  rate 14, height 5' 6" (1 676 m), weight 71 8 kg (158 lb 4 6 oz), SpO2 97 %  ,Body mass index is 25 55 kg/m²  Weight (last 2 days)     Date/Time   Weight    10/09/18 0319  71 8 (158 29)    10/08/18 0600  71 (156 53)    10/07/18 0600  69 (152 12)                Intake/Output Summary (Last 24 hours) at 10/09/18 0707  Last data filed at 10/09/18 0600   Gross per 24 hour   Intake           607 54 ml   Output             1460 ml   Net          -852 46 ml       Urethral Catheter 18 Fr   (Active)       Physical Exam: General: patient is in NAD  Skin: Microcephaly as noted  Eyes:  There is no scleral icterus  Neck:  Supple no adenopathy  Chest:  Lung sounds are coarse  CVS:  S1-S2  Abdomen:  Positive bowel sounds  Extremities:  No significant edema  Neuro:  Sedated on the ventilator                Prescriptions Prior to Admission   Medication    bisacodyl (DULCOLAX) 10 mg suppository    Docusate Sodium 50 MG/15ML LIQD    famotidine (PEPCID) 40 mg/5 mL suspension    ketoconazole (NIZORAL) 2 % cream    lacosamide (VIMPAT) 50 mg    lactobacillus acidophilus-bulgaricus (FLORANEX) tablet    Melatonin 5 MG TABS    Mouthwashes (BIOTENE DRY MOUTH) LIQD    Nutritional Supplements (NUTREN 1 5 PO)    polyethylene glycol (MIRALAX) 17 g packet    QUEtiapine (SEROquel) 25 mg tablet    QUEtiapine (SEROquel) 50 mg tablet    silver sulfadiazine (SILVADENE,SSD) 1 % cream       Current Facility-Administered Medications   Medication Dose Route Frequency    acetylcysteine (MUCOMYST) 200 mg/mL inhalation solution 600 mg  3 mL Nebulization Q6H While awake    bisacodyl (DULCOLAX) rectal suppository 10 mg  10 mg Rectal Daily PRN    cefepime (MAXIPIME) IVPB (premix) 1,000 mg  1,000 mg Intravenous Q24H    chlorhexidine (PERIDEX) 0 12 % oral rinse 15 mL  15 mL Swish & Spit Q12H [address]    famotidine (PEPCID) oral suspension 20 mg  20 mg Per G Tube Daily    fentanyl citrate (PF) 100 MCG/2ML 25 mcg  25 mcg Intravenous Q2H PRN    heparin (porcine) subcutaneous injection 5,000 Units  5,000 Units Subcutaneous Q8H [address]    hydrocortisone sodium succinate (PF) (Solu-CORTEF) injection 50 mg  50 mg Intravenous Q6H [address]    insulin lispro (HumaLOG) 100 units/mL subcutaneous injection 1-5 Units  1-5 Units Subcutaneous Q6H [address]    ipratropium-albuterol (DUO-NEB) 0 5-2 5 mg/3 mL inhalation solution 3 mL  3 mL Nebulization Q6H    lacosamide (VIMPAT) tablet 100 mg  100 mg Per G Tube Q12H [address]    lactobacillus acidophilus-bulgaricus (FLORANEX) packet 1 packet  1 packet Oral BID    metroNIDAZOLE (FLAGYL) IVPB (premix) 500 mg  500 mg Intravenous Q8H    polyethylene glycol (MIRALAX) packet 17 g  17 g Per G Tube Daily    propofol (DIPRIVAN) 1000 mg in 100 mL infusion (premix)  5-50 mcg/kg/min Intravenous Titrated    QUEtiapine (SEROquel) tablet 25 mg  25 mg Per G Tube QAM    QUEtiapine (SEROquel) tablet 50 mg  50 mg Per G Tube HS        Lab, Imaging and other studies: I have personally reviewed pertinent labs    CBC: Lab Results   Component Value Date    WBC 9 90 10/09/2018    RBC 2 12 (L) 10/09/2018     CMP: Lab Results   Component Value Date     10/09/2018     10/09/2018    CO2 28 10/09/2018    BUN 92 (H) 10/09/2018    CREATININE 2 97 (H) 10/09/2018    CALCIUM 8 0 (L) 10/09/2018    AST 16 10/08/2018    ALT 16 10/08/2018    ALKPHOS 121 (H) 10/08/2018    EGFR 23 10/09/2018     Phosphorus:   Lab Results   Component Value Date    PHOS 6 5 (H) 10/09/2018     Magnesium:   Lab Results   Component Value Date    MG 2 1 10/09/2018     Urinalysis: Lab Results   Component Value Date    COLORU Rowan 10/02/2018    CLARITYU Cloudy 10/02/2018    SPECGRAV 1 010 10/02/2018    PHUR >=9 0 10/02/2018    LEUKOCYTESUR Large (A) 10/02/2018    NITRITE Negative 10/02/2018    PROTEINUA 100 (2+) (A) 10/02/2018    GLUCOSEU Negative 10/02/2018    KETONESU Negative 10/02/2018    BILIRUBINUR Interference- unable to analyze (A) 10/02/2018    BLOODU Large (A) 10/02/2018     BMP: Lab Results   Component Value Date    CO2 28 10/09/2018    BUN 92 (H) 10/09/2018    CREATININE 2 97 (H) 10/09/2018    CALCIUM 8 0 (L) 10/09/2018

## 2018-10-09 NOTE — PROGRESS NOTES
Patient's suprapubic catheter inadvertently dislodged secondary to balloon port breaking off from catheter and therefore deflating  Reinsertion technique discussed and confirmed with Dr Dorita Resendiz, urology  Site cleansed with chlorhexidine  16f gallagher catheter reinserted with sterile technique to approximately six inch depth and balloon inflated with 3mL of sterile water, catheter slightly pulled back to ensure correct placement, and then an additional 7mL of sterile water was inserted into balloon for a total of 10mL of sterile water  Clear yellow urine immediately returned upon insertion  Patient tolerated the procedure well with no immediate complications

## 2018-10-09 NOTE — PROGRESS NOTES
Daily Progress Note - Critical Care/ Stepdown   Simón Sotelo 52 y o  male MRN: [id number]  Unit/Bed#: ICU 04 Encounter: [id number]    ______________________________________________________________________  Assessment:   Principal Problem:    Septic shock ([facility] [address] )  Active Problems:    Pneumonia of right upper lobe due to infectious organism [facility])    Acute respiratory failure with hypoxia ([facility] [address] )    Acute kidney injury ([facility] [address] )    Urinary tract infection associated with catheterization of urinary tract ([facility])    ATN (acute tubular necrosis) ([facility])    Gram-negative bacteremia    Toxic metabolic encephalopathy    Pressure injury of right buttock, unstageable ([facility])    Seizure disorder ([facility])    Impulse control disorder    Down syndrome  Resolved Problems:    Leukocytosis      * Septic shock ([facility] [address] )   Assessment & Plan    · Admitted 10/2 with sepsis secondary to UTI and Pneumonia  · Improved and transferred to the floor on 10/4 , and return early morning 10/5 with hypoxia and hypotension  · White count continues to improve  Procalcitonin improving  · Became hypotensive  after intubation  Given 1 L normal saline and started on Levophed  Levophed use escalated to 15 at, at that time vasopressin and steroids were added  Initially started on high-dose vitamin C and thiamine, however in light of renal dysfunction will hold on further ascorbic acid  · Echo on 10/5/18: EF: 60%, no significant valvular dysfunction, no findings that would suggest moderate or severe pulmonary hypertension  · Procalcitonin on admission: 10 72, history 2 5    Continues with bandemia  · Current Procalcitonin: 1 69  · BCX x2: Proteus  · Influenza A/B: Negative  · Sputum Cx and GS: Pending  · UCx:  E coli, Morganella morganii, beta-hemolytic Streptococcus group G  · Repeat CXR on 10/8/18:  · Worsening bilateral infiltrates, left side greater than right, and moderate-sized right pleural effusion  · Levophed Discontinued Yesterday  · Continue Day #8: Cefepime  · Continue Day #7: Flagyl  · Continue Ventilator Support  · Remains Intubated  · SBT as Tolerated     Acute respiratory failure with hypoxia (HCC)   Assessment & Plan    · Currently intubated on mechanical ventilation secondary to acute on chronic hypoxic and hypercapnic respiratory failure  · Large Amount Secretions Initially, which have gradually Improved today  · Sputum culture: Mixed Respiratory Luann  · Repeat CXR on 10/8/18:  · Worsening bilateral infiltrates, left side greater than right, and moderate-sized right pleural effusion  · Continue Day #7 Flagyl due to concern for Aspiration Pneumonia  · Continue Ventilator Support     Pneumonia of right upper lobe due to infectious organism [facility])   Assessment & Plan    · Repeat CXR on 10/8/18:  · Worsening bilateral infiltrates, left side greater than right, and moderate-sized right pleural effusion  · Procalcitonin: 1 69  · Influenza A/B: Negative  · BCx x2: Proteus  · Sputum CX/GS positive for respiratory luann  · Continue Day #8: Cefepime  · Continue Day #7: Flagyl       Ac[facility] kidney [facility] ([facility] )   Assessment & Plan    · Baseline Cr: 0 9-1  · Cr on admission: 2 79   · Current Cr: 2 97  · Will monitor with daily CMP  · Diuresed with Lasix on 10/5 and 10/6 due to concern for volume overload  · Nephrology Recommendations Appreciated  · YOLA secondary to ATN/Septic Shock  · Likely Plateau Phase of ATN  · Continue to Monitor     Urinary tract infection associated with catheterization of urinary tract ([facility] )   Assessment & Plan    · UA on admission:   · + Leukocytes, Large Blood, Innumerable WBC/Bacteria  · UCx with E coli  ? Colonizer given suprapubic catheter  · Hx of Suprapubic Catheter   · Replaced 10/8  · Continue ABx therapy As Above     ATN (acute tubular necrosis) [facility])   Assessment & Plan    · Nephrology Following     Leukocytosisresolved as of 10/6/2018   Assessment & Plan    · Resolved  · Continues with bandemia   · WBC on admission: 20 74  · Current WBC: 9 9  · Will monitor with daily CBC     Pressure injury of right buttock, unstageable (HCC)   Assessment & Plan    · POA  · Continue Daily Wound Care  · Frequent Turning and Offloading     Seizure disorder ([address] )   Assessment & Plan    · Continue home medications:   · Vimpat     Down syndrome   Assessment & Plan    · At Baseline     Impulse control disorder   Assessment & Plan    · At Baseline  · Continue at home medication: Seroquel  · Agitation Control prn         Plan:    Neuro:   · Sedation plan: Propofol  · Wean As Tolerated  · RASS goal: 0 Alert and Calm  · Delirium: CAM ICU positive no   · Hx of Seizures; continue Vimpate  · Hx of Impulse Control Disorder: Seroquel  · Hx of Down Syndrome  · Regulate sleep/wake cycle    CV:   · Rhythm: NSR  · Follow rhythm on telemetry    Pulm:   · Continue Nebulizer Treatments  · Continue Ventilator Support  · Spontaneous Breathing Trial  · Attempted Extubation Today  · SBT plan:   · Chest PT ordered: yes   · Chlorhexidine ordered: yes   · HOB >30 degrees: yes     GI:   · Nutrition/diet plan: Tube Feeding  · Stress ulcer prophylaxis: Pepcid PO  · Bowel regimen: Miralax and Dulcolax Suppository  · Last BM: 10/9/18    FEN:   · Fluid/Diuretic plan: No intervention  · Goal 24 hour fluid balance: Euvolemic  · Electrolytes repleted: yes  · Goal: K >4 0, Mag >2 0, and Phos >3 0    :   · Chronic Suprapubic Cath  · Replaced on 10/8/18    ID:   · Abx ordered: Cefepime and Flagyl  · Day #8 Cefepime  · Day #7 Flagyl  · Trend temps and WBC count    Heme:   · Trend hgb and plts    Endo:   · Blood Glucose: 174  · Glycemic control plan: Blood glucose controlled on current regimen    Msk/Skin:  · Mobility goal: OOB  · PT consult: yes  · OT consult: yes  · Frequent turning and off-loading    Family:  · Family updated within 24 hours: yes   · Family meeting planned today: no     Lines:  · Central venous access: triple lumen catheter - 20 cm  · Keep central line today for hemodynamic monitoring  · Arterial line: Assessed  No longer needed  Order will be discontinued  VTE Prophylaxis:  · Pharmacologic Prophylaxis: Heparin  · Mechanical Prophylaxis: sequential compression device    Disposition: Continue ICU care    Code Status: Level 1 - Full Code    Counseling / Coordination of Care  Total Critical Care time spent 30 minutes excluding procedures, teaching and family updates  ______________________________________________________________________    HPI/24hr events:  Patient seen and examined at bedside  No acute events overnight  Suprapubic catheter replaced     ______________________________________________________________________    Physical Exam:   Physical Exam   Constitutional: He appears well-developed and well-nourished  No distress  HENT:   Head: Atraumatic  Microcephalic  Eyes: Pupils are equal, round, and reactive to light  Conjunctivae and EOM are normal    Neck: Normal range of motion  Neck supple  Cardiovascular: Normal rate, regular rhythm, normal heart sounds and intact distal pulses  Exam reveals no gallop and no friction rub  No murmur heard  Pulmonary/Chest: Effort normal and breath sounds normal  No respiratory distress  He has no wheezes  He has no rales  He exhibits no tenderness  Abdominal: Soft  Bowel sounds are normal  He exhibits no distension and no mass  There is no tenderness  There is no rebound and no guarding  No hernia  PEG   Genitourinary:   Genitourinary Comments: Suprapubic Catheter   Musculoskeletal: He exhibits deformity  Neurological: He exhibits abnormal muscle tone  Hx of Down Syndrome   Skin: Skin is warm  Capillary refill takes less than 2 seconds  He is not diaphoretic  No erythema  Nursing note and vitals reviewed        ______________________________________________________________________  Vitals:    10/09/18 1000 10/09/18 1021 10/09/18 1102 10/09/18 1116   BP: 97/54      BP Location:       Pulse: 63      Resp: 14      Temp:       TempSrc:       SpO2: 98% 99% 96% 94%   Weight:       Height:         Arterial Line BP: 108/48  Arterial Line MAP (mmHg): 68 mmHg     Temperature:   Temp (24hrs), Av 3 °F (36 3 °C), Min:96 7 °F (35 9 °C), Max:98 1 °F (36 7 °C)    Current Temperature: (!) 96 7 °F (35 9 °C)    Weights:   IBW: 63 8 kg    Body mass index is 25 55 kg/m²  Weight (last 2 days)     Date/Time   Weight    10/09/18 0319  71 8 (158 29)    10/08/18 0600  71 (156 53)    10/07/18 0600  69 (152 12)              Hemodynamic Monitoring:  N/A  CO:  [6 2 L/min-10 5 L/min] 6 2 L/min  CI:  [3 6 L/min/m2-6 2 L/min/m2] 3 6 L/min/m2    Non-Invasive/Invasive Ventilation Settings:  Respiratory    Lab Data (Last 4 hours)    None         O2/Vent Data (Last 4 hours)      10/09 1021 10/09 1102         Vent Mode CPAP/PS Spont CPAP/PS Spont      RSBI  13      FIO2 (%) (%) 40 40      PEEP (cmH2O) (cmH2O) 5 5      Pressure Support (cmH2O) (cmH20) 5 5      MV (Obs) 9 03 12 8      RSBI 13                 No results found for: PHART, YWT7WQO, PO2ART, JLF1MXN, D8LAGYFK, BEART, SOURCE  SpO2: SpO2: 94 %    Intake and Outputs:  I/O       10/07 0701 - 10/08 0700 10/08 0701 - 10/09 0700 10/09 0701 - 10/10 07    I V  (mL/kg) 223 (3 1) 220 9 (3 1)     NG/ 750     IV Piggyback 400 356 7     Feedings 1200 600     Total Intake(mL/kg) 2753 (38 8) 1927 5 (26 8)     Urine (mL/kg/hr) 1095 (0 6) 1460 (0 8) 185 (1 6)    Total Output 1095 1460 185    Net +1658 +467 5 -185           Unmeasured Stool Occurrence  1 x        Nutrition:        Diet Orders            Start     Ordered    10/08/18 2216  Diet Enteral/Parenteral; Tube Feeding No Oral Diet; Jevity 1 5; Continuous; 50; 150;  Water; Every 4 hours  Diet effective now     Question Answer Comment   Diet Type Enteral/Parenteral    Enteral/Parenteral Tube Feeding No Oral Diet    Tube Feeding Formula: Jevity 1 5    Bolus/Cyclic/Continuous Continuous    Tube Feeding Goal Rate (mL/hr): 50    Tube Feeding water flush (mL): 150    Water Flush type: Water    Water flush frequency: Every 4 hours    RD to adjust diet per protocol? Yes        10/08/18 2215    10/02/18 1926  Room Service  Once     Question:  Type of Service  Answer:  Room Service- Not Appropriate    10/02/18 1925          Labs:     Results from last 7 days  Lab Units 10/09/18  0453 10/08/18  0520 10/07/18  0545   WBC Thousand/uL 9 90 17 14* 16 74*   HEMOGLOBIN g/dL 7 1* 7 7* 7 5*   HEMATOCRIT % 22 0* 24 1* 22 7*   PLATELETS Thousands/uL 62* 88* 80*   MONO PCT MAN % 5 8 8       Results from last 7 days  Lab Units 10/09/18  0452 10/08/18  0520 10/07/18  0545  10/05/18  1836   SODIUM mmol/L 144 141 141  < > 141   POTASSIUM mmol/L 4 2 4 6 4 0  < > 4 2   CHLORIDE mmol/L 108 106 105  < > 106   CO2 mmol/L 28 25 25  < > 26   BUN mg/dL 92* 85* 73*  < > 61*   CREATININE mg/dL 2 97* 3 35* 3 40*  < > 2 99*   CALCIUM mg/dL 8 0* 8 0* 8 2*  < > 8 2*   ALK PHOS U/L  --  121* 131*  --  142*   ALT U/L  --  16 18  --  21   AST U/L  --  16 20  --  25   < > = values in this interval not displayed  Results from last 7 days  Lab Units 10/09/18  0452 10/08/18  0520 10/07/18  0545   MAGNESIUM mg/dL 2 1 2 2 2 3     Lab Results   Component Value Date    PHOS 6 5 (H) 10/09/2018    PHOS 6 4 (H) 10/08/2018    PHOS 5 8 (H) 10/07/2018        Results from last 7 days  Lab Units 10/02/18  1156   INR  1 25*   PTT seconds 30     No results found for: TROPONINI    Results from last 7 days  Lab Units 10/05/18  0350 10/02/18  1400 10/02/18  1155   LACTIC ACID mmol/L 1 0 1 4 3 3*     ABG:  Lab Results   Component Value Date    PHART 7 371 10/06/2018    RCH9FUY 45 0 (H) 10/06/2018    PO2ART 77 9 10/06/2018    YZU2LMS 25 5 10/06/2018    BEART 0 1 10/06/2018    SOURCE Line, Arterial 10/05/2018       Imaging: CXR:  I have personally reviewed pertinent reports  ECHO:  I have personally reviewed pertinent reports          Micro:  Lab Results   Component Value Date    BLOODCX Proteus mirabilis (A) 10/02/2018    BLOODCX Morganella morganii (A) 10/02/2018    BLOODCX Proteus mirabilis (A) 10/02/2018    BLOODCX Morganella morganii (A) 10/02/2018    URINECX >100,000 cfu/ml Escherichia coli (A) 10/02/2018    URINECX >100,000 cfu/ml Morganella morganii (A) 10/02/2018    URINECX (A) 10/02/2018     >100,000 cfu/ml Beta Hemolytic Streptococcus Group G    SPUTUMCULTUR 1+ Growth of  10/05/2018       Allergies: No Known Allergies  Medications:   Scheduled Meds:    Current Facility-Administered Medications:  acetylcysteine 3 mL Nebulization Q6H While awake Laci Aguilera MD    bisacodyl 10 mg Rectal Daily PRN Terrence Payne MD    cefepime 1,000 mg Intravenous Q24H Terrence Payne MD Last Rate: Stopped (10/08/18 1345)   chlorhexidine 15 mL Swish & Spit Q12H [facility] Corby Faust PA-C    famotidine 20 mg Per G Tube Daily Mara Gonzalez PA-C    heparin (porcine) 5,000 Units Subcutaneous Q8H [facility] Terrence Payne MD    insulin lispro 1-5 Units Subcutaneous Q6H [facility] REMEDIOS Jaimes    ipratropium-albuterol 3 mL Nebulization Q6H Silver Montero MD    lacosamide 100 mg Per G Tube Q12H Blas Frost MD    lactobacillus acidophilus-bulgaricus 1 packet Oral BID Terrence Payne MD    metroNIDAZOLE 500 mg Intravenous Q8H Neetu Melvin PA-C Last Rate: Stopped (10/09/18 0540)   polyethylene glycol 17 g Per G Tube Daily Terrence Payne MD    QUEtiapine 25 mg Per Kevin Rocha MD    QUEtiapine 50 mg Per G Tube HS Terrence Payne MD      Continuous Infusions:     PRN Meds:    bisacodyl 10 mg Daily PRN       Invasive lines and devices:   Invasive Devices     Central Venous Catheter Line            CVC Central Lines 10/05/18 Triple 20cm 3 days          Peripheral Intravenous Line            Long-Dwell Peripheral IV (Midline) 90/90/82 Right Basilic 5 days          Arterial Line            Arterial Line 10/05/18 Right Radial 3 days          Drain            Gastrostomy/Enterostomy Percutaneous endoscopic gastrostomy (PEG) LLQ -- days    Urethral Catheter 18 Fr  -- days    Suprapubic Catheter Latex; Double-lumen 16 Fr  less than 1 day                   SIGNATURE: Meghan Logan MD  DATE: October 9, 2018

## 2018-10-09 NOTE — PROGRESS NOTES
Tolerating TF at ordered goal rate  Noted phosphorus trending up  Could provide alternate formula that is lower in phosphorus  Goal rate of current ordered formula Jevity 1 5 provides 1440 mg phophorus  If Nepro provided at 40 ml/hr goal rate, will provide similar amount of calories and protein but only 691 mg phosphorus  Nepro @ 40 ml/hr also only provides 701 ml free water, may require increase in free water flushes from 150 ml Q4HR to 175 ml Q4HR  Will continue to monitor

## 2018-10-10 ENCOUNTER — APPOINTMENT (INPATIENT)
Dept: RADIOLOGY | Facility: HOSPITAL | Age: 53
DRG: 871 | End: 2018-10-10
Payer: MEDICARE

## 2018-10-10 PROBLEM — A41.9 SEPTIC SHOCK (HCC): Status: RESOLVED | Noted: 2018-10-02 | Resolved: 2018-10-10

## 2018-10-10 PROBLEM — E87.0 HYPERNATREMIA: Status: ACTIVE | Noted: 2018-10-10

## 2018-10-10 PROBLEM — R65.21 SEPTIC SHOCK (HCC): Status: RESOLVED | Noted: 2018-10-02 | Resolved: 2018-10-10

## 2018-10-10 PROBLEM — D69.6 THROMBOCYTOPENIA (HCC): Status: ACTIVE | Noted: 2018-10-10

## 2018-10-10 LAB
ANION GAP SERPL CALCULATED.3IONS-SCNC: 6 MMOL/L (ref 4–13)
ANISOCYTOSIS BLD QL SMEAR: PRESENT
ATRIAL RATE: 59 BPM
ATRIAL RATE: 82 BPM
BASO STIPL BLD QL SMEAR: PRESENT
BASOPHILS # BLD MANUAL: 0 THOUSAND/UL (ref 0–0.1)
BASOPHILS NFR MAR MANUAL: 0 % (ref 0–1)
BUN SERPL-MCNC: 93 MG/DL (ref 5–25)
CALCIUM SERPL-MCNC: 7.9 MG/DL (ref 8.3–10.1)
CHLORIDE SERPL-SCNC: 111 MMOL/L (ref 100–108)
CO2 SERPL-SCNC: 31 MMOL/L (ref 21–32)
CREAT SERPL-MCNC: 2.4 MG/DL (ref 0.6–1.3)
EOSINOPHIL # BLD MANUAL: 0.1 THOUSAND/UL (ref 0–0.4)
EOSINOPHIL NFR BLD MANUAL: 1 % (ref 0–6)
ERYTHROCYTE [DISTWIDTH] IN BLOOD BY AUTOMATED COUNT: 17.5 % (ref 11.6–15.1)
GFR SERPL CREATININE-BSD FRML MDRD: 29 ML/MIN/1.73SQ M
GLUCOSE SERPL-MCNC: 103 MG/DL (ref 65–140)
GLUCOSE SERPL-MCNC: 115 MG/DL (ref 65–140)
GLUCOSE SERPL-MCNC: 79 MG/DL (ref 65–140)
GLUCOSE SERPL-MCNC: 89 MG/DL (ref 65–140)
GLUCOSE SERPL-MCNC: 95 MG/DL (ref 65–140)
HCT VFR BLD AUTO: 22.8 % (ref 36.5–49.3)
HGB BLD-MCNC: 7.3 G/DL (ref 12–17)
LYMPHOCYTES # BLD AUTO: 1.7 THOUSAND/UL (ref 0.6–4.47)
LYMPHOCYTES # BLD AUTO: 17 % (ref 14–44)
MACROCYTES BLD QL AUTO: PRESENT
MAGNESIUM SERPL-MCNC: 2 MG/DL (ref 1.6–2.6)
MCH RBC QN AUTO: 34.1 PG (ref 26.8–34.3)
MCHC RBC AUTO-ENTMCNC: 32 G/DL (ref 31.4–37.4)
MCV RBC AUTO: 107 FL (ref 82–98)
MONOCYTES # BLD AUTO: 0.5 THOUSAND/UL (ref 0–1.22)
MONOCYTES NFR BLD: 5 % (ref 4–12)
MYELOCYTES NFR BLD MANUAL: 2 % (ref 0–1)
NEUTROPHILS # BLD MANUAL: 7.5 THOUSAND/UL (ref 1.85–7.62)
NEUTS BAND NFR BLD MANUAL: 24 % (ref 0–8)
NEUTS SEG NFR BLD AUTO: 51 % (ref 43–75)
NRBC BLD AUTO-RTO: 0 /100 WBCS
NRBC BLD AUTO-RTO: 1 /100 WBC (ref 0–2)
P AXIS: 47 DEGREES
P AXIS: 49 DEGREES
PHOSPHATE SERPL-MCNC: 5.9 MG/DL (ref 2.7–4.5)
PLATELET # BLD AUTO: 75 THOUSANDS/UL (ref 149–390)
PLATELET BLD QL SMEAR: ABNORMAL
PMV BLD AUTO: 11.3 FL (ref 8.9–12.7)
POLYCHROMASIA BLD QL SMEAR: PRESENT
POTASSIUM SERPL-SCNC: 3.9 MMOL/L (ref 3.5–5.3)
PR INTERVAL: 138 MS
PR INTERVAL: 160 MS
QRS AXIS: 46 DEGREES
QRS AXIS: 78 DEGREES
QRSD INTERVAL: 106 MS
QRSD INTERVAL: 94 MS
QT INTERVAL: 324 MS
QT INTERVAL: 422 MS
QTC INTERVAL: 378 MS
QTC INTERVAL: 417 MS
RBC # BLD AUTO: 2.14 MILLION/UL (ref 3.88–5.62)
RBC MORPH BLD: PRESENT
SODIUM SERPL-SCNC: 148 MMOL/L (ref 136–145)
T WAVE AXIS: 34 DEGREES
T WAVE AXIS: 57 DEGREES
TARGETS BLD QL SMEAR: PRESENT
TOTAL CELLS COUNTED SPEC: 100
VENTRICULAR RATE: 59 BPM
VENTRICULAR RATE: 82 BPM
WBC # BLD AUTO: 10 THOUSAND/UL (ref 4.31–10.16)

## 2018-10-10 PROCEDURE — 94668 MNPJ CHEST WALL SBSQ: CPT

## 2018-10-10 PROCEDURE — 84100 ASSAY OF PHOSPHORUS: CPT | Performed by: NURSE PRACTITIONER

## 2018-10-10 PROCEDURE — 93010 ELECTROCARDIOGRAM REPORT: CPT | Performed by: INTERNAL MEDICINE

## 2018-10-10 PROCEDURE — G8979 MOBILITY GOAL STATUS: HCPCS

## 2018-10-10 PROCEDURE — 80048 BASIC METABOLIC PNL TOTAL CA: CPT | Performed by: NURSE PRACTITIONER

## 2018-10-10 PROCEDURE — 99232 SBSQ HOSP IP/OBS MODERATE 35: CPT | Performed by: INTERNAL MEDICINE

## 2018-10-10 PROCEDURE — G8978 MOBILITY CURRENT STATUS: HCPCS

## 2018-10-10 PROCEDURE — 82948 REAGENT STRIP/BLOOD GLUCOSE: CPT

## 2018-10-10 PROCEDURE — 94760 N-INVAS EAR/PLS OXIMETRY 1: CPT

## 2018-10-10 PROCEDURE — 97167 OT EVAL HIGH COMPLEX 60 MIN: CPT

## 2018-10-10 PROCEDURE — 94669 MECHANICAL CHEST WALL OSCILL: CPT

## 2018-10-10 PROCEDURE — 94640 AIRWAY INHALATION TREATMENT: CPT

## 2018-10-10 PROCEDURE — 93005 ELECTROCARDIOGRAM TRACING: CPT

## 2018-10-10 PROCEDURE — 71045 X-RAY EXAM CHEST 1 VIEW: CPT

## 2018-10-10 PROCEDURE — G8987 SELF CARE CURRENT STATUS: HCPCS

## 2018-10-10 PROCEDURE — 99233 SBSQ HOSP IP/OBS HIGH 50: CPT | Performed by: ANESTHESIOLOGY

## 2018-10-10 PROCEDURE — 83735 ASSAY OF MAGNESIUM: CPT | Performed by: NURSE PRACTITIONER

## 2018-10-10 PROCEDURE — 85007 BL SMEAR W/DIFF WBC COUNT: CPT | Performed by: NURSE PRACTITIONER

## 2018-10-10 PROCEDURE — G8988 SELF CARE GOAL STATUS: HCPCS

## 2018-10-10 PROCEDURE — 85027 COMPLETE CBC AUTOMATED: CPT | Performed by: NURSE PRACTITIONER

## 2018-10-10 PROCEDURE — 97163 PT EVAL HIGH COMPLEX 45 MIN: CPT

## 2018-10-10 RX ORDER — POTASSIUM CHLORIDE 20MEQ/15ML
20 LIQUID (ML) ORAL ONCE
Status: COMPLETED | OUTPATIENT
Start: 2018-10-10 | End: 2018-10-10

## 2018-10-10 RX ORDER — FUROSEMIDE 10 MG/ML
20 INJECTION INTRAMUSCULAR; INTRAVENOUS ONCE
Status: COMPLETED | OUTPATIENT
Start: 2018-10-10 | End: 2018-10-10

## 2018-10-10 RX ORDER — HALOPERIDOL 5 MG/ML
5 INJECTION INTRAMUSCULAR ONCE
Status: DISCONTINUED | OUTPATIENT
Start: 2018-10-10 | End: 2018-10-11 | Stop reason: SDUPTHER

## 2018-10-10 RX ADMIN — IPRATROPIUM BROMIDE AND ALBUTEROL SULFATE 3 ML: .5; 3 SOLUTION RESPIRATORY (INHALATION) at 19:14

## 2018-10-10 RX ADMIN — FUROSEMIDE 20 MG: 10 INJECTION, SOLUTION INTRAMUSCULAR; INTRAVENOUS at 11:07

## 2018-10-10 RX ADMIN — LACTOBACILLUS ACIDOPHILUS / LACTOBACILLUS BULGARICUS 1 PACKET: 100 MILLION CFU STRENGTH GRANULES at 17:32

## 2018-10-10 RX ADMIN — LACOSAMIDE 100 MG: 50 TABLET, FILM COATED ORAL at 21:09

## 2018-10-10 RX ADMIN — IPRATROPIUM BROMIDE AND ALBUTEROL SULFATE 3 ML: .5; 3 SOLUTION RESPIRATORY (INHALATION) at 14:44

## 2018-10-10 RX ADMIN — QUETIAPINE 25 MG: 25 TABLET ORAL at 08:05

## 2018-10-10 RX ADMIN — Medication 20 MG: at 08:06

## 2018-10-10 RX ADMIN — HEPARIN SODIUM 5000 UNITS: 5000 INJECTION, SOLUTION INTRAVENOUS; SUBCUTANEOUS at 05:01

## 2018-10-10 RX ADMIN — MELATONIN TAB 3 MG 6 MG: 3 TAB at 21:09

## 2018-10-10 RX ADMIN — HEPARIN SODIUM 5000 UNITS: 5000 INJECTION, SOLUTION INTRAVENOUS; SUBCUTANEOUS at 21:09

## 2018-10-10 RX ADMIN — POTASSIUM CHLORIDE 20 MEQ: 20 SOLUTION ORAL at 08:05

## 2018-10-10 RX ADMIN — IPRATROPIUM BROMIDE AND ALBUTEROL SULFATE 3 ML: .5; 3 SOLUTION RESPIRATORY (INHALATION) at 01:32

## 2018-10-10 RX ADMIN — IPRATROPIUM BROMIDE AND ALBUTEROL SULFATE 3 ML: .5; 3 SOLUTION RESPIRATORY (INHALATION) at 07:17

## 2018-10-10 RX ADMIN — LACOSAMIDE 100 MG: 50 TABLET, FILM COATED ORAL at 08:05

## 2018-10-10 RX ADMIN — HEPARIN SODIUM 5000 UNITS: 5000 INJECTION, SOLUTION INTRAVENOUS; SUBCUTANEOUS at 13:27

## 2018-10-10 RX ADMIN — LACTOBACILLUS ACIDOPHILUS / LACTOBACILLUS BULGARICUS 1 PACKET: 100 MILLION CFU STRENGTH GRANULES at 08:05

## 2018-10-10 NOTE — ASSESSMENT & PLAN NOTE
· Patient's blood cultures positive x2 for Proteus mirabilis and Morganella morganii  · Patient completed 7 day course of cefepime

## 2018-10-10 NOTE — ASSESSMENT & PLAN NOTE
· Patient's sodium level increased to 148 today    · Will increase free water flushes to 300 mL q 4 hours  · Nephrology recommendations appreciated

## 2018-10-10 NOTE — PROGRESS NOTES
Daily Progress Note - Critical Care/ Stepdown   Josef Ziegler 52 y o  male MRN: [id number]  Unit/Bed#: ICU 04 Encounter: [id number]    ______________________________________________________________________  Assessment:   Principal Problem (Resolved):    Septic shock ([facility] [address] )  Active Problems:    Pneumonia of right upper lobe due to infectious organism [facility])    Acute respiratory failure with hypoxia ([facility] [address] )    Acute kidney injury ([facility] [address] )    Urinary tract infection associated with catheterization of urinary tract (HCC)    ATN (acute tubular necrosis) ([facility])    Gram-negative bacteremia    Toxic metabolic encephalopathy    Anemia    Hypernatremia    Pressure injury of right buttock, unstageable (HCC)    Seizure disorder ([facility])    Impulse control disorder    Down syndrome  Resolved Problems:    Leukocytosis      * Septic shock ([facility])resolved as of 10/10/2018   Assessment & Plan    · Admitted 10/2 with sepsis secondary to UTI and Pneumonia  · Improved and transferred to the floor on 10/4 , and return early morning 10/5 with hypoxia and hypotension  · White count continues to improve  Procalcitonin improving  · Became hypotensive  after intubation  Required high dose pressors and steroids were added  Started on high-dose vitamin C and thiamine  · Echo on 10/5/18: EF: 60%, no significant valvular dysfunction, no findings that would suggest moderate or severe pulmonary hypertension    · Procalcitonin on admission:  · Current Procalcitonin: 1 09  · BCX x2: Proteus  · Sputum cx unimpressive  · Influenza A/B: Negative  · UCx:  E coli, Morganella morganii, beta-hemolytic Streptococcus group G  · Repeat CXR on 10/8/18:  · Worsening bilateral infiltrates, left side greater than right, and moderate-sized right pleural effusion  · Pressors have been weaned off  · Completed 7 day course of cefepime  · Patient was extubated yesterday     Acute respiratory failure with hypoxia ([facility] [address] )   Assessment & Plan    · Pt was intubated 10/5 for increase WOB and hypoxia  Extubated yesterday to nasal canula  · Sputum culture: Mixed Respiratory Luann  · Repeat CXR on 10/8/18:  · Worsening bilateral infiltrates, left side greater than right, and moderate-sized right pleural effusion  · Repeat xray pending   · Pt currently tolerating 6L NC  Pneumonia of right upper lobe due to infectious organism [facility])   Assessment & Plan    · Repeat CXR on 10/8/18:  · Worsening bilateral infiltrates, left side greater than right, and moderate-sized right pleural effusion  · Procalcitonin: 1 09  · Influenza A/B: Negative  · BCx x2: Proteus  · Sputum CX/GS positive for respiratory luann  · Pt completed course of antibiotics  Acute kidney injury [facility])   Assessment & Plan    · Likely secondary to acute illness/septic shock  · Baseline Cr: 0 9-1  · Cr on admission: 2 79 peaked 3 4  · Current Cr: 2 40 today  · Will monitor with daily CMP  · Diuresed with Lasix on 10/5 and 10/6 due to concern for volume overload  · May benefit form additional diuresis  · Nephrology Recommendations Appreciated     Urinary tract infection associated with catheterization of urinary tract [facility])   Assessment & Plan    · Pt with chronic suprapubic catheter  · Catheter exchanged on 10/8  · UA on admission:   · + Leukocytes, Large Blood, Innumerable WBC/Bacteria  · UCx with E coli  · Pt completed 7 day course of cefepime     Gram-negative bacteremia   Assessment & Plan    · Patient's blood cultures positive x2 for Proteus mirabilis and Morganella morganii  · Patient completed 7 day course of cefepime     ATN (acute tubular necrosis) ([facility] [address] )   Assessment & Plan    · Nephrology Following     Leukocytosisresolved as of 10/6/2018   Assessment & Plan    · Resolved  · Continues with bandemia   · WBC on admission: 20 74  · Current WBC: 9 9  · Will monitor with daily CBC     Toxic metabolic encephalopathy   Assessment & Plan    · Unsure of pt baseline status   Will call [facility] and speak to staff get a better idea of his baseline activity level  Hypernatremia   Assessment & Plan    · Patient's sodium level increased to 148 today  · Will increase free water flushes to 250 mL q 4 hours  · Nephrology recommendations appreciated     Anemia   Assessment & Plan    · Unclear whether this is acute versus chronic issue  · Patient's hemoglobin has been stable in the 7s  · Will continue monitor and trend  · Consider additional workup once acute illness resolved  Thrombocytopenia ([facility] [address] )   Assessment & Plan    · Platelets 044 on admission  · Down trended to 62 yesterday and 75 today    · Famotidine discontinued  · Will continue to monitor and trend     Pressure injury of right buttock, unstageable (HCC)   Assessment & Plan    · POA  · Continue Daily Wound Care  · Frequent Turning and Offloading     Seizure disorder ([facility] [address] )   Assessment & Plan    · Continue home medications:   · Vimpat  · Lacosamide level 16 9     Down syndrome   Assessment & Plan    · At Baseline     Impulse control disorder   Assessment & Plan    · At Baseline  · Continue at home medication: Seroquel  · Agitation Control prn         Plan:    Neuro:   · Delirium: CAM ICU positive yes   · If yes, intervention:  Environmental controls/supportive care  · Pain controlled with: NA  · Pain score: none  · Regulate sleep/wake cycle    CV:   · Rhythm: NSR  · Follow rhythm on telemetry    Pulm:   · Encourage aggressive pulmonary toilet  · Aspiration precautions  · Chest PT ordered: yes   · Chlorhexidine ordered: no   · HOB >30 degrees: yes     GI:   · Nutrition/diet plan:  Continue tube feedings  · Stress ulcer prophylaxis: Omeprazole PO  · Bowel regimen: Dulcolax Suppository  · Last BM: 10/9 medium soft brown    FEN:   · Fluid/Diuretic plan: Needs diuresis lasix IV  · Goal 24 hour fluid balance:  Net negative  · Electrolytes repleted: yes  · Goal: K >4 0, Mag >2 0, and Phos >3 0    :   · Indwelling Godoy present:  Chronic Suprapubic catheter intact ID: · Abx ordered: Cefepime  · Completed 7 day course  · Trend temps and WBC count    Heme:   · Hemoglobin stable at 7 3  · Trend hgb and plts    Endo:   · Glycemic control plan: Blood glucose controlled on current regimen    Msk/Skin:  · Mobility goal:  Maintain skin integrity  · PT consult: yes  · OT consult: yes  · Frequent turning and off-loading    Family:  · Family updated within 24 hours:  Lester updated yesterday   · Family meeting planned today: no     VTE Prophylaxis:  · Pharmacologic Prophylaxis: Heparin  · Mechanical Prophylaxis: sequential compression device    Disposition: Continue Stepdown    Code Status: Level 1 - Full Code    Counseling / Coordination of Care  Total time spent today 32 minutes  Greater than 50% of total time was spent with the patient and / or family counseling and / or coordination of care  A description of the counseling / coordination of care: As discussed above and with care team  ______________________________________________________________________    HPI/24hr events:   No acute events overnight  Patient received 1 dose of Haldol IV for agitation  Patient is tolerating 6 L nasal cannula  On exam this morning he is lethargic he does not follow commands and he is nonverbal   Per nursing team patient is purposeful at times  ______________________________________________________________________    Physical Exam:   Physical Exam   Constitutional: He appears well-developed and well-nourished  He appears lethargic  He has a sickly appearance  Nasal cannula in place  HENT:   Head: Normocephalic and atraumatic  Mouth/Throat: Oropharynx is clear and moist and mucous membranes are normal    Eyes: Pupils are equal, round, and reactive to light  EOM are normal    Neck: Trachea normal    Scar noted   Cardiovascular: Regular rhythm, normal heart sounds and intact distal pulses  Pulmonary/Chest: He has decreased breath sounds   He has rhonchi in the right upper field and the left upper field  Abdominal: Soft  Bowel sounds are normal  There is no tenderness  G-tube intact some skin excoriation   Genitourinary:   Genitourinary Comments: Suprapubic catheter intact   Neurological: He appears lethargic  Moves all extremities  Does not follow commands  Is nonverbal    Skin: Skin is warm and dry  Capillary refill takes less than 2 seconds  There is erythema        ______________________________________________________________________  Vitals:    10/10/18 0600 10/10/18 0700 10/10/18 0717 10/10/18 08   BP: 120/59 112/58  109/54   BP Location:    Left arm   Pulse: 80 71  74   Resp: 21 12  13   Temp:    97 8 °F (36 6 °C)   TempSrc:    Temporal   SpO2: 94% 95% 92% 95%   Weight:       Height:         Arterial Line BP: 114/56  Arterial Line MAP (mmHg): 76 mmHg     Temperature:   Temp (24hrs), Av 5 °F (36 4 °C), Min:97 1 °F (36 2 °C), Max:97 8 °F (36 6 °C)    Current Temperature: 97 8 °F (36 6 °C)    Weights:   IBW: 63 8 kg    Body mass index is 25 55 kg/m²    Weight (last 2 days)     Date/Time   Weight    10/09/18 0319  71 8 (158 29)    10/08/18 0600  71 (156 53)              Hemodynamic Monitoring:  N/A       Non-Invasive/Invasive Ventilation Settings:  Respiratory    Lab Data (Last 4 hours)    None         O2/Vent Data (Last 4 hours)    None              No results found for: PHART, DIX0RIF, PO2ART, PMH3NRA, X8EYRDAH, BEART, SOURCE  SpO2: SpO2: 93 %    Intake and Outputs:  I/O       10/08 0701 - 10/09 0700 10/09 0701 - 10/10 0700 10/10 0701 - 10/11 07    I V  (mL/kg) 220 9 (3 1) 135 6 (1 9)     NG/ 1000     IV Piggyback 356 7      Feedings 600 950     Total Intake(mL/kg) 1927 5 (26 8) 2085 6 (29)     Urine (mL/kg/hr) 1460 (0 8) 1360 (0 8)     Total Output 1460 1360      Net +467 5 +725 6             Unmeasured Stool Occurrence 1 x            Nutrition:        Diet Orders            Start     Ordered    10/08/18 2216  Diet Enteral/Parenteral; Tube Feeding No Oral Diet; Jevity 1 5; Continuous; 50; 150; Water; Every 4 hours  Diet effective now     Question Answer Comment   Diet Type Enteral/Parenteral    Enteral/Parenteral Tube Feeding No Oral Diet    Tube Feeding Formula: Jevity 1 5    Bolus/Cyclic/Continuous Continuous    Tube Feeding Goal Rate (mL/hr): 50    Tube Feeding water flush (mL): 150    Water Flush type: Water    Water flush frequency: Every 4 hours    RD to adjust diet per protocol? Yes        10/08/18 2215    10/02/18 1926  Room Service  Once     Question:  Type of Service  Answer:  Room Service- Not Appropriate    10/02/18 1925        TF currently running at 50 cc/hour with a goal of 50 cc/hr  Formula:  Jevity 1 5    Labs:     Results from last 7 days  Lab Units 10/10/18  0436 10/09/18  0453 10/08/18  0520   WBC Thousand/uL 10 00 9 90 17 14*   HEMOGLOBIN g/dL 7 3* 7 1* 7 7*   HEMATOCRIT % 22 8* 22 0* 24 1*   PLATELETS Thousands/uL 75* 62* 88*   MONO PCT MAN % 5 5 8       Results from last 7 days  Lab Units 10/10/18  0436 10/09/18  0452 10/08/18  0520 10/07/18  0545  10/05/18  1836   SODIUM mmol/L 148* 144 141 141  < > 141   POTASSIUM mmol/L 3 9 4 2 4 6 4 0  < > 4 2   CHLORIDE mmol/L 111* 108 106 105  < > 106   CO2 mmol/L 31 28 25 25  < > 26   BUN mg/dL 93* 92* 85* 73*  < > 61*   CREATININE mg/dL 2 40* 2 97* 3 35* 3 40*  < > 2 99*   CALCIUM mg/dL 7 9* 8 0* 8 0* 8 2*  < > 8 2*   ALK PHOS U/L  --   --  121* 131*  --  142*   ALT U/L  --   --  16 18  --  21   AST U/L  --   --  16 20  --  25   < > = values in this interval not displayed      Results from last 7 days  Lab Units 10/10/18  0436 10/09/18  0452 10/08/18  0520   MAGNESIUM mg/dL 2 0 2 1 2 2     Lab Results   Component Value Date    PHOS 5 9 (H) 10/10/2018    PHOS 6 5 (H) 10/09/2018    PHOS 6 4 (H) 10/08/2018          No results found for: TROPONINI    Results from last 7 days  Lab Units 10/05/18  0350   LACTIC ACID mmol/L 1 0     ABG:  Lab Results   Component Value Date    PHART 7 371 10/06/2018    OJA2SIN 45 0 (H) 10/06/2018    PO2ART 77 9 10/06/2018    QYZ2FDQ 25 5 10/06/2018    BEART 0 1 10/06/2018    SOURCE Line, Arterial 10/05/2018       Imaging:   XR chest portable   Final Result      Bilateral pneumonia  Right pleural effusion  Workstation performed: [id number]         XR chest portable   Final Result   Worsening bilateral infiltrates, left side greater than right, and moderate-sized right pleural effusion  Workstation performed: [id number]         XR chest portable   Final Result   1  Slight progression of bilateral infiltrates  2   Slight improvement in right pleural effusion  Workstation performed: [id number]         XR chest portable ICU   Final Result   1  Left internal jugular central venous catheter is to the left of the spine  Please see follow-up chest radiograph report  2   Bilateral lung opacities as above  Workstation performed: [id number]         XR chest portable   Final Result   1  Left internal jugular central venous catheter is either within a duplicated SVC or within the arterial system  I discussed this with Lorene Ravi 10/5/2018 at approximately 8:40 PM    2   Large right pleural effusion is unchanged  Left lung opacity is unchanged  Workstation performed: [id number]         XR chest portable   Final Result   Endotracheal tube tip projects over the trachea just above the wilda  Increasing right-sided pleural effusion with persistent left-sided airspace opacities noted  Better upright repeat film suggested  Workstation performed: [id number]         US kidney and bladder   Final Result      1   Echogenic kidneys consistent with chronic medical renal disease  2   Bilateral nonobstructing renal calculi  3   Small right renal cyst       4   Diffusely thick walled bladder though completely collapsed  Correlation for cystitis recommended  5   Mild free fluid        Workstation performed: [id number]         XR chest portable   Final Result      Prominence of the cardiac silhouette with increasing perihilar airspace opacities and bilateral pleural effusions/atelectasis, suspect worsening fluid overload/CHF  Superimposed infection could be considered in the appropriate clinical setting  No other significant interval change  Workstation performed: [id number]         XR chest portable   Final Result      Stable right-sided pneumonia with parapneumonic effusion  New left-sided opacity which may also be infectious  The study was marked in [facility] for immediate notification  Workstation performed: [id number]         XR chest portable - 1 view   Final Result      Diffuse pneumonia in the right lung  Suspected right-sided pleural effusion  Workstation performed: [id number]         XR chest portable    (Results Pending)      I have personally reviewed pertinent reports        EKG:  Normal sinus rhythm on monitor    Micro:  Lab Results   Component Value Date    BLOODCX Proteus mirabilis (A) 10/02/2018    BLOODCX Morganella morganii (A) 10/02/2018    BLOODCX Proteus mirabilis (A) 10/02/2018    BLOODCX Morganella morganii (A) 10/02/2018    URINECX >100,000 cfu/ml Escherichia coli (A) 10/02/2018    URINECX >100,000 cfu/ml Morganella morganii (A) 10/02/2018    URINECX (A) 10/02/2018     >100,000 cfu/ml Beta Hemolytic Streptococcus Group G    SPUTUMCULTUR 1+ Growth of  10/05/2018       Allergies: No Known Allergies  Medications:   Scheduled Meds:  Current Facility-Administered Medications:  bisacodyl 10 mg Rectal Daily PRN Cathleen Bonner MD   haloperidol lactate 5 mg Intravenous Once REMEDIOS Andres   heparin (porcine) 5,000 Units Subcutaneous Q8H Coby Recinos MD   insulin lispro 1-5 Units Subcutaneous Q6H [facility] REMEDIOS Jaimes   ipratropium-albuterol 3 mL Nebulization Q6H Vanessa Lake MD   lacosamide 100 mg Per G Tube Q12H Coby Recinos MD   lactobacillus acidophilus-bulgaricus 1 packet Oral BID Sidney Wolf MD   melatonin 6 mg Oral HS REMEDIOS Andres   omeprazole (PRILOSEC) suspension 2 mg/mL 20 mg Oral Daily Sidney Wolf MD   polyethylene glycol 17 g Per G Tube Daily PRN Sidney Wolf MD   QUEtiapine 25 mg Per Roslyncynthia Fritz MD   QUEtiapine 50 mg Per G Tube HS Sidney Wolf MD     Continuous Infusions:   PRN Meds:    bisacodyl 10 mg Daily PRN   polyethylene glycol 17 g Daily PRN       Invasive lines and devices: Invasive Devices     Peripheral Intravenous Line            Long-Dwell Peripheral IV (Midline) 99/31/25 Right Basilic 6 days          Drain            Gastrostomy/Enterostomy Percutaneous endoscopic gastrostomy (PEG) LLQ -- days    Urethral Catheter 18 Fr  -- days    Suprapubic Catheter Latex; Double-lumen 16 Fr  1 day                   SIGNATURE: Niles Clear, CRNP  DATE: October 10, 2018

## 2018-10-10 NOTE — ASSESSMENT & PLAN NOTE
· Unclear whether this is acute versus chronic issue  · Patient's hemoglobin has been stable in the 7s  · Will continue monitor and trend  · Consider additional workup once acute illness resolved

## 2018-10-10 NOTE — ASSESSMENT & PLAN NOTE
· Unsure of pt baseline status  Discussion with [facility] staff via telephone conversation: Pt is interactive, but mostly nonverbal  Will yell out things like, "shut up" and "ok", but does not converse  Pt can be combative at times  · Pt is more awake and interactive today   Will continue to hold Seroquel and add back

## 2018-10-10 NOTE — ASSESSMENT & PLAN NOTE
· Platelets 325 on admission  · Improved to 90 today  · Famotidine discontinued  · Will continue to monitor and trend

## 2018-10-10 NOTE — PHYSICAL THERAPY NOTE
PT EVALUATION       10/10/18 1400   Pain Assessment   Pain Assessment Sims-Baker FACES   Sims-Baker FACES Pain Rating 0   Home Living   Type of Home ([facility])   Prior Function   Level of [place] Needs assistance with ADLs and functional mobility; Needs assistance with IADLs   Lives With Facility staff   Receives Help From (facility staff)   ADL Assistance Needs assistance   IADLs Needs assistance   Restrictions/Precautions   Other Precautions Fall Risk;Multiple lines; Chair Alarm; Bed Alarm;Cognitive;Contact/isolation   General   Additional Pertinent History Downs Syndrome, dysphasia: Peg tube, seizure   Cognition   Overall Cognitive Status Impaired   Arousal/Participation Cooperative   Following Commands Follows one step commands with increased time or repetition   RLE Assessment   RLE Assessment WFL   LLE Assessment   LLE Assessment WFL   Bed Mobility   Additional Comments Deferred OOB by RN   Activity Tolerance   Activity Tolerance Treatment limited secondary to medical complications (Comment)   Nurse Made Aware Yes: Bernardo   Assessment   Prognosis Good   Problem List Decreased strength;Decreased endurance; Impaired balance;Decreased mobility; Decreased coordination;Decreased cognition; Impaired judgement;Decreased safety awareness;Decreased skin integrity   Assessment Patient seen for Physical Therapy evaluation  Patient admitted with Septic shock ([facility] [address] )  Comorbidities affecting patient's physical performance include: Downs Syndrome, Seizures, Dysphasia/chronic PEG tube    Personal factors affecting patient at time of initial evaluation include: communication issues, inability to ambulate household distances, inability to navigate level surfaces without external assistance, decreased cognition, positive fall history, decreased initiation and engagement, compliance, inability to perform physical activity, limited insight into impairments, decreased social skills, inability to perform ADLS, inability to perform IADLS  and inability to live alone  Prior to admission, patient was dependent for mobility, requiring assist for ADLS, requiring assist for IADLS, a resident of long term care and having 24 hour care   Please find objective findings from Physical Therapy assessment regarding body systems outlined above with impairments and limitations including weakness, impaired balance, decreased endurance, impaired coordination, gait deviations, decreased activity tolerance, decreased functional mobility tolerance, decreased safety awareness, impaired judgement, fall risk, decreased skin integrity and decreased cognition  The Barthel Index was used as a functional outcome tool presenting with a score of 10 today indicating marked limitations of functional mobility and ADLS  Patient's clinical presentation is currently unstable/unpredictable as seen in patient's presentation of varying levels of cognitive performance, increased fall risk, new onset of impairment of functional mobility, decreased endurance and new onset of weakness  Pt would benefit from continued Physical Therapy treatment to address deficits as defined above and maximize level of functional mobility  As demonstrated by objective findings, the assigned level of complexity for this evaluation is high  Goals   Patient Goals none stated: pt non verbal   STG Expiration Date 10/17/18   Short Term Goal #1 Max A of 2 for transfer to edge of bed; Mod A for bedmobility: rolling r/l   Short Term Goal #2 Max A of 2 for transfers sit <> stand and from bed <> chair   LTG Expiration Date 10/24/18   Long Term Goal #1 Mod A of 2 for transfers bed <> w/c via stand Pivot to allow pt to return to [facility]  Treatment Day 0   Plan   Treatment/Interventions Functional transfer training; Therapeutic exercise; Endurance training;Cognitive reorientation;Patient/family training;Bed mobility;Gait training   PT Frequency (3 - 5 x/wk)   Recommendation   Recommendation (return to [facility]   Additional Comments transfers TBD when appropriate   Barthel Index   Feeding 0   Bathing 0   Grooming Score 0   Dressing Score 0   Bladder Score 0   Bowels Score 5   Toilet Use Score 0   Transfers (Bed/Chair) Score 5   Mobility (Level Surface) Score 0   Stairs Score 0   Barthel Index Score 10   Licensure   [place] License Number  Castro Snyder PT  [id number]   In bed with wrist restraints, bed alarm; RN aware

## 2018-10-10 NOTE — PROGRESS NOTES
Progress Note - Nephrology   Angeles Sheikh 52 y o  male MRN: [id number]  Unit/Bed#: ICU 04 Encounter: [id number]    Assessment:  1  Acute renal failure/acute kidney injury secondary to ATN  2017 his baseline creatinine had been 0 9-1 2   On admission on October 2nd his creatinine was 2 7  Today, October 10th, his creatinine is decreased from 2 9 to 2 4  Was as high as 3 4 on October 7th  The ATN with secondary to septic shock due to Proteus mirabilis pneumonia with bacteremia the patient also has a suprapubic catheter with a polymicrobial urinary tract infection  His kidney function is improving  2  Hypernatremia:  His sodium is 148  I spoke with advanced practitioner from ICU team   Will defer management to ICU team consider increasing free water flushes to 250 cc every 4 hours given his sodium is increased to 148     3  Fluid management:  He does not examine to be fluid overloaded  Plan:  His kidney function is improving  Appreciate critical care input  Subjective:   Patient seen in follow-up for acute renal failure  Patient is currently extubated  Patient is lying flat no acute distress  Getting tube feeds via PEG tube med is getting free water flushes 150 cc every 6 hours  Urine output 1 3 L over the past 24 hours no acute events overnight  Objective:     Vitals: Blood pressure 120/59, pulse 80, temperature (!) 97 2 °F (36 2 °C), temperature source Temporal, resp  rate 21, height 5' 6" (1 676 m), weight 71 8 kg (158 lb 4 6 oz), SpO2 94 %  ,Body mass index is 25 55 kg/m²  Weight (last 2 days)     Date/Time   Weight    10/09/18 0319  71 8 (158 29)    10/08/18 0600  71 (156 53)                Intake/Output Summary (Last 24 hours) at 10/10/18 0705  Last data filed at 10/10/18 0601   Gross per 24 hour   Intake          2085 58 ml   Output             1360 ml   Net           725 58 ml       Urethral Catheter 18 Fr   (Active)       Physical Exam: General: patient is in NAD  Skin:  No new rash  Eyes:  No scleral icterus  Neck:  Supple no adenopathy  Chest:  Coarse breath sounds  CVS:  S1-S2  Abdomen:  Positive bowel sounds  Extremities:  No significant edema  Neuro:  Nonfocal                Prescriptions Prior to Admission   Medication    bisacodyl (DULCOLAX) 10 mg suppository    Docusate Sodium 50 MG/15ML LIQD    famotidine (PEPCID) 40 mg/5 mL suspension    ketoconazole (NIZORAL) 2 % cream    lacosamide (VIMPAT) 50 mg    lactobacillus acidophilus-bulgaricus (FLORANEX) tablet    Melatonin 5 MG TABS    Mouthwashes (BIOTENE DRY MOUTH) LIQD    Nutritional Supplements (NUTREN 1 5 PO)    polyethylene glycol (MIRALAX) 17 g packet    QUEtiapine (SEROquel) 25 mg tablet    QUEtiapine (SEROquel) 50 mg tablet    silver sulfadiazine (SILVADENE,SSD) 1 % cream       Current Facility-Administered Medications   Medication Dose Route Frequency    bisacodyl (DULCOLAX) rectal suppository 10 mg  10 mg Rectal Daily PRN    haloperidol lactate (HALDOL) injection 5 mg  5 mg Intravenous Once    heparin (porcine) subcutaneous injection 5,000 Units  5,000 Units Subcutaneous Q8H [address]    insulin lispro (HumaLOG) 100 units/mL subcutaneous injection 1-5 Units  1-5 Units Subcutaneous Q6H [address]    ipratropium-albuterol (DUO-NEB) 0 5-2 5 mg/3 mL inhalation solution 3 mL  3 mL Nebulization Q6H    lacosamide (VIMPAT) tablet 100 mg  100 mg Per G Tube Q12H [address]    lactobacillus acidophilus-bulgaricus (FLORANEX) packet 1 packet  1 packet Oral BID    melatonin tablet 6 mg  6 mg Oral HS    omeprazole (PRILOSEC) suspension 2 mg/mL  20 mg Oral Daily    polyethylene glycol (MIRALAX) packet 17 g  17 g Per G Tube Daily PRN    QUEtiapine (SEROquel) tablet 25 mg  25 mg Per G Tube QAM    QUEtiapine (SEROquel) tablet 50 mg  50 mg Per G Tube HS        Lab, Imaging and other studies: I have personally reviewed pertinent labs    CBC: Lab Results   Component Value Date    WBC 10 00 10/10/2018    RBC 2 14 (L) 10/10/2018     CMP: Lab Results   Component Value Date     (H) 10/10/2018     (H) 10/10/2018    CO2 31 10/10/2018    BUN 93 (H) 10/10/2018    CREATININE 2 40 (H) 10/10/2018    CALCIUM 7 9 (L) 10/10/2018    AST 16 10/08/2018    ALT 16 10/08/2018    ALKPHOS 121 (H) 10/08/2018    EGFR 29 10/10/2018     Phosphorus:   Lab Results   Component Value Date    PHOS 5 9 (H) 10/10/2018     Magnesium:   Lab Results   Component Value Date    MG 2 0 10/10/2018     Urinalysis: Lab Results   Component Value Date    COLORU Rowan 10/02/2018    CLARITYU Cloudy 10/02/2018    SPECGRAV 1 010 10/02/2018    PHUR >=9 0 10/02/2018    LEUKOCYTESUR Large (A) 10/02/2018    NITRITE Negative 10/02/2018    PROTEINUA 100 (2+) (A) 10/02/2018    GLUCOSEU Negative 10/02/2018    KETONESU Negative 10/02/2018    BILIRUBINUR Interference- unable to analyze (A) 10/02/2018    BLOODU Large (A) 10/02/2018     BMP: Lab Results   Component Value Date    CO2 31 10/10/2018    BUN 93 (H) 10/10/2018    CREATININE 2 40 (H) 10/10/2018    CALCIUM 7 9 (L) 10/10/2018

## 2018-10-10 NOTE — OCCUPATIONAL THERAPY NOTE
OT EVALUATION     10/10/18 4880   Note Type   Note type Eval only   Restrictions/Precautions   Other Precautions Cognitive; Bed Alarm; Fall Risk;O2;Restraints   Pain Assessment   Pain Assessment FLACC   Pain Rating: FLACC (Rest) - Face 0   Pain Rating: FLACC (Rest) - Legs 1   Pain Rating: FLACC (Rest) - Activity 1   Pain Rating: FLACC (Rest) - Cry 0   Pain Rating: FLACC (Rest) - Consolability 0   Score: FLACC (Rest) 2   Home Living   Type of Home ([address] )   Home Equipment [facility]   Additional Comments per transfer form pt needs assist with transferring and toileting, dependent for mobility and feeding  Patient has a G-tube   Prior Function   Level of [place] Needs assistance with IADLs; Needs assistance with ADLs and functional mobility   Lives With Facility staff   Receives Help From Personal care attendant   ADL Assistance Needs assistance   IADLs Needs assistance   ADL   Eating Assistance 1  Total Assistance   Grooming Assistance 1  Total Assistance   UB Bathing Assistance 1  Total Assistance   LB Bathing Assistance 1  Total Assistance   [address] 1  Total Assistance   LB Dressing Assistance 1  Total Assistance   Toileting Assistance  1  Total Assistance   Bed Mobility   Rolling R 2  Maximal assistance   Rolling L 2  Maximal assistance   Transfers   Sit to Stand Unable to assess  (per ICU nurse in bed only )   Activity Tolerance   Activity Tolerance Treatment limited secondary to medical complications (Comment); Treatment limited secondary to agitation   RUE Assessment   RUE Assessment (PROM WFL)   LUE Assessment   LUE Assessment (PROM WFL)   Cognition   Overall Cognitive Status Impaired   Attention CHRISTI   Following Commands Unable to follow one step commands   Assessment   Limitation Decreased ADL status; Decreased UE ROM; Decreased UE strength;Decreased Safe judgement during ADL;Decreased cognition;Decreased endurance;Decreased self-care trans;Decreased high-level ADLs  (decreased balance )   Prognosis Good   Assessment Patient evaluated by Occupational Therapy  Patient admitted with Septic shock ([facility] [address] )  The patients occupational profile, medical and therapy history includes a extensive additional review of physical, cognitive, or psychosocial history related to current functional performance  Comorbidities affecting functional mobility and ADLS include: Downs syndrome,  DVT, seizures and dysphagia  Prior to admission, patient was dependent for mobility, requiring assist for ADLS and requiring assist for IADLS  The evaluation identifies the following performance deficits: weakness, decreased ROM, impaired balance, decreased endurance, increased fall risk, new onset of impairment of functional mobility, decreased ADLS, decreased IADLS, decreased activity tolerance, decreased safety awareness, impaired judgement, decreased cognition and decreased strength, that result in activity limitations and/or participation restrictions  This evaluation requires clinical decision making of high complexity, because the patient presents with comorbidites that affect occupational performance and required significant modification of tasks or assistance with consideration of multiple treatment options  The Barthel Index was used as a functional outcome tool presenting with a score of 0, indicating marked limitations of functional mobility and ADLS  Patient will benefit from a trial of skilled Occupational Therapy services to address above deficits and facilitate a safe return to prior level of function  Goals   Patient Goals unable to state due to cognitive impairment   STG Time Frame (1-7 days)   Short Term Goal  Patient will increase bed mobility to mod assist of 2 in preparation for ADLS and transfers;   Patient will tolerate 10 minutes of UE ROM/strengthening to increase general activity tolerance and performance in ADLS/IADLS; Patient will improve functional activity tolerance to 10 minutes of sustained functional tasks to increase participation in basic self-care and decrease assistance level;  Patient will increase dynamic sitting balance to poor+ to improve the ability to sit at edge of bed or on a chair for ADLS;  Patient will increase dynamic standing balance to poor+ to improve postural stability and decrease fall risk during standing ADLS and transfers  LTG Time Frame (8-14 days)   Long Term Goal Patient will increase bed mobility to mod assist in preparation for ADLS and transfers; Patient will tolerate 20 minutes of UE ROM/strengthening to increase general activity tolerance and performance in ADLS/IADLS; Patient will improve functional activity tolerance to 20 minutes of sustained functional tasks to increase participation in basic self-care and decrease assistance level;  Patient will increase dynamic sitting balance to fair- to improve the ability to sit at edge of bed or on a chair for ADLS;  Patient will increase dynamic standing balance to poor+ to improve postural stability and decrease fall risk during standing ADLS and transfers  Functional Transfer Goals   Pt Will Perform All Functional Transfers (STG max assist of 2 LTG mod assist of 2)   ADL Goals   Pt Will Perform Grooming (STG max assist LTG mod assist )   Pt Will Perform Toileting (STG max assist LTG mod assist )   Plan   Treatment Interventions ADL retraining;Functional transfer training;UE strengthening/ROM; Endurance training;Patient/family training;Equipment evaluation/education; Activityengagement; Energy conservation;Cognitive reorientation   Goal Expiration Date 10/24/18   Treatment Day 0   OT Frequency 2-3x/wk   Recommendation   OT Discharge Recommendation (return to [facility])   Barthel Index   Feeding 0   Bathing 0   Grooming Score 0   Dressing Score 0   Bladder Score 0   Bowels Score 0   Toilet Use Score 0   Transfers (Bed/Chair) Score 0   Mobility (Level Surface) Score 0   Stairs Score 0   Barthel Index Score [address] License Number  Ellie Severino Maico 85 OTR/L [id number]

## 2018-10-10 NOTE — CASE MANAGEMENT
Continued Stay Review  Date: 10/10/18  Vital Signs: /64   Pulse 66   Temp 98 4 °F (36 9 °C) (Oral)   Resp 12   Ht 5' 6" (1 676 m)   Wt 71 8 kg (158 lb 4 6 oz)   SpO2 95%   BMI 25 55 kg/m²     Medications:   Scheduled Meds:   Current Facility-Administered Medications:  bisacodyl 10 mg Rectal Daily PRN Ruthann Bashir MD   haloperidol lactate 5 mg Intravenous Once REMEDIOS Andres   heparin (porcine) 5,000 Units Subcutaneous Q8H Eugenie Galindo MD   insulin lispro 1-5 Units Subcutaneous Q6H [facility] REMEDIOS Jaimes   ipratropium-albuterol 3 mL Nebulization Q6H Memory Canavan, MD   lacosamide 100 mg Per G Tube Q12H Eugenie Galindo MD   lactobacillus acidophilus-bulgaricus 1 packet Oral BID Ruthann Bashir MD   melatonin 6 mg Oral HS REMEDIOS Andres   omeprazole (PRILOSEC) suspension 2 mg/mL 20 mg Oral Daily Ruthann Bashir MD   polyethylene glycol 17 g Per G Tube Daily PRN Ruthann Bashir MD   QUEtiapine 25 mg Per Tala AppletonMD jose rafael   QUEtiapine 50 mg Per G Tube HS Ruthann Bashir MD     Continuous Infusions:    PRN Meds:   bisacodyl    polyethylene glycol    Abnormal Labs/Diagnostic Results:   BANDS 24   BUN 93 CR 2 40 CA 7 9 GFR 29 HGB 7 3 PLT 75 PHOS 5 9   CXR=Bilateral pneumonia  Right pleural effusion  Age/Sex: 52 y o  male     Assessment/Plan:   patient is is awake, nonverbal, appears to be in NAD   RRR  Lungs are CTABL but there appears to be upper airway secretions  Abd is soft, nontender to palpation, G-tube and suprapubic catheter are in place  Trace edema in b/l UE  No edema in b/l LE      Patient extubated yesterday and currently on 5 L/min NC w/ SpO2 in the mid 90s  Continue chest PT  Will ask RT to suction orally to assist with oral secretion clearance  Clinically patient seems more volume overloaded    Will give trial of low dose IV Lasix to see if that helps decrease O2 requirements but will continue to give FWF via G-tube in attempt to prevent worsening of hypernatremia      Creatinine continues to downtrend and UOP remains adequate    Thrombocytopenia and anemia are stable      Patient has completed course of abx for UTI, suspected PNA, and bacteremia    Discharge Plan: TBD

## 2018-10-11 ENCOUNTER — APPOINTMENT (INPATIENT)
Dept: RADIOLOGY | Facility: HOSPITAL | Age: 53
DRG: 871 | End: 2018-10-11
Payer: MEDICARE

## 2018-10-11 LAB
ANION GAP SERPL CALCULATED.3IONS-SCNC: 7 MMOL/L (ref 4–13)
ANISOCYTOSIS BLD QL SMEAR: PRESENT
BASOPHILS # BLD MANUAL: 0.17 THOUSAND/UL (ref 0–0.1)
BASOPHILS NFR MAR MANUAL: 2 % (ref 0–1)
BUN SERPL-MCNC: 84 MG/DL (ref 5–25)
CALCIUM SERPL-MCNC: 8 MG/DL (ref 8.3–10.1)
CHLORIDE SERPL-SCNC: 113 MMOL/L (ref 100–108)
CO2 SERPL-SCNC: 28 MMOL/L (ref 21–32)
CREAT SERPL-MCNC: 1.99 MG/DL (ref 0.6–1.3)
EOSINOPHIL # BLD MANUAL: 0.42 THOUSAND/UL (ref 0–0.4)
EOSINOPHIL NFR BLD MANUAL: 5 % (ref 0–6)
ERYTHROCYTE [DISTWIDTH] IN BLOOD BY AUTOMATED COUNT: 18 % (ref 11.6–15.1)
GFR SERPL CREATININE-BSD FRML MDRD: 37 ML/MIN/1.73SQ M
GLUCOSE SERPL-MCNC: 104 MG/DL (ref 65–140)
GLUCOSE SERPL-MCNC: 136 MG/DL (ref 65–140)
HCT VFR BLD AUTO: 24.4 % (ref 36.5–49.3)
HGB BLD-MCNC: 7.7 G/DL (ref 12–17)
LYMPHOCYTES # BLD AUTO: 1.42 THOUSAND/UL (ref 0.6–4.47)
LYMPHOCYTES # BLD AUTO: 17 % (ref 14–44)
MAGNESIUM SERPL-MCNC: 1.9 MG/DL (ref 1.6–2.6)
MCH RBC QN AUTO: 33.9 PG (ref 26.8–34.3)
MCHC RBC AUTO-ENTMCNC: 31.6 G/DL (ref 31.4–37.4)
MCV RBC AUTO: 108 FL (ref 82–98)
METAMYELOCYTES NFR BLD MANUAL: 2 % (ref 0–1)
MONOCYTES # BLD AUTO: 0.83 THOUSAND/UL (ref 0–1.22)
MONOCYTES NFR BLD: 10 % (ref 4–12)
MYELOCYTES NFR BLD MANUAL: 1 % (ref 0–1)
NEUTROPHILS # BLD MANUAL: 5.25 THOUSAND/UL (ref 1.85–7.62)
NEUTS BAND NFR BLD MANUAL: 1 % (ref 0–8)
NEUTS SEG NFR BLD AUTO: 62 % (ref 43–75)
NRBC BLD AUTO-RTO: 0 /100 WBCS
PHOSPHATE SERPL-MCNC: 5.2 MG/DL (ref 2.7–4.5)
PLATELET # BLD AUTO: 90 THOUSANDS/UL (ref 149–390)
PLATELET BLD QL SMEAR: ABNORMAL
PMV BLD AUTO: 12.3 FL (ref 8.9–12.7)
POLYCHROMASIA BLD QL SMEAR: PRESENT
POTASSIUM SERPL-SCNC: 3.9 MMOL/L (ref 3.5–5.3)
RBC # BLD AUTO: 2.27 MILLION/UL (ref 3.88–5.62)
RBC MORPH BLD: PRESENT
SODIUM SERPL-SCNC: 148 MMOL/L (ref 136–145)
TARGETS BLD QL SMEAR: PRESENT
TOTAL CELLS COUNTED SPEC: 100
WBC # BLD AUTO: 8.33 THOUSAND/UL (ref 4.31–10.16)

## 2018-10-11 PROCEDURE — 94760 N-INVAS EAR/PLS OXIMETRY 1: CPT

## 2018-10-11 PROCEDURE — 85027 COMPLETE CBC AUTOMATED: CPT | Performed by: NURSE PRACTITIONER

## 2018-10-11 PROCEDURE — 82948 REAGENT STRIP/BLOOD GLUCOSE: CPT

## 2018-10-11 PROCEDURE — 94640 AIRWAY INHALATION TREATMENT: CPT

## 2018-10-11 PROCEDURE — 71045 X-RAY EXAM CHEST 1 VIEW: CPT

## 2018-10-11 PROCEDURE — 99232 SBSQ HOSP IP/OBS MODERATE 35: CPT | Performed by: INTERNAL MEDICINE

## 2018-10-11 PROCEDURE — 99233 SBSQ HOSP IP/OBS HIGH 50: CPT | Performed by: ANESTHESIOLOGY

## 2018-10-11 PROCEDURE — 84100 ASSAY OF PHOSPHORUS: CPT | Performed by: NURSE PRACTITIONER

## 2018-10-11 PROCEDURE — 80048 BASIC METABOLIC PNL TOTAL CA: CPT | Performed by: NURSE PRACTITIONER

## 2018-10-11 PROCEDURE — 83735 ASSAY OF MAGNESIUM: CPT | Performed by: NURSE PRACTITIONER

## 2018-10-11 PROCEDURE — 94669 MECHANICAL CHEST WALL OSCILL: CPT

## 2018-10-11 PROCEDURE — 85007 BL SMEAR W/DIFF WBC COUNT: CPT | Performed by: NURSE PRACTITIONER

## 2018-10-11 RX ORDER — POTASSIUM CHLORIDE 20MEQ/15ML
20 LIQUID (ML) ORAL ONCE
Status: COMPLETED | OUTPATIENT
Start: 2018-10-11 | End: 2018-10-11

## 2018-10-11 RX ORDER — MAGNESIUM SULFATE HEPTAHYDRATE 40 MG/ML
2 INJECTION, SOLUTION INTRAVENOUS ONCE
Status: COMPLETED | OUTPATIENT
Start: 2018-10-11 | End: 2018-10-11

## 2018-10-11 RX ORDER — HALOPERIDOL 5 MG/ML
5 INJECTION INTRAMUSCULAR ONCE
Status: COMPLETED | OUTPATIENT
Start: 2018-10-11 | End: 2018-10-11

## 2018-10-11 RX ORDER — QUETIAPINE FUMARATE 25 MG/1
50 TABLET, FILM COATED ORAL
Status: DISCONTINUED | OUTPATIENT
Start: 2018-10-11 | End: 2018-10-12 | Stop reason: HOSPADM

## 2018-10-11 RX ADMIN — HEPARIN SODIUM 5000 UNITS: 5000 INJECTION, SOLUTION INTRAVENOUS; SUBCUTANEOUS at 05:23

## 2018-10-11 RX ADMIN — LACTOBACILLUS ACIDOPHILUS / LACTOBACILLUS BULGARICUS 1 PACKET: 100 MILLION CFU STRENGTH GRANULES at 17:37

## 2018-10-11 RX ADMIN — QUETIAPINE 50 MG: 25 TABLET ORAL at 21:38

## 2018-10-11 RX ADMIN — POTASSIUM CHLORIDE 20 MEQ: 20 SOLUTION ORAL at 08:16

## 2018-10-11 RX ADMIN — HEPARIN SODIUM 5000 UNITS: 5000 INJECTION, SOLUTION INTRAVENOUS; SUBCUTANEOUS at 21:39

## 2018-10-11 RX ADMIN — LACTOBACILLUS ACIDOPHILUS / LACTOBACILLUS BULGARICUS 1 PACKET: 100 MILLION CFU STRENGTH GRANULES at 08:16

## 2018-10-11 RX ADMIN — IPRATROPIUM BROMIDE AND ALBUTEROL SULFATE 3 ML: .5; 3 SOLUTION RESPIRATORY (INHALATION) at 20:53

## 2018-10-11 RX ADMIN — HALOPERIDOL LACTATE 5 MG: 5 INJECTION, SOLUTION INTRAMUSCULAR at 13:19

## 2018-10-11 RX ADMIN — IPRATROPIUM BROMIDE AND ALBUTEROL SULFATE 3 ML: .5; 3 SOLUTION RESPIRATORY (INHALATION) at 07:49

## 2018-10-11 RX ADMIN — MELATONIN TAB 3 MG 6 MG: 3 TAB at 21:38

## 2018-10-11 RX ADMIN — LACOSAMIDE 100 MG: 50 TABLET, FILM COATED ORAL at 21:38

## 2018-10-11 RX ADMIN — HEPARIN SODIUM 5000 UNITS: 5000 INJECTION, SOLUTION INTRAVENOUS; SUBCUTANEOUS at 13:55

## 2018-10-11 RX ADMIN — IPRATROPIUM BROMIDE AND ALBUTEROL SULFATE 3 ML: .5; 3 SOLUTION RESPIRATORY (INHALATION) at 13:59

## 2018-10-11 RX ADMIN — LACOSAMIDE 100 MG: 50 TABLET, FILM COATED ORAL at 08:17

## 2018-10-11 RX ADMIN — IPRATROPIUM BROMIDE AND ALBUTEROL SULFATE 3 ML: .5; 3 SOLUTION RESPIRATORY (INHALATION) at 01:49

## 2018-10-11 RX ADMIN — Medication 20 MG: at 08:46

## 2018-10-11 RX ADMIN — MAGNESIUM SULFATE HEPTAHYDRATE 2 G: 40 INJECTION, SOLUTION INTRAVENOUS at 07:58

## 2018-10-11 NOTE — SOCIAL WORK
SW following to monitor needs and assist with planning  Case discussed with ICU physician  Pt is a resident of [place]  Extubated just recently  Still on oxygen, somewhat lethargic  Plan is for pt to return to [place] when stable  SW will continue to follow to monitor progress and assist with pt's transfer back to center when ready

## 2018-10-11 NOTE — PROGRESS NOTES
Daily Progress Note - Critical Care/ Stepdown   Angelo Miners 52 y o  male MRN: [id number]  Unit/Bed#: ICU 04 Encounter: [id number]    ______________________________________________________________________  Assessment:   Principal Problem (Resolved):    Septic shock ([facility] [address] )  Active Problems:    Pneumonia of right upper lobe due to infectious organism [facility])    Acute respiratory failure with hypoxia ([facility] [address] )    Acute kidney injury ([facility] [address] )    Urinary tract infection associated with catheterization of urinary tract (HCC)    ATN (acute tubular necrosis) ([facility])    Gram-negative bacteremia    Toxic metabolic encephalopathy    Anemia    Hypernatremia    Thrombocytopenia (HCC)    Pressure injury of right buttock, unstageable (HCC)    Seizure disorder ([facility])    Impulse control disorder    Down syndrome  Resolved Problems:    Leukocytosis      * Septic shock (HCC)resolved as of 10/10/2018   Assessment & Plan    · Admitted 10/2 with sepsis secondary to UTI and Pneumonia  · Improved and transferred to the floor on 10/4 , and return early morning 10/5 with hypoxia and hypotension  · White count continues to improve  Procalcitonin improving  · Became hypotensive  after intubation  Required high dose pressors and steroids were added  Started on high-dose vitamin C and thiamine  · Echo on 10/5/18: EF: 60%, no significant valvular dysfunction, no findings that would suggest moderate or severe pulmonary hypertension    · Procalcitonin on admission:  · Current Procalcitonin: 1 09  · BCX x2: Proteus  · Sputum cx unimpressive  · Influenza A/B: Negative  · UCx:  E coli, Morganella morganii, beta-hemolytic Streptococcus group G  · Repeat CXR on 10/8/18:  · Worsening bilateral infiltrates, left side greater than right, and moderate-sized right pleural effusion  · Pressors have been weaned off  · Completed 7 day course of cefepime  · Patient was extubated yesterday     Acute respiratory failure with hypoxia [facility])   Assessment & Plan    Likely secondary to healthcare associated pneumonia  as patient is from [facility]  Pt was intubated 10/5 for increase WOB and hypoxia  Extubated 10/9 to nasal canula  · Sputum culture: Mixed Respiratory Luann  · Repeat CXR on 10/8/18:  · Worsening bilateral infiltrates, left side greater than right, and moderate-sized right pleural effusion  · Repeat xray demonstrates slight improvement  · Weaning supplemental oxygen for O2 saturation greater than 92%       Pneumonia of right upper lobe due to infectious organism [facility])   Assessment & Plan    · Repeat CXR on 10/8/18:  · Worsening bilateral infiltrates, left side greater than right, and moderate-sized right pleural effusion  · Procalcitonin: down trended 1 09  · Influenza A/B: Negative  · BCx x2: Proteus  · Sputum CX/GS positive for respiratory luann  · Pt completed 7 day course of cefepime  · Continue aggressive pulmonary toilet  · NT suctioning as needed  · Continue chest PT  · Wean supplemental oxygen for O2 saturation greater than 92%           Acute kidney injury ([address] )   Assessment & Plan    · Likely secondary to acute illness/septic shock  · Baseline Cr: 0 9-1  · Cr on admission: 2 79 peaked 3 4  · Current Cr: 1 99 today  · Will monitor with daily CMP  · Diuresed with Lasix on 10/5 and 10/6 due to concern for volume overload  · May benefit form additional diuresis  Responded well to 20 mg IV lasix yesterday  · Nephrology Recommendations Appreciated     Urinary tract infection associated with catheterization of urinary tract [facility])   Assessment & Plan    · Pt with chronic suprapubic catheter  · Catheter exchanged on 10/8  · UA on admission:   · + Leukocytes, Large Blood, Innumerable WBC/Bacteria  · UCx with E coli     · Pt completed 7 day course of cefepime     Gram-negative bacteremia   Assessment & Plan    · Patient's blood cultures positive x2 for Proteus mirabilis and Morganella morganii  · Patient completed 7 day course of cefepime     ATN (acute tubular necrosis) [facility])   Assessment & Plan    · Improving  · Nephrology Following     Leukocytosisresolved as of 10/6/2018   Assessment & Plan    · Resolved  · Continues with bandemia   · WBC on admission: 20 74  · Current WBC: 9 9  · Will monitor with daily CBC     Toxic metabolic encephalopathy   Assessment & Plan    · Unsure of pt baseline status  Discussion with [facility] staff via telephone conversation: Pt is interactive, but mostly nonverbal  Will yell out things like, "shut up" and "ok", but does not converse  Pt can be combative at times  · Pt is more awake and interactive today  Will continue to hold Seroquel and add back     Hypernatremia   Assessment & Plan    · Patient's sodium level increased to 148 today  · Will increase free water flushes to 300 mL q 4 hours  · Nephrology recommendations appreciated     Anemia   Assessment & Plan    · Unclear whether this is acute versus chronic issue  · Patient's hemoglobin has been stable in the 7s  · Will continue monitor and trend  · Consider additional workup once acute illness resolved        Thrombocytopenia ([facility] [address] )   Assessment & Plan    · Platelets 243 on admission  · Improved to 90 today  · Famotidine discontinued  · Will continue to monitor and trend     Pressure injury of right buttock, unstageable (HCC)   Assessment & Plan    · POA  · Continue Daily Wound Care  · Frequent Turning and Offloading     Seizure disorder ([facility] [address] )   Assessment & Plan    · Continue home medications:   · Vimpat  · Lacosamide level 16 9     Down syndrome   Assessment & Plan    · At Baseline     Impulse control disorder   Assessment & Plan    · At Baseline  · Continue at home medication: Seroquel  · Agitation Control prn         Plan:    Neuro:   · Delirium: CAM ICU positive no   · Pain controlled with: NA   · Pain score: none  · Regulate sleep/wake cycle    CV:   · Rhythm: NSR  · Follow rhythm on telemetry    Pulm:   · Continue aggressive pulmonary toilet  · Titrate supplemental oxygen for O2 saturation greater than 92%    GI:   · Nutrition/diet plan:  Continue current tube feeds  · Stress ulcer prophylaxis: No prophylaxis needed  · Bowel regimen: None currently  · Last BM: 10/11 small brown loose    FEN:   · Fluid/Diuretic plan: No intervention  · Goal 24 hour fluid balance:  Euvolemic  · Electrolytes repleted: yes  · Goal: K >4 0, Mag >2 0, and Phos >3 0    :   · Indwelling Godoy present:  Chronic suprapubic catheter     ID:   · Abx ordered:  Patient completed 7 day course of cefepime  · Trend temps and WBC count    Heme:   · Hemoglobin stable at 7 7  · Trend hgb and plts    Endo:   · Glycemic control plan: Blood glucose controlled on current regimen    Msk/Skin:  · Mobility goal:  Maintain skin integrity  · PT consult: no  · OT consult: no  · Frequent turning and off-loading    Family:  · Family updated within 24 hours: yes   · Family meeting planned today: no     VTE Prophylaxis:  · Pharmacologic Prophylaxis: Heparin  · Mechanical Prophylaxis: sequential compression device    Disposition: Continue Stepdown    Code Status: Level 1 - Full Code    Counseling / Coordination of Care  Total time spent today 35 minutes  Greater than 50% of total time was spent with the patient and / or family counseling and / or coordination of care  A description of the counseling / coordination of care: as discussed with patient and care team    ______________________________________________________________________    HPI/24hr events:   No acute events overnight  Pt appears more awake and interactive today      ______________________________________________________________________    Physical Exam:   Physical Exam   Constitutional: He appears well-developed and well-nourished  He appears lethargic  He is easily aroused  Nasal cannula in place  HENT:   Head: Normocephalic and atraumatic     Mouth/Throat: Oropharynx is clear and moist and mucous membranes are normal    Eyes: Pupils are equal, round, and reactive to light  Conjunctivae are normal    Neck: Normal range of motion  Neck supple  Cardiovascular: Normal rate, normal heart sounds and intact distal pulses  Pulmonary/Chest: Effort normal  He has rhonchi  Abdominal: Soft  Bowel sounds are normal    G tube intact  Excoriation around site  Genitourinary:   Genitourinary Comments: Suprapubic catheter intact   Musculoskeletal: Normal range of motion  Neurological: He is easily aroused  He appears lethargic  Patient moves all extremities equally bilaterally  He does not follow commands, he does not converse however will answer Jamie Jhaveri occasionally  Patient more alert and interactive  Skin: Skin is warm, dry and intact  Capillary refill takes less than 2 seconds  ______________________________________________________________________  Vitals:    10/11/18 0200 10/11/18 0400 10/11/18 0600 10/11/18 0709   BP: 137/62 105/50 108/61    Pulse: 77 79 76    Resp: 16 15 15    Temp:  97 9 °F (36 6 °C)  (!) 97 4 °F (36 3 °C)   TempSrc:  Temporal  Temporal   SpO2: 98% 93% 97%    Weight:       Height:         Arterial Line BP: 114/56  Arterial Line MAP (mmHg): 76 mmHg     Temperature:   Temp (24hrs), Av 8 °F (36 6 °C), Min:97 3 °F (36 3 °C), Max:98 4 °F (36 9 °C)    Current Temperature: (!) 97 4 °F (36 3 °C)    Weights:   IBW: 63 8 kg    Body mass index is 25 55 kg/m²    Weight (last 2 days)     Date/Time   Weight    10/09/18 0319  71 8 (158 29)              Hemodynamic Monitoring:  N/A       Non-Invasive/Invasive Ventilation Settings:  Respiratory    Lab Data (Last 4 hours)    None         O2/Vent Data (Last 4 hours)    None              No results found for: PHART, JYD3MHJ, PO2ART, XSI5IMD, M2QGCLEH, BEART, SOURCE  SpO2: SpO2: 96 %    Intake and Outputs:  I/O       10/09 0701 - 10/10 0700 10/10 0701 - 10/11 0700 10/11 0701 - 10/12 07    I V  (mL/kg) 135 6 (1 9)      NG/GT 1000 1150     Feedings 950 1178     Total Intake(mL/kg) 2085 6 (29) 2328 (32 4)     Urine (mL/kg/hr) 1360 (0 8) 2660 (1 5)     Total Output 1360 2660      Net +725 6 -332             Unmeasured Stool Occurrence  4 x           Nutrition:        Diet Orders            Start     Ordered    10/10/18 0933  Diet Enteral/Parenteral; Tube Feeding No Oral Diet; Jevity 1 5; Continuous; 50; 250; Water; Every 4 hours  Diet effective now     Question Answer Comment   Diet Type Enteral/Parenteral    Enteral/Parenteral Tube Feeding No Oral Diet    Tube Feeding Formula: Jevity 1 5    Bolus/Cyclic/Continuous Continuous    Tube Feeding Goal Rate (mL/hr): 50    Tube Feeding water flush (mL): 250    Water Flush type: Water    Water flush frequency: Every 4 hours    RD to adjust diet per protocol? Yes        10/10/18 0933    10/02/18 1926  Room Service  Once     Question:  Type of Service  Answer:  Room Service- Not Appropriate    10/02/18 1925          Labs:     Results from last 7 days  Lab Units 10/11/18  0530 10/10/18  0436 10/09/18  0453 10/08/18  0520   WBC Thousand/uL 8 33 10 00 9 90 17 14*   HEMOGLOBIN g/dL 7 7* 7 3* 7 1* 7 7*   HEMATOCRIT % 24 4* 22 8* 22 0* 24 1*   PLATELETS Thousands/uL 90* 75* 62* 88*   MONO PCT MAN %  --  5 5 8       Results from last 7 days  Lab Units 10/11/18  0530 10/10/18  0436 10/09/18  0452 10/08/18  0520 10/07/18  0545  10/05/18  1836   SODIUM mmol/L 148* 148* 144 141 141  < > 141   POTASSIUM mmol/L 3 9 3 9 4 2 4 6 4 0  < > 4 2   CHLORIDE mmol/L 113* 111* 108 106 105  < > 106   CO2 mmol/L 28 31 28 25 25  < > 26   BUN mg/dL 84* 93* 92* 85* 73*  < > 61*   CREATININE mg/dL 1 99* 2 40* 2 97* 3 35* 3 40*  < > 2 99*   CALCIUM mg/dL 8 0* 7 9* 8 0* 8 0* 8 2*  < > 8 2*   ALK PHOS U/L  --   --   --  121* 131*  --  142*   ALT U/L  --   --   --  16 18  --  21   AST U/L  --   --   --  16 20  --  25   < > = values in this interval not displayed      Results from last 7 days  Lab Units 10/11/18  0530 10/10/18  0436 10/09/18  0452   MAGNESIUM mg/dL 1 9 2 0 2 1     Lab Results   Component Value Date PHOS 5 2 (H) 10/11/2018    PHOS 5 9 (H) 10/10/2018    PHOS 6 5 (H) 10/09/2018          No results found for: Gerardicynthia Cisneros    Results from last 7 days  Lab Units 10/05/18  0350   LACTIC ACID mmol/L 1 0     ABG:  Lab Results   Component Value Date    PHART 7 371 10/06/2018    XUR9RLJ 45 0 (H) 10/06/2018    PO2ART 77 9 10/06/2018    TFS6UNC 25 5 10/06/2018    BEART 0 1 10/06/2018    SOURCE Line, Arterial 10/05/2018       Imaging:   XR chest portable   Final Result      Continued right-sided pleural effusion and bilateral pulmonary airspace disease without significant change            Workstation performed: [id number]         XR chest portable   Final Result      Bilateral pneumonia  Right pleural effusion  Workstation performed: [id number]         XR chest portable   Final Result   Worsening bilateral infiltrates, left side greater than right, and moderate-sized right pleural effusion  Workstation performed: [id number]         XR chest portable   Final Result   1  Slight progression of bilateral infiltrates  2   Slight improvement in right pleural effusion  Workstation performed: [id number]         XR chest portable ICU   Final Result   1  Left internal jugular central venous catheter is to the left of the spine  Please see follow-up chest radiograph report  2   Bilateral lung opacities as above  Workstation performed: [id number]         XR chest portable   Final Result   1  Left internal jugular central venous catheter is either within a duplicated SVC or within the arterial system  I discussed this with Litzy Cabello 10/5/2018 at approximately 8:40 PM    2   Large right pleural effusion is unchanged  Left lung opacity is unchanged  Workstation performed: [id number]         XR chest portable   Final Result   Endotracheal tube tip projects over the trachea just above the wilda     Increasing right-sided pleural effusion with persistent left-sided airspace opacities noted  Better upright repeat film suggested  Workstation performed: [id number]         US kidney and bladder   Final Result      1   Echogenic kidneys consistent with chronic medical renal disease  2   Bilateral nonobstructing renal calculi  3   Small right renal cyst       4   Diffusely thick walled bladder though completely collapsed  Correlation for cystitis recommended  5   Mild free fluid  Workstation performed: [id number]         XR chest portable   Final Result      Prominence of the cardiac silhouette with increasing perihilar airspace opacities and bilateral pleural effusions/atelectasis, suspect worsening fluid overload/CHF  Superimposed infection could be considered in the appropriate clinical setting  No other significant interval change  Workstation performed: [id number]         XR chest portable   Final Result      Stable right-sided pneumonia with parapneumonic effusion  New left-sided opacity which may also be infectious  The study was marked in [facility] for immediate notification  Workstation performed: [id number]         XR chest portable - 1 view   Final Result      Diffuse pneumonia in the right lung  Suspected right-sided pleural effusion  Workstation performed: [id number]            I have personally reviewed pertinent reports        EKG: NSR on monitor    Micro:  Lab Results   Component Value Date    BLOODCX Proteus mirabilis (A) 10/02/2018    BLOODCX Morganella morganii (A) 10/02/2018    BLOODCX Proteus mirabilis (A) 10/02/2018    BLOODCX Morganella morganii (A) 10/02/2018    URINECX >100,000 cfu/ml Escherichia coli (A) 10/02/2018    URINECX >100,000 cfu/ml Morganella morganii (A) 10/02/2018    URINECX (A) 10/02/2018     >100,000 cfu/ml Beta Hemolytic Streptococcus Group G    SPUTUMCULTUR 1+ Growth of  10/05/2018       Allergies: No Known Allergies  Medications:   Scheduled Meds:  Current Facility-Administered Medications:  bisacodyl 10 mg Rectal Daily PRN Terrence Payne MD   haloperidol lactate 5 mg Intravenous Once REMEDIOS Andres   heparin (porcine) 5,000 Units Subcutaneous Q8H Blas Frost MD   insulin lispro 1-5 Units Subcutaneous Q6H [facility] REMEDIOS Jaimes   ipratropium-albuterol 3 mL Nebulization Q6H Silver Montero MD   lacosamide 100 mg Per G Tube Q12H Blas Frost MD   lactobacillus acidophilus-bulgaricus 1 packet Oral BID Terrence Payne MD   magnesium sulfate 2 g Intravenous Once REMEDIOS Jaimes   melatonin 6 mg Oral HS REMEDIOS Andres   omeprazole (PRILOSEC) suspension 2 mg/mL 20 mg Oral Daily Terrence Payne MD   polyethylene glycol 17 g Per G Tube Daily PRN Terrence Payne MD   potassium chloride 20 mEq Oral Once REMEDIOS Jaimes     Continuous Infusions:   PRN Meds:    bisacodyl 10 mg Daily PRN   polyethylene glycol 17 g Daily PRN       Invasive lines and devices: Invasive Devices     Peripheral Intravenous Line            Long-Dwell Peripheral IV (Midline) 35/54/09 Right Basilic 7 days          Drain            Gastrostomy/Enterostomy Percutaneous endoscopic gastrostomy (PEG) LLQ -- days    Urethral Catheter 18 Fr  -- days    Suprapubic Catheter Latex; Double-lumen 16 Fr  2 days                   SIGNATURE: REMEDIOS Howe  DATE: October 11, 2018

## 2018-10-11 NOTE — PLAN OF CARE
Problem: DISCHARGE PLANNING - CARE MANAGEMENT  Goal: Discharge to post-acute care or home with appropriate resources  INTERVENTIONS:  - Conduct assessment to determine patient/family and health care team treatment goals, and need for post-acute services based on payer coverage, community resources, and patient preferences, and barriers to discharge  - Address psychosocial, clinical, and financial barriers to discharge as identified in assessment in conjunction with the patient/family and health care team  - Arrange appropriate level of post-acute services according to patient's   needs and preference and payer coverage in collaboration with the physician and health care team  - Communicate with and update the patient/family, physician, and health care team regarding progress on the discharge plan  - Arrange appropriate transportation to post-acute venues  Return to [facility] when medically stable  Outcome: Progressing   Plan is for pt to return to [place] when stable  SW will continue to follow to monitor progress and assist with pt's transfer back to [place] when ready

## 2018-10-11 NOTE — PLAN OF CARE
Problem: Potential for Falls  Goal: Patient will remain free of falls  INTERVENTIONS:  - Assess patient frequently for physical needs  -  Identify cognitive and physical deficits and behaviors that affect risk of falls    -  [place] fall precautions as indicated by assessment   - Educate patient/family on patient safety including physical limitations  - Instruct patient to call for assistance with activity based on assessment  - Modify environment to reduce risk of injury  - Consider OT/PT consult to assist with strengthening/mobility   Outcome: Progressing      Problem: PAIN - ADULT  Goal: Verbalizes/displays adequate comfort level or baseline comfort level  Interventions:  - Encourage patient to monitor pain and request assistance  - Assess pain using appropriate pain scale  - Administer analgesics based on type and severity of pain and evaluate response  - Implement non-pharmacological measures as appropriate and evaluate response  - Consider cultural and social influences on pain and pain management  - Notify physician/advanced practitioner if interventions unsuccessful or patient reports new pain   Outcome: Progressing      Problem: INFECTION - ADULT  Goal: Absence or prevention of progression during hospitalization  INTERVENTIONS:  - Assess and monitor for signs and symptoms of infection  - Monitor lab/diagnostic results  - Monitor all insertion sites, i e  indwelling lines, tubes, and drains  - Monitor endotracheal (as able) and nasal secretions for changes in amount and color  - [place] appropriate cooling/warming therapies per order  - Administer medications as ordered  - Instruct and encourage patient and family to use good hand hygiene technique  - Identify and instruct in appropriate isolation precautions for identified infection/condition   Outcome: Progressing    Goal: Absence of fever/infection during neutropenic period  INTERVENTIONS:  - Monitor WBC  - Implement neutropenic guidelines   Outcome: Progressing      Problem: SAFETY ADULT  Goal: Maintain or return to baseline ADL function  INTERVENTIONS:  -  Assess patient's ability to carry out ADLs; assess patient's baseline for ADL function and identify physical deficits which impact ability to perform ADLs (bathing, care of mouth/teeth, toileting, grooming, dressing, etc )  - Assess/evaluate cause of self-care deficits   - Assess range of motion  - Assess patient's mobility; develop plan if impaired  - Assess patient's need for assistive devices and provide as appropriate  - Encourage maximum independence but intervene and supervise when necessary  ¯ Involve family in performance of ADLs  ¯ Assess for home care needs following discharge   ¯ Request OT consult to assist with ADL evaluation and planning for discharge  ¯ Provide patient education as appropriate   Outcome: Progressing    Goal: Maintain or return mobility status to optimal level  INTERVENTIONS:  - Assess patient's baseline mobility status (ambulation, transfers, stairs, etc )    - Identify cognitive and physical deficits and behaviors that affect mobility  - Identify mobility aids required to assist with transfers and/or ambulation (gait belt, sit-to-stand, lift, walker, cane, etc )  - [place] fall precautions as indicated by assessment  - Record patient progress and toleration of activity level on Mobility SBAR; progress patient to next Phase/Stage  - Instruct patient to call for assistance with activity based on assessment  - Request Rehabilitation consult to assist with strengthening/weightbearing, etc    Outcome: Progressing      Problem: DISCHARGE PLANNING  Goal: Discharge to home or other facility with appropriate resources  INTERVENTIONS:  - Identify barriers to discharge w/patient and caregiver  - Arrange for needed discharge resources and transportation as appropriate  - Identify discharge learning needs (meds, wound care, etc )  - Arrange for interpretive services to assist at discharge as needed  - Refer to Case Management Department for coordinating discharge planning if the patient needs post-hospital services based on physician/advanced practitioner order or complex needs related to functional status, cognitive ability, or social support system   Outcome: Progressing      Problem: Knowledge Deficit  Goal: Patient/family/caregiver demonstrates understanding of disease process, treatment plan, medications, and discharge instructions  Complete learning assessment and assess knowledge base  Interventions:  - Provide teaching at level of understanding  - Provide teaching via preferred learning methods   Outcome: Progressing      Problem: CARDIOVASCULAR - ADULT  Goal: Maintains optimal cardiac output and hemodynamic stability  INTERVENTIONS:  - Monitor I/O, vital signs and rhythm  - Monitor for S/S and trends of decreased cardiac output i e  bleeding, hypotension  - Administer and titrate ordered vasoactive medications to optimize hemodynamic stability  - Assess quality of pulses, skin color and temperature  - Assess for signs of decreased coronary artery perfusion - ex   Angina  - Instruct patient to report change in severity of symptoms   Outcome: Progressing      Problem: METABOLIC, FLUID AND ELECTROLYTES - ADULT  Goal: Electrolytes maintained within normal limits  INTERVENTIONS:  - Monitor labs and assess patient for signs and symptoms of electrolyte imbalances  - Administer electrolyte replacement as ordered  - Monitor response to electrolyte replacements, including repeat lab results as appropriate  - Instruct patient on fluid and nutrition as appropriate   Outcome: Progressing    Goal: Fluid balance maintained  INTERVENTIONS:  - Monitor labs and assess for signs and symptoms of volume excess or deficit  - Monitor I/O and WT  - Instruct patient on fluid and nutrition as appropriate   Outcome: Progressing      Problem: SKIN/TISSUE INTEGRITY - ADULT  Goal: Skin integrity remains intact  INTERVENTIONS  - Identify patients at risk for skin breakdown  - Assess and monitor skin integrity  - Assess and monitor nutrition and hydration status  - Monitor labs (i e  albumin)  - Assess for incontinence   - Turn and reposition patient  - Assist with mobility/ambulation  - Relieve pressure over bony prominences  - Avoid friction and shearing  - Provide appropriate hygiene as needed including keeping skin clean and dry  - Evaluate need for skin moisturizer/barrier cream  - Collaborate with interdisciplinary team (i e  Nutrition, Rehabilitation, etc )   - Patient/family teaching   Outcome: Progressing    Goal: Incision(s), wounds(s) or drain site(s) healing without S/S of infection  INTERVENTIONS  - Assess and document risk factors for skin impairment   - Assess and document dressing, incision, wound bed, drain sites and surrounding tissue  - Initiate Nutrition services consult and/or wound management as needed   Outcome: Progressing    Goal: Oral mucous membranes remain intact  INTERVENTIONS  - Assess oral mucosa and hygiene practices  - Implement preventative oral hygiene regimen  - Implement oral medicated treatments as ordered  - Initiate Nutrition services referral as needed   Outcome: Progressing      Problem: SAFETY,RESTRAINT: NV/NON-SELF DESTRUCTIVE BEHAVIOR  Goal: Remains free of harm/injury (restraint for non violent/non self-detsructive behavior)  INTERVENTIONS:  - Instruct patient/family regarding restraint use   - Assess and monitor physiologic and psychological status   - Provide interventions and comfort measures to meet assessed patient needs   - Identify and implement measures to help patient regain control  - Assess readiness for release of restraint    Outcome: Progressing    Goal: Returns to optimal restraint-free functioning  INTERVENTIONS:  - Assess the patient's behavior and symptoms that indicate continued need for restraint  - Identify and implement measures to help patient regain control  - Assess readiness for release of restraint    Outcome: Progressing      Problem: Prexisting or High Potential for Compromised Skin Integrity  Goal: Skin integrity is maintained or improved  INTERVENTIONS:  - Identify patients at risk for skin breakdown  - Assess and monitor skin integrity  - Assess and monitor nutrition and hydration status  - Monitor labs (i e  albumin)  - Assess for incontinence   - Turn and reposition patient  - Assist with mobility/ambulation  - Relieve pressure over bony prominences  - Avoid friction and shearing  - Provide appropriate hygiene as needed including keeping skin clean and dry  - Evaluate need for skin moisturizer/barrier cream  - Collaborate with interdisciplinary team (i e  Nutrition, Rehabilitation, etc )   - Patient/family teaching   Outcome: Progressing      Problem: Nutrition/Hydration-ADULT  Goal: Nutrient/Hydration intake appropriate for improving, restoring or maintaining nutritional needs  Monitor and assess patient's nutrition/hydration status for malnutrition (ex- brittle hair, bruises, dry skin, pale skin and conjunctiva, muscle wasting, smooth red tongue, and disorientation)  Collaborate with interdisciplinary team and initiate plan and interventions as ordered  Monitor patient's weight and dietary intake as ordered or per policy  Utilize nutrition screening tool and intervene per policy  Determine patient's food preferences and provide high-protein, high-caloric foods as appropriate       INTERVENTIONS:  - Monitor intake, urinary output, labs, and treatment plans  - Assess nutrition and hydration status and recommend course of action  - Recommend/encourage appropriate nutritional supplements, and vitamin/mineral supplements  - Order, calculate, and assess calorie counts as needed  - Recommend, monitor, and adjust tube feedings based on assessed needs  - Assess need for intravenous fluids  - Provide specific nutrition/hydration education as appropriate  - Include patient/family/caregiver in decisions related to nutrition    Outcome: Progressing      Problem: DISCHARGE PLANNING - CARE MANAGEMENT  Goal: Discharge to post-acute care or home with appropriate resources  INTERVENTIONS:  - Conduct assessment to determine patient/family and health care team treatment goals, and need for post-acute services based on payer coverage, community resources, and patient preferences, and barriers to discharge  - Address psychosocial, clinical, and financial barriers to discharge as identified in assessment in conjunction with the patient/family and health care team  - Arrange appropriate level of post-acute services according to patient's   needs and preference and payer coverage in collaboration with the physician and health care team  - Communicate with and update the patient/family, physician, and health care team regarding progress on the discharge plan  - Arrange appropriate transportation to post-acute venues  Return to [facility] when medically stable      Outcome: Progressing      Problem: RESPIRATORY - ADULT  Goal: Achieves optimal ventilation and oxygenation  INTERVENTIONS:  - Assess for changes in respiratory status  - Assess for changes in mentation and behavior  - Position to facilitate oxygenation and minimize respiratory effort  - Oxygen administration by appropriate delivery method based on oxygen saturation (per order) or ABGs    - Encourage broncho-pulmonary hygiene including cough, deep breathe, Incentive Spirometry  - Assess the need for suctioning and aspirate as needed  - Assess and instruct to report SOB or any respiratory difficulty  - Respiratory Therapy support as indicated  Nebulizer therapy as ordered   CPT by VEST as ordered and suction    Outcome: Progressing

## 2018-10-11 NOTE — PROGRESS NOTES
Progress Note - Nephrology   King Sandoval 52 y o  male MRN: [id number]  Unit/Bed#: ICU 04 Encounter: [id number]    Assessment:  1  Acute renal failure/acute kidney injury: This is secondary to ATN creatinine is decreased to 1 9 was 2 4 yesterday  Creatinine levels continue to improve  Had been as high as the mid 3 range  2  Hypernatremia:  Patient is on 250 cc q 4 hours free water flushes  Sodium levels stated 148  We could probably increase to 300 cc q 4 hours; patient does not examine to be fluid overloaded  Other electrolytes are stable  Potassium is 3 9 magnesium is 1 9    Plan:  As above, kidney function is improving  Subjective:   Patient seen in follow-up for acute renal failure  Patient resting comfortably on nasal cannula  No acute events overnight; urine output 2 6 L range of systolic blood pressure [phone] systolic  Objective:     Vitals: Blood pressure 108/61, pulse 76, temperature 97 9 °F (36 6 °C), temperature source Temporal, resp  rate 15, height 5' 6" (1 676 m), weight 71 8 kg (158 lb 4 6 oz), SpO2 97 %  ,Body mass index is 25 55 kg/m²  Weight (last 2 days)     Date/Time   Weight    10/09/18 0319  71 8 (158 29)                Intake/Output Summary (Last 24 hours) at 10/11/18 0706  Last data filed at 10/11/18 0601   Gross per 24 hour   Intake             2328 ml   Output             2660 ml   Net             -332 ml       Urethral Catheter 18 Fr   (Active)       Physical Exam: General: patient is in NAD  Skin:  No new rash noted patient does have microcephaly  Eyes:  No scleral icterus  Neck:  Supple no adenopathy  Chest:  Coarse breath sounds  CVS:  S1-S2  Abdomen:  Positive bowel sounds  Extremities:  No significant edema  Neuro:  Nonfocal                Prescriptions Prior to Admission   Medication    bisacodyl (DULCOLAX) 10 mg suppository    Docusate Sodium 50 MG/15ML LIQD    famotidine (PEPCID) 40 mg/5 mL suspension    ketoconazole (NIZORAL) 2 % cream    lacosamide (VIMPAT) 50 mg    lactobacillus acidophilus-bulgaricus (FLORANEX) tablet    Melatonin 5 MG TABS    Mouthwashes (BIOTENE DRY MOUTH) LIQD    Nutritional Supplements (NUTREN 1 5 PO)    polyethylene glycol (MIRALAX) 17 g packet    QUEtiapine (SEROquel) 25 mg tablet    QUEtiapine (SEROquel) 50 mg tablet    silver sulfadiazine (SILVADENE,SSD) 1 % cream       Current Facility-Administered Medications   Medication Dose Route Frequency    bisacodyl (DULCOLAX) rectal suppository 10 mg  10 mg Rectal Daily PRN    haloperidol lactate (HALDOL) injection 5 mg  5 mg Intravenous Once    heparin (porcine) subcutaneous injection 5,000 Units  5,000 Units Subcutaneous Q8H [address]    insulin lispro (HumaLOG) 100 units/mL subcutaneous injection 1-5 Units  1-5 Units Subcutaneous Q6H [address]    ipratropium-albuterol (DUO-NEB) 0 5-2 5 mg/3 mL inhalation solution 3 mL  3 mL Nebulization Q6H    lacosamide (VIMPAT) tablet 100 mg  100 mg Per G Tube Q12H [address]    lactobacillus acidophilus-bulgaricus (FLORANEX) packet 1 packet  1 packet Oral BID    melatonin tablet 6 mg  6 mg Oral HS    omeprazole (PRILOSEC) suspension 2 mg/mL  20 mg Oral Daily    polyethylene glycol (MIRALAX) packet 17 g  17 g Per G Tube Daily PRN        Lab, Imaging and other studies: I have personally reviewed pertinent labs    CBC: Lab Results   Component Value Date    WBC 10 00 10/10/2018    RBC 2 14 (L) 10/10/2018     CMP: Lab Results   Component Value Date     (H) 10/11/2018     (H) 10/11/2018    CO2 28 10/11/2018    BUN 84 (H) 10/11/2018    CREATININE 1 99 (H) 10/11/2018    CALCIUM 8 0 (L) 10/11/2018    AST 16 10/08/2018    ALT 16 10/08/2018    ALKPHOS 121 (H) 10/08/2018    EGFR 37 10/11/2018     Phosphorus:   Lab Results   Component Value Date    PHOS 5 2 (H) 10/11/2018     Magnesium:   Lab Results   Component Value Date    MG 1 9 10/11/2018     Urinalysis: Lab Results   Component Value Date    COLORU [place] 10/02/2018    SOTERO [place] 10/02/2018 SPECGRAV 1 010 10/02/2018    PHUR >=9 0 10/02/2018    LEUKOCYTESUR Large (A) 10/02/2018    NITRITE Negative 10/02/2018    PROTEINUA 100 (2+) (A) 10/02/2018    GLUCOSEU Negative 10/02/2018    KETONESU Negative 10/02/2018    BILIRUBINUR Interference- unable to analyze (A) 10/02/2018    BLOODU Large (A) 10/02/2018     BMP: Lab Results   Component Value Date    CO2 28 10/11/2018    BUN 84 (H) 10/11/2018    CREATININE 1 99 (H) 10/11/2018    CALCIUM 8 0 (L) 10/11/2018

## 2018-10-12 VITALS
WEIGHT: 158.29 LBS | OXYGEN SATURATION: 96 % | DIASTOLIC BLOOD PRESSURE: 71 MMHG | RESPIRATION RATE: 14 BRPM | TEMPERATURE: 98.8 F | HEART RATE: 71 BPM | SYSTOLIC BLOOD PRESSURE: 125 MMHG | BODY MASS INDEX: 25.44 KG/M2 | HEIGHT: 66 IN

## 2018-10-12 PROBLEM — G92.8 TOXIC METABOLIC ENCEPHALOPATHY: Status: RESOLVED | Noted: 2018-10-08 | Resolved: 2018-10-12

## 2018-10-12 PROBLEM — N17.9 ACUTE KIDNEY INJURY (HCC): Status: RESOLVED | Noted: 2018-10-02 | Resolved: 2018-10-12

## 2018-10-12 LAB
ANION GAP SERPL CALCULATED.3IONS-SCNC: 6 MMOL/L (ref 4–13)
BUN SERPL-MCNC: 65 MG/DL (ref 5–25)
CALCIUM SERPL-MCNC: 8 MG/DL (ref 8.3–10.1)
CHLORIDE SERPL-SCNC: 113 MMOL/L (ref 100–108)
CO2 SERPL-SCNC: 32 MMOL/L (ref 21–32)
CREAT SERPL-MCNC: 1.52 MG/DL (ref 0.6–1.3)
GFR SERPL CREATININE-BSD FRML MDRD: 51 ML/MIN/1.73SQ M
GLUCOSE SERPL-MCNC: 77 MG/DL (ref 65–140)
POTASSIUM SERPL-SCNC: 4 MMOL/L (ref 3.5–5.3)
SODIUM SERPL-SCNC: 151 MMOL/L (ref 136–145)

## 2018-10-12 PROCEDURE — 94640 AIRWAY INHALATION TREATMENT: CPT

## 2018-10-12 PROCEDURE — 94760 N-INVAS EAR/PLS OXIMETRY 1: CPT

## 2018-10-12 PROCEDURE — 99232 SBSQ HOSP IP/OBS MODERATE 35: CPT | Performed by: INTERNAL MEDICINE

## 2018-10-12 PROCEDURE — 80048 BASIC METABOLIC PNL TOTAL CA: CPT | Performed by: NURSE PRACTITIONER

## 2018-10-12 PROCEDURE — 99238 HOSP IP/OBS DSCHRG MGMT 30/<: CPT | Performed by: ANESTHESIOLOGY

## 2018-10-12 RX ORDER — POTASSIUM CHLORIDE 20MEQ/15ML
20 LIQUID (ML) ORAL ONCE
Status: DISCONTINUED | OUTPATIENT
Start: 2018-10-12 | End: 2018-10-12

## 2018-10-12 RX ORDER — IPRATROPIUM BROMIDE AND ALBUTEROL SULFATE 2.5; .5 MG/3ML; MG/3ML
3 SOLUTION RESPIRATORY (INHALATION)
Qty: 30 VIAL | Refills: 0 | Status: SHIPPED | OUTPATIENT
Start: 2018-10-12 | End: 2019-12-04 | Stop reason: ALTCHOICE

## 2018-10-12 RX ORDER — QUETIAPINE FUMARATE 25 MG/1
25 TABLET, FILM COATED ORAL EVERY MORNING
Status: DISCONTINUED | OUTPATIENT
Start: 2018-10-12 | End: 2018-10-12 | Stop reason: HOSPADM

## 2018-10-12 RX ORDER — CHOLECALCIFEROL (VITAMIN D3) 125 MCG
5 CAPSULE ORAL
Status: DISCONTINUED | OUTPATIENT
Start: 2018-10-12 | End: 2018-10-12

## 2018-10-12 RX ORDER — MAGNESIUM SULFATE HEPTAHYDRATE 40 MG/ML
2 INJECTION, SOLUTION INTRAVENOUS ONCE
Status: DISCONTINUED | OUTPATIENT
Start: 2018-10-12 | End: 2018-10-12

## 2018-10-12 RX ORDER — POLYETHYLENE GLYCOL 3350 17 G/17G
17 POWDER, FOR SOLUTION ORAL DAILY
Status: DISCONTINUED | OUTPATIENT
Start: 2018-10-12 | End: 2018-10-12 | Stop reason: HOSPADM

## 2018-10-12 RX ORDER — FAMOTIDINE 40 MG/5ML
40 POWDER, FOR SUSPENSION ORAL 2 TIMES DAILY
Status: DISCONTINUED | OUTPATIENT
Start: 2018-10-12 | End: 2018-10-12

## 2018-10-12 RX ADMIN — QUETIAPINE 25 MG: 25 TABLET ORAL at 08:28

## 2018-10-12 RX ADMIN — LACOSAMIDE 100 MG: 50 TABLET, FILM COATED ORAL at 08:29

## 2018-10-12 RX ADMIN — IPRATROPIUM BROMIDE AND ALBUTEROL SULFATE 3 ML: .5; 3 SOLUTION RESPIRATORY (INHALATION) at 08:00

## 2018-10-12 RX ADMIN — HEPARIN SODIUM 5000 UNITS: 5000 INJECTION, SOLUTION INTRAVENOUS; SUBCUTANEOUS at 05:07

## 2018-10-12 RX ADMIN — POLYETHYLENE GLYCOL 3350 17 G: 17 POWDER, FOR SOLUTION ORAL at 11:37

## 2018-10-12 RX ADMIN — LACTOBACILLUS ACIDOPHILUS / LACTOBACILLUS BULGARICUS 1 PACKET: 100 MILLION CFU STRENGTH GRANULES at 08:28

## 2018-10-12 RX ADMIN — Medication 20 MG: at 08:28

## 2018-10-12 RX ADMIN — IPRATROPIUM BROMIDE AND ALBUTEROL SULFATE 3 ML: .5; 3 SOLUTION RESPIRATORY (INHALATION) at 01:38

## 2018-10-12 NOTE — SOCIAL WORK
Discharge ordered  Pt will be transferred back to [facility] scheduled to transport via stretcher  Unit, [place] [id number] ([facility] guardian) aware of plan

## 2018-10-12 NOTE — PROGRESS NOTES
Daily Progress Note - Critical Care/ Stepdown   Angeles Sheikh 52 y o  male MRN: [id number]  Unit/Bed#: ICU 04 Encounter: [id number]    ______________________________________________________________________  Assessment:   Principal Problem (Resolved):    Septic shock ([facility] [address] )  Active Problems:    Pneumonia of right upper lobe due to infectious organism [facility])    Acute respiratory failure with hypoxia ([facility] [address] )    Acute kidney injury ([facility] [address] )    Urinary tract infection associated with catheterization of urinary tract (HCC)    ATN (acute tubular necrosis) ([facility])    Gram-negative bacteremia    Toxic metabolic encephalopathy    Anemia    Hypernatremia    Thrombocytopenia (HCC)    Pressure injury of right buttock, unstageable (HCC)    Seizure disorder ([facility])    Impulse control disorder    Down syndrome  Resolved Problems:    Leukocytosis      * Septic shock (HCC)resolved as of 10/10/2018   Assessment & Plan    · Admitted 10/2 with sepsis secondary to UTI and Pneumonia  · Improved and transferred to the floor on 10/4 , and return early morning 10/5 with hypoxia and hypotension  · White count continues to improve  Procalcitonin improving  · Became hypotensive  after intubation  Required high dose pressors and steroids were added  Started on high-dose vitamin C and thiamine  · Echo on 10/5/18: EF: 60%, no significant valvular dysfunction, no findings that would suggest moderate or severe pulmonary hypertension    · Procalcitonin on admission:  · Current Procalcitonin: 1 09  · BCX x2: Proteus  · Sputum cx unimpressive  · Influenza A/B: Negative  · UCx:  E coli, Morganella morganii, beta-hemolytic Streptococcus group G  · Repeat CXR on 10/8/18:  · Worsening bilateral infiltrates, left side greater than right, and moderate-sized right pleural effusion  · Pressors have been weaned off  · Completed 7 day course of cefepime  · Patient was extubated yesterday     Acute respiratory failure with hypoxia [facility])   Assessment & Plan    Likely secondary to healthcare associated pneumonia  as patient is from [facility]  Pt was intubated 10/5 for increase WOB and hypoxia  Extubated 10/9 to nasal canula  · Sputum culture: Mixed Respiratory Luann  · Repeat CXR on 10/8/18:  · Worsening bilateral infiltrates, left side greater than right, and moderate-sized right pleural effusion  · Repeat xray demonstrates slight improvement  · Weaning supplemental oxygen for O2 saturation greater than 92%       Pneumonia of right upper lobe due to infectious organism [facility])   Assessment & Plan    · Repeat CXR on 10/8/18:  · Worsening bilateral infiltrates, left side greater than right, and moderate-sized right pleural effusion  · Procalcitonin: down trended 1 09  · Influenza A/B: Negative  · BCx x2: Proteus  · Sputum CX/GS positive for respiratory luann  · Pt completed 7 day course of cefepime  · Continue aggressive pulmonary toilet  · NT suctioning as needed  · Continue chest PT  · Wean supplemental oxygen for O2 saturation greater than 92%           Acute kidney injury ([facility] [address] )   Assessment & Plan    · Likely secondary to acute illness/septic shock  · Baseline Cr: 0 9-1  · Cr on admission: 2 79 peaked 3 4  · Current Cr: 1 99 yesterday  · Diuresed with Lasix on 10/5 and 10/6 due to concern for volume overload  · Nephrology Recommendations Appreciated     Urinary tract infection associated with catheterization of urinary tract ([facility] [address] )   Assessment & Plan    · Pt with chronic suprapubic catheter  · Catheter exchanged on 10/8  · UA on admission:   · + Leukocytes, Large Blood, Innumerable WBC/Bacteria  · UCx with E coli     · Pt completed 7 day course of cefepime     Gram-negative bacteremia   Assessment & Plan    · Patient's blood cultures positive x2 for Proteus mirabilis and Morganella morganii  · Patient completed 7 day course of cefepime     ATN (acute tubular necrosis) [facility])   Assessment & Plan    · Improving  · Nephrology Following     Leukocytosisresolved as of 10/6/2018 Assessment & Plan    · Resolved  · Continues with bandemia   · WBC on admission: 20 74  · Current WBC: 9 9  · Will monitor with daily CBC     Toxic metabolic encephalopathy   Assessment & Plan    · Unsure of pt baseline status  Discussion with [facility] staff via telephone conversation: Pt is interactive, but mostly nonverbal  Will yell out things like, "shut up" and "ok", but does not converse  Pt can be combative at times  · Pt is more awake and interactive today  Will continue to hold Seroquel and add back     Hypernatremia   Assessment & Plan    · Patient's sodium level increased to 148 today  · Will increase free water flushes to 300 mL q 4 hours  · Nephrology recommendations appreciated     Anemia   Assessment & Plan    · Unclear whether this is acute versus chronic issue  · Patient's hemoglobin has been stable in the 7s  · Will continue monitor and trend  · Consider additional workup once acute illness resolved  Thrombocytopenia ([facility] [address] )   Assessment & Plan    · Platelets 899 on admission  · Improved to 90 today  · Famotidine discontinued  · Will continue to monitor and trend     Pressure injury of right buttock, unstageable (HCC)   Assessment & Plan    · POA  · Continue Daily Wound Care  · Frequent Turning and Offloading     Seizure disorder ([facility] [address] )   Assessment & Plan    · Continue home medications:   · Vimpat  · Lacosamide level 16 9     Down syndrome   Assessment & Plan    · At Baseline     Impulse control disorder   Assessment & Plan    · At Baseline  · Continue at home medication: Seroquel  · Agitation Control prn         Plan:    Neuro:   · Delirium: CAM ICU positive no   · Pain controlled with: NA  · Pain score: none  · Regulate sleep/wake cycle    CV:   · Rhythm: NSR  · Follow rhythm on telemetry    Pulm:   · Wean supplemental oxygen for O2 saturation greater than 92%    · Chest PT ordered: yes       GI:   · Nutrition/diet plan:  Continue tube feedings  · Stress ulcer prophylaxis: No prophylaxis needed  · Bowel regimen: Miralax and Dulcolax Suppository  · Last BM: 10/12 medium soft brown    FEN:   · Fluid/Diuretic plan: No intervention  · Goal 24 hour fluid balance:  Even  · Electrolytes repleted: not applicable  · Goal: K >2 3, Mag >2 0, and Phos >3 0    :   · Indwelling Godoy present:  Chronic suprapubic catheter  · Strict I&Os    ID:   · Abx ordered: Patient completed a 7 day course of cefepime  · Trend temps and WBC count    Heme:   · Hemoglobin has been stable  · Trend hgb and plts    Endo:   · Sliding scale insulin coverage  · Glycemic control plan: Blood glucose controlled on current regimen    Msk/Skin:  · Mobility goal:  Maintain skin integrity  · PT consult: yes  · OT consult: yes  · Frequent turning and off-loading    Family:  · Family updated within 24 hours: yes   · Family meeting planned today: no     VTE Prophylaxis:  · Pharmacologic Prophylaxis: Heparin  · Mechanical Prophylaxis: sequential compression device    Disposition: Discharge [address] [place] [address]    Code Status: Level 1 - Full Code    Counseling / Coordination of Care  Total time spent today 35 minutes  Greater than 50% of total time was spent with the patient and / or family counseling and / or coordination of care  A description of the counseling / coordination of care: As discussed with care team   ______________________________________________________________________    HPI/24hr events:   No acute events overnight  Plan for pt to be discharged back to [address] [place] [address] today      ______________________________________________________________________    Physical Exam:   Physical Exam   Constitutional: He appears well-developed and well-nourished  He is easily aroused  Nasal cannula in place  HENT:   Head: Normocephalic and atraumatic  Mouth/Throat: Oropharynx is clear and moist and mucous membranes are normal    Eyes: Pupils are equal, round, and reactive to light  EOM are normal    Neck: Normal range of motion  Neck supple     Cardiovascular: Normal rate and intact distal pulses  Pulmonary/Chest: Effort normal  No respiratory distress  He has decreased breath sounds in the right lower field and the left lower field  Abdominal: Soft  There is no tenderness  G tube present with some excoriation  Genitourinary:   Genitourinary Comments: Suprapubic cath intact   Musculoskeletal: Normal range of motion  Neurological: He is alert and easily aroused  Pt is non verbal occasionally, yells out simple words and phrases  He is alert  Does not follow commands  Moves all extremities equally, bilaterally  Skin: Skin is warm and dry  Nursing note and vitals reviewed  ______________________________________________________________________  Vitals:    10/12/18 0200 10/12/18 0400 10/12/18 0600 10/12/18 0619   BP: 96/63 104/57 137/63    Pulse: 72 72 73    Resp: 13 12 13    Temp:  97 8 °F (36 6 °C)     TempSrc:  Temporal     SpO2: 100% 98% 95% 92%   Weight:       Height:         Arterial Line BP: 114/56  Arterial Line MAP (mmHg): 76 mmHg     Temperature:   Temp (24hrs), Av 7 °F (36 5 °C), Min:97 4 °F (36 3 °C), Max:97 9 °F (36 6 °C)    Current Temperature: 97 8 °F (36 6 °C)    Weights:   IBW: 63 8 kg    Body mass index is 25 55 kg/m²    Weight (last 2 days)     None          Hemodynamic Monitoring:  N/A       Non-Invasive/Invasive Ventilation Settings:  Respiratory    Lab Data (Last 4 hours)    None         O2/Vent Data (Last 4 hours)    None              No results found for: PHART, OGA5SCX, PO2ART, HHX0EGC, I7UAMHNR, BEART, SOURCE  SpO2: SpO2: 92 %    Intake and Outputs:  I/O       10/10 07 - 10/11 0700 10/11 07 - 10/12 0700    I V  (mL/kg)  100 (1 4)    NG/GT 1150 1790    IV Piggyback  50    Feedings 1178 1200    Total Intake(mL/kg) 2328 (32 4) 3140 (43 7)    Urine (mL/kg/hr) 2660 (1 5) 1900 (1 1)    Total Output 2660 1900    Net -332 +1240          Unmeasured Stool Occurrence 4 x 3 x          Nutrition:        Diet Orders            Start     Ordered 10/11/18 0913  Diet Enteral/Parenteral; Tube Feeding No Oral Diet; Jevity 1 5; Continuous; 50; 300; Water; Every 4 hours  Diet effective now     Question Answer Comment   Diet Type Enteral/Parenteral    Enteral/Parenteral Tube Feeding No Oral Diet    Tube Feeding Formula: Jevity 1 5    Bolus/Cyclic/Continuous Continuous    Tube Feeding Goal Rate (mL/hr): 50    Tube Feeding water flush (mL): 300    Water Flush type: Water    Water flush frequency: Every 4 hours    RD to adjust diet per protocol? Yes        10/11/18 0912    10/02/18 1926  Room Service  Once     Question:  Type of Service  Answer:  Room Service- Not Appropriate    10/02/18 1925          Labs:     Results from last 7 days  Lab Units 10/11/18  0530 10/10/18  0436 10/09/18  0453   WBC Thousand/uL 8 33 10 00 9 90   HEMOGLOBIN g/dL 7 7* 7 3* 7 1*   HEMATOCRIT % 24 4* 22 8* 22 0*   PLATELETS Thousands/uL 90* 75* 62*   MONO PCT MAN % 10 5 5       Results from last 7 days  Lab Units 10/11/18  0530 10/10/18  0436 10/09/18  0452 10/08/18  0520 10/07/18  0545  10/05/18  1836   SODIUM mmol/L 148* 148* 144 141 141  < > 141   POTASSIUM mmol/L 3 9 3 9 4 2 4 6 4 0  < > 4 2   CHLORIDE mmol/L 113* 111* 108 106 105  < > 106   CO2 mmol/L 28 31 28 25 25  < > 26   BUN mg/dL 84* 93* 92* 85* 73*  < > 61*   CREATININE mg/dL 1 99* 2 40* 2 97* 3 35* 3 40*  < > 2 99*   CALCIUM mg/dL 8 0* 7 9* 8 0* 8 0* 8 2*  < > 8 2*   ALK PHOS U/L  --   --   --  121* 131*  --  142*   ALT U/L  --   --   --  16 18  --  21   AST U/L  --   --   --  16 20  --  25   < > = values in this interval not displayed      Results from last 7 days  Lab Units 10/11/18  0530 10/10/18  0436 10/09/18  0452   MAGNESIUM mg/dL 1 9 2 0 2 1     Lab Results   Component Value Date    PHOS 5 2 (H) 10/11/2018    PHOS 5 9 (H) 10/10/2018    PHOS 6 5 (H) 10/09/2018          No results found for: TROPONINI      ABG:  Lab Results   Component Value Date    PHART 7 371 10/06/2018    GUE1WMD 45 0 (H) 10/06/2018    PO2ART 77 9 10/06/2018    XGO4RFQ 25 5 10/06/2018    BEART 0 1 10/06/2018    SOURCE Line, Arterial 10/05/2018       Imaging:   XR chest portable   Final Result      Continued right-sided pleural effusion and bilateral pulmonary airspace disease without significant change            Workstation performed: [id number]         XR chest portable   Final Result      Bilateral pneumonia  Right pleural effusion  Workstation performed: [id number]         XR chest portable   Final Result   Worsening bilateral infiltrates, left side greater than right, and moderate-sized right pleural effusion  Workstation performed: [id number]         XR chest portable   Final Result   1  Slight progression of bilateral infiltrates  2   Slight improvement in right pleural effusion  Workstation performed: [id number]         XR chest portable ICU   Final Result   1  Left internal jugular central venous catheter is to the left of the spine  Please see follow-up chest radiograph report  2   Bilateral lung opacities as above  Workstation performed: [id number]         XR chest portable   Final Result   1  Left internal jugular central venous catheter is either within a duplicated SVC or within the arterial system  I discussed this with Ez Richardson 10/5/2018 at approximately 8:40 PM    2   Large right pleural effusion is unchanged  Left lung opacity is unchanged  Workstation performed: [id number]         XR chest portable   Final Result   Endotracheal tube tip projects over the trachea just above the wilda  Increasing right-sided pleural effusion with persistent left-sided airspace opacities noted  Better upright repeat film suggested  Workstation performed: [id number]         US kidney and bladder   Final Result      1   Echogenic kidneys consistent with chronic medical renal disease  2   Bilateral nonobstructing renal calculi        3   Small right renal cyst       4   Diffusely thick walled bladder though completely collapsed  Correlation for cystitis recommended  5   Mild free fluid  Workstation performed: [id number]         XR chest portable   Final Result      Prominence of the cardiac silhouette with increasing perihilar airspace opacities and bilateral pleural effusions/atelectasis, suspect worsening fluid overload/CHF  Superimposed infection could be considered in the appropriate clinical setting  No other significant interval change  Workstation performed: [id number]         XR chest portable   Final Result      Stable right-sided pneumonia with parapneumonic effusion  New left-sided opacity which may also be infectious  The study was marked in [facility] for immediate notification  Workstation performed: [id number]         XR chest portable - 1 view   Final Result      Diffuse pneumonia in the right lung  Suspected right-sided pleural effusion  Workstation performed: [id number]            I have personally reviewed pertinent reports        EKG: NSR on monitor    Micro:  Lab Results   Component Value Date    BLOODCX Proteus mirabilis (A) 10/02/2018    BLOODCX Morganella morganii (A) 10/02/2018    BLOODCX Proteus mirabilis (A) 10/02/2018    BLOODCX Morganella morganii (A) 10/02/2018    URINECX >100,000 cfu/ml Escherichia coli (A) 10/02/2018    URINECX >100,000 cfu/ml Morganella morganii (A) 10/02/2018    URINECX (A) 10/02/2018     >100,000 cfu/ml Beta Hemolytic Streptococcus Group G    SPUTUMCULTUR 1+ Growth of  10/05/2018       Allergies: No Known Allergies  Medications:   Scheduled Meds:  Current Facility-Administered Medications:  bisacodyl 10 mg Rectal Daily PRN Roseanne Hamm MD   heparin (porcine) 5,000 Units Subcutaneous Q8H [address] Roseanne Hamm MD   ipratropium-albuterol 3 mL Nebulization Levotacho Lopez MD   lacosamide 100 mg Per G Tube Q12H [address] Roseanne Hamm MD   lactobacillus acidophilus-bulgaricus 1 packet Oral BID Roseanne Hamm MD melatonin 6 mg Oral HS REMEDIOS Andres   omeprazole (PRILOSEC) suspension 2 mg/mL 20 mg Oral Daily Roseanne Hamm MD   polyethylene glycol 17 g Per G Tube Daily PRN Roseanne Hamm MD   QUEtiapine 50 mg Per G Tube HS REMEDIOS Jaimes     Continuous Infusions:   PRN Meds:    bisacodyl 10 mg Daily PRN   polyethylene glycol 17 g Daily PRN       Invasive lines and devices: Invasive Devices     Peripheral Intravenous Line            Long-Dwell Peripheral IV (Midline) 98/49/51 Right Basilic 8 days          Drain            Gastrostomy/Enterostomy Percutaneous endoscopic gastrostomy (PEG) LLQ -- days    Urethral Catheter 18 Fr  -- days    Suprapubic Catheter Latex; Double-lumen 16 Fr  3 days                   SIGNATURE: REMEDIOS Lopez  DATE: October 12, 2018

## 2018-10-12 NOTE — PROGRESS NOTES
Progress Note - Nephrology   Nevaeh Cee 52 y o  male MRN: [id number]  Unit/Bed#: ICU 04 Encounter: [id number]    Assessment:  1  Acute renal failure/acute kidney injury: This is secondary to ATN creatinine is decreased to 1 9 creatinine continues to improve a m  Labs pending    2  Hypernatremia:  Sodium level was 148 yesterday free water flushes increased to 300 cc every 4 hours follow-up on a m  Labs today    Plan:  As above, from a kidney standpoint patient is stable  Follow up on a m  Labs  I spoke with Tashia Harris this am from ICU team  Critical care input appreciated  Subjective:   Patient seen in follow-up for acute renal failure  Patient resting comfortably  No chest pressure or shortness of Breath no acute event overnight  Kidney function slowly improving    Objective:     Vitals: Blood pressure 137/63, pulse 73, temperature 97 8 °F (36 6 °C), temperature source Temporal, resp  rate 13, height 5' 6" (1 676 m), weight 71 8 kg (158 lb 4 6 oz), SpO2 92 %  ,Body mass index is 25 55 kg/m²  Weight (last 2 days)     None            Intake/Output Summary (Last 24 hours) at 10/12/18 0705  Last data filed at 10/12/18 0523   Gross per 24 hour   Intake             3140 ml   Output             1900 ml   Net             1240 ml       Urethral Catheter 18 Fr   (Active)       Physical Exam: General: patient is in NAD  Skin:  No new rash  Eyes:  No scleral icterus microcephaly noted   Neck:  Supple no adenopathy  Chest:  Coarse breath sounds  CVS:  S1-S2  Abdomen:  Feeding tube is patent  Extremities:  Trace leg edema  Neuro:  Nonfocal                Prescriptions Prior to Admission   Medication    bisacodyl (DULCOLAX) 10 mg suppository    Docusate Sodium 50 MG/15ML LIQD    famotidine (PEPCID) 40 mg/5 mL suspension    ketoconazole (NIZORAL) 2 % cream    lacosamide (VIMPAT) 50 mg    lactobacillus acidophilus-bulgaricus (FLORANEX) tablet    Melatonin 5 MG TABS    Mouthwashes (BIOTENE DRY MOUTH) LIQD    Nutritional Supplements (NUTREN 1 5 PO)    polyethylene glycol (MIRALAX) 17 g packet    QUEtiapine (SEROquel) 25 mg tablet    QUEtiapine (SEROquel) 50 mg tablet    silver sulfadiazine (SILVADENE,SSD) 1 % cream       Current Facility-Administered Medications   Medication Dose Route Frequency    bisacodyl (DULCOLAX) rectal suppository 10 mg  10 mg Rectal Daily PRN    heparin (porcine) subcutaneous injection 5,000 Units  5,000 Units Subcutaneous Q8H [address]    ipratropium-albuterol (DUO-NEB) 0 5-2 5 mg/3 mL inhalation solution 3 mL  3 mL Nebulization Q6H    lacosamide (VIMPAT) tablet 100 mg  100 mg Per G Tube Q12H [address]    lactobacillus acidophilus-bulgaricus (FLORANEX) packet 1 packet  1 packet Oral BID    melatonin tablet 6 mg  6 mg Oral HS    omeprazole (PRILOSEC) suspension 2 mg/mL  20 mg Oral Daily    polyethylene glycol (MIRALAX) packet 17 g  17 g Per G Tube Daily PRN    QUEtiapine (SEROquel) tablet 25 mg  25 mg Per G Tube QAM    QUEtiapine (SEROquel) tablet 50 mg  50 mg Per G Tube HS        Lab, Imaging and other studies: I have personally reviewed pertinent labs    CBC: Lab Results   Component Value Date    WBC 8 33 10/11/2018    RBC 2 27 (L) 10/11/2018     CMP: Lab Results   Component Value Date     (H) 10/11/2018     (H) 10/11/2018    CO2 28 10/11/2018    BUN 84 (H) 10/11/2018    CREATININE 1 99 (H) 10/11/2018    CALCIUM 8 0 (L) 10/11/2018    AST 16 10/08/2018    ALT 16 10/08/2018    ALKPHOS 121 (H) 10/08/2018    EGFR 37 10/11/2018     Phosphorus:   Lab Results   Component Value Date    PHOS 5 2 (H) 10/11/2018     Magnesium:   Lab Results   Component Value Date    MG 1 9 10/11/2018     Urinalysis: Lab Results   Component Value Date    COLORU Rowan 10/02/2018    CLARITYU Cloudy 10/02/2018    SPECGRAV 1 010 10/02/2018    PHUR >=9 0 10/02/2018    LEUKOCYTESUR Large (A) 10/02/2018    NITRITE Negative 10/02/2018    PROTEINUA 100 (2+) (A) 10/02/2018    GLUCOSEU Negative 10/02/2018 KETONESU Negative 10/02/2018    BILIRUBINUR Interference- unable to analyze (A) 10/02/2018    BLOODU Large (A) 10/02/2018     BMP: Lab Results   Component Value Date    CO2 28 10/11/2018    BUN 84 (H) 10/11/2018    CREATININE 1 99 (H) 10/11/2018    CALCIUM 8 0 (L) 10/11/2018

## 2018-10-12 NOTE — [place] UNIVERSAL TRANSFER FORM
[place] UNIVERSAL TRANSFER FORM  (ALL ITEMS MUST BE COMPLETED)    1  TRANSFER FROM: [address]      TRANSFER TO: [place]    2  DATE OF TRANSFER: 10/12/2018                        TIME OF TRANSFER: 1300    3  PATIENT NAME: Michael Cuellar,        YOB: 1965                             GENDER: male    4  LANGUAGE:   English    5  PHYSICIAN NAME:  Patrizia Miranda MD                   PHONE: [phone]  CODE STATUS: Level 1 - Full Code        Out of Hospital DNR Attached: No    7  :                                      :  Extended Emergency Contact Information  Primary Emergency Contact: Keven Nickerson [address] Phone: [phone]  Work Phone: [phone]  Relation: Other  Secondary Emergency Contact: Haim Delmis [address] Phone: [phone]  Work Phone: [phone]  Mobile Phone: [phone]  Relation: None           Health Care Representative/Proxy:  Yes           Legal Guardian:  Yes             NAME OF:           HEALTH CARE REPRESENTATIVE/PROXY:                                         OR           LEGAL GUARDIAN, IF NOT :                                               PHONE:  (Day)           (Night)                        (Cell)    8  REASON FOR TRANSFER: (Must include brief medical history and recent changes in physical function or cognition ) discharge to home            V/S: /66 (BP Location: Left arm)   Pulse 80   Temp 98 8 °F (37 1 °C) (Temporal)   Resp 16   Ht 5' 6" (1 676 m)   Wt 71 8 kg (158 lb 4 6 oz)   SpO2 94%   BMI 25 55 kg/m²           PAIN: None    9  PRIMARY DIAGNOSIS: Septic shock ([address] )      Secondary Diagnosis:         Pacemaker: No      Internal Defib: No          Mental Health Diagnosis (if Applicable):    10  RESTRAINTS: No     11  RESPIRATORY NEEDS: Oxygen Device nasal cannula 2L,    12  ISOLATION/PRECAUTION: MRSA    15  ALLERGY: Patient has no known allergies  14  SENSORY:       Speech Aphasia    15  SKIN CONDITION: Yes:  Pressure    16  DIET: Tube Feed    17  IV ACCESS: None    18  PERSONAL ITEMS SENT WITH PATIENT: None    19  ATTACHED DOCUMENTS: MUST ATTACH CURRENT MEDICATION INFORMATION Face Sheet, MAR, Medication Reconciliation, TAR, POS, Labs, Respiratory Care, Discharge Summary and HX/PE    20  AT RISK ALERTS:Falls        HARM TO: N/A    21  WEIGHT BEARING STATUS:         Left Leg: Limited        Right Leg: Limited    22  MENTAL STATUS:Alert    23  FUNCTION:        Walk: Not Able        Transfer: With Help        Toilet: Not Able        Feed: Not Able    24  IMMUNIZATIONS/SCREENING:     There is no immunization history on file for this patient  25  BOWEL: Incontinent     26  BLADDER: Godoy Catheter    27   SENDING FACILITY CONTACT: [facility]                    Title: Marivel Hernandez RN        Unit: ICU        Phone: [phone] (if known):        Title:Yolanda MAR        Unit:         Phone:         FORM PREFILLED BY (if applicable)       Title:       Unit:        Phone:         Poncho Zapata RN      Title: RN      Phone: [phone]

## 2018-10-12 NOTE — NURSING NOTE
Pt leaving unit via stretcher  Accompanied by [address],[facility] transport  Pt's wheelchair remains in unit  [facility] staff aware that it needs to be picked up

## 2018-12-03 ENCOUNTER — PATIENT OUTREACH (OUTPATIENT)
Dept: OTHER | Facility: HOSPITAL | Age: 53
End: 2018-12-03

## 2018-12-28 ENCOUNTER — EPISODE CHANGES (OUTPATIENT)
Dept: CASE MANAGEMENT | Facility: HOSPITAL | Age: 53
End: 2018-12-28

## 2019-01-09 ENCOUNTER — PATIENT OUTREACH (OUTPATIENT)
Dept: CASE MANAGEMENT | Facility: OTHER | Age: 54
End: 2019-01-09

## 2019-02-08 ENCOUNTER — TRANSCRIBE ORDERS (OUTPATIENT)
Dept: ADMINISTRATIVE | Facility: HOSPITAL | Age: 54
End: 2019-02-08

## 2019-02-08 DIAGNOSIS — N20.0 URIC ACID NEPHROLITHIASIS: Primary | ICD-10-CM

## 2019-02-17 NOTE — PROGRESS NOTES
Daily Progress Note - Critical Care/ Stepdown   Lyndee Hashimoto 52 y o  male MRN: [id number]  Unit/Bed#: ICU 04 Encounter: [id number]    ______________________________________________________________________  Assessment:   Principal Problem:    Septic shock ([address] )  Active Problems:    Acute respiratory failure with hypoxia ([facility])    Pneumonia of right upper lobe due to infectious organism ([address] )    Acute kidney injury ([address] )    Urinary tract infection associated with catheterization of urinary tract ([address] )    Pressure injury of right buttock, unstageable (HCC)    Seizure disorder ([facility])    Impulse control disorder    Down syndrome    Pneumonia  Resolved Problems:    Leukocytosis      Acute respiratory failure with hypoxia ([address] )   Assessment & Plan    · Currently intubated on mechanical ventilation secondary to acute on chronic hypoxic and hypercapnic respiratory failure  Large amount of secretions  · Sputum culture with mixed respiratory luann  Still suction for large amounts of secretions  · Large right pleural effusion with left-sided opacity  Consider thoracentesis  Concern for inability to handle secretions, will add flagyl for the time being  Respiratory culture without evidence of MRSA  Discontinue vancomycin     * Septic shock ([address] )   Assessment & Plan    · Admitted 10/2 with sepsis secondary to UTI and pneumonia  · Improved and transferred to the floor on 10/4 , and return early morning 10/5 with hypoxia and hypotension  · White count continues to improve  Procalcitonin improving  · Became hypotensive  after intubation  Given 1 L normal saline and started on Levophed  Levophed use escalated to 15 at, at that time vasopressin and steroids were added  Initially started on high-dose vitamin C and thiamine, however in light of renal dysfunction will hold on further ascorbic acid    · Echocardiogram with normal EF no significant valvular dysfunction, no findings that would suggest moderate or severe pulmonary hypertension  · Procalcitonin on admission: 10 72, history 2 5  Continues with bandemia  · BCX x2: Proteus  · Influenza A/B: Negative  · Sputum Cx and GS: Pending  · UCx: E coli  · Continue Day#6: Cefepime, day 4 Flagyl, discontinue vancomycin       Pneumonia of right upper lobe due to infectious organism [facility])   Assessment & Plan    · Right sided pneumonia with large pleural effusion and left sided infiltrate blossoming as well  · Repeat poor procalcitonin in a m  · Influenza A/B: Negative  · BCx x2: proteus  · Sputum CX/GS positive for respiratory luann  · Continue Day#6: Cefepime, day 4 flagyl       Acute kidney injury ([facility] )   Assessment & Plan    · Baseline Cr: 0 9-1  · Cr on admission: 2 79 - continues to worsen -  now 3 37  · Will monitor with daily CMP  · Nephrology following  · Pt appears to be volume overloaded  Will need diuresis despite low dose pressor requirement  Urinary tract infection associated with catheterization of urinary tract (HCC)   Assessment & Plan    · UA on admission:   · + Leukocytes, Large Blood, Innumerable WBC/Bacteria  · UCx with E coli  ? Colonizer given suprapubic catheter  · Hx of Suprapubic Catheter   · Continue ABx therapy As Above     Leukocytosisresolved as of 10/6/2018   Assessment & Plan    · Resolved  · Continues with bandemia   · WBC on admission: 20 74  · Current WBC: 13 51  · Will monitor with daily CBC     Pressure injury of right buttock, unstageable (HCC)   Assessment & Plan    · POA  · Continue Daily Wound Care  · Frequent Turning and Offloading     Seizure disorder ([facility] )   Assessment & Plan    · Continue home medications:   · Seroquel 25mg qd morning and 50mg qd bedtime     Down syndrome   Assessment & Plan    · At Baseline     Impulse control disorder   Assessment & Plan    · At Baseline  · Agitation Control prn         Plan:    Neuro:   · Sedation plan: Fentanyl  ? RASS goal: -1 Drowsy and 0 Alert and Calm  ?  Sedation break plan: daily · Delirium: CAM ICU positive no   ? If yes, intervention:  Environmental controls  Regulate sleep-wake cycle  · Pain controlled with:  P r n  Fentanyl  ? Pain score: none  · Regulate sleep/wake cycle     CV:   · Cardiac infusions:  Levophed and vasopressin weaned off overnight  Levophed re-initiated and currently at 5 mics for hypotension  Possibly related to sedation  Discontinue propofol  Continue with p r n  Fentanyl and fentanyl drip and reassess  · Rhythm: NSR  ? Follow rhythm on telemetry     Pulm:   · Acute on chronic hypoxic and hypercarbic respiratory failure secondary to pneumonia and volume overload  · SBT plan:  Daily  ? Chest PT ordered: yes   ? Chlorhexidine ordered: yes   ? HOB >30 degrees: yes      GI:   · Nutrition/diet plan:  Tube feeding 50 mL/hr Jevity 1 5, free water flush 150 q 4 hours tolerating well  · Stress ulcer prophylaxis: Pepcid PO  · Bowel regimen: Miralax and Dulcolax Suppository  ? Last BM: 10/7     FEN:   · No IVF  · Fluid/Diuretic plan:  Patient received 40 Lasix overnight  Monitor urine output  Creatinine worsening  Nephrology following  ? Goal 24 hour fluid balance: Net even to net negative  · Electrolytes repleted: yes  ? Goal: K >4 0, Mag >2 0, and Phos >3 0     :   · Indwelling Godoy present: yes - Superpubic catheter  ? Urinary catheter still needed for Strict I and O in a critically ill patient and Chronic Godoy  · A KI on CKD:  Non oliguric  Creatinine worsening  Appreciate Nephrology recommendations  Electrolytes within limits no significant acidosis, no indication for urgent dialysis at this time        ID:   · Severe sepsis with septic shock secondary to UTI and pneumonia  · Abx ordered: Cefepime, Flagyl and Vanco  · Trend temps and WBC count     Heme:   · Anemia:  Hemoglobin 6 5  Patient will require 1 unit packed red blood cells  hemoglobin responded appropriately is currently 7 5  Monitor    · Trend hgb and plts     Endo: · Blood sugars well controlled at this time  Goal under 180        Msk/Skin:  · Mobility goal:  Bed rest  ? PT and OT when patient's clinical condition improves  · Frequent turning and off-loading     Family:  · Family updated within 24 hours: yes Margot Valdovinos of guardianship Services has been contacted regarding patient's current clinical condition  · Family meeting planned today: no      Lines:  · Central venous access: triple lumen catheter - 20 cm  ? Keep central line today for meds requiring central line, hemodynamic monitoring  · Arterial line: Assessed  Continued for the following reasons Hemodynamic monitoring and frequent blood draws       VTE Prophylaxis:  · Pharmacologic Prophylaxis: Heparin  · Mechanical Prophylaxis: sequential compression device    Disposition: Continue ICU care    Code Status: Level 1 - Full Code    Counseling / Coordination of Care  Total Critical Care time spent 48 minutes excluding procedures, teaching and family updates  ______________________________________________________________________    HPI/24hr events:  Vasopressors weaned off for brief period of time overnight  Continues on low-dose Levophed  No other acute overnight events     ______________________________________________________________________    Physical Exam:   Physical Exam   Constitutional: He appears well-developed and well-nourished  No distress  HENT:   Head: Atraumatic  Microcephalic  Eyes: Pupils are equal, round, and reactive to light  No scleral icterus  Neck: Neck supple  JVD present  Cardiovascular: Normal rate and regular rhythm  No murmur heard  Pulmonary/Chest: Effort normal  No respiratory distress  Abdominal: Soft  He exhibits no distension  There is no tenderness  Musculoskeletal: He exhibits deformity  Multiple contractures   Neurological: No cranial nerve deficit  He exhibits abnormal muscle tone  Coordination abnormal    Skin: Skin is warm and dry   Capillary refill takes less than 2 seconds  He is not diaphoretic  Nursing note and vitals reviewed  ______________________________________________________________________  Vitals:    10/07/18 0700 10/07/18 0727 10/07/18 0800 10/07/18 0900   BP: 101/58  93/50 90/53   Pulse: 74 75 82 90   Resp: 18 (!) 27 14 13   Temp:       TempSrc:       SpO2: 95% 97% 97% 96%   Weight:       Height:         Arterial Line BP: 99/40  Arterial Line MAP (mmHg): 55 mmHg     Temperature:   Temp (24hrs), Av 2 °F (36 2 °C), Min:96 8 °F (36 °C), Max:98 1 °F (36 7 °C)    Current Temperature: 98 1 °F (36 7 °C)    Weights:   IBW: 63 8 kg    Body mass index is 24 55 kg/m²    Weight (last 2 days)     Date/Time   Weight    10/07/18 0600  69 (152 12)    10/06/18 06  67 7 (149 25)              Hemodynamic Monitoring:  N/A  CO:  [6 2 L/min-11 1 L/min] 8 8 L/min  CI:  [3 5 L/min/m2-6 5 L/min/m2] 6 5 L/min/m2    Non-Invasive/Invasive Ventilation Settings:  Respiratory    Lab Data (Last 4 hours)    None         O2/Vent Data (Last 4 hours)      10/07 0727 10/07 0746         Vent Mode AC/VC AC/VC      Resp Rate (BPM) (BPM)  14      Vt (mL) (mL)  400      FIO2 (%) (%)  40      PEEP (cmH2O) (cmH2O)  5      Patient safety screen outcome: Passed       MV  9 85      FIO2 (%) (%) 40       PEEP (cmH2O) (cmH2O) 5       Pressure Support (cmH2O) (cmH20) 10       MV (Obs) 4 59       RSBI 5                     Intake and Outputs:  I/O       10/05 0701 - 10/06 0700 10/06 0701 - 10/07 0700 10/07 0701 - 10/08 0700    I V  (mL/kg) 497 6 (7 4) 378 7 (5 5)     Blood  350     NG/GT  750     IV Piggyback 450 400     Feedings  950     Total Intake(mL/kg) 947 6 (14) 2828 7 (41)     Urine (mL/kg/hr) 1335 (0 8) 1025 (0 6)     Total Output 1335 1025      Net -387 4 +1803 7             Unmeasured Stool Occurrence  2 x             Nutrition:        Diet Orders            Start     Ordered    10/04/18 1718  Diet Enteral/Parenteral; Tube Feeding No Oral Diet; Jevity 1 5; Continuous; 50; 150; Water; Every 4 hours  Diet effective now     Comments:  Give at 50ml/hr until 1035 is infused in 22 hours with H2O flush of 150ml/hr q4h   Question Answer Comment   Diet Type Enteral/Parenteral    Enteral/Parenteral Tube Feeding No Oral Diet    Tube Feeding Formula: Jevity 1 5    Bolus/Cyclic/Continuous Continuous    Tube Feeding Goal Rate (mL/hr): 50    Tube Feeding water flush (mL): 150    Water Flush type: Water    Water flush frequency: Every 4 hours    RD to adjust diet per protocol? Yes        10/04/18 1717    10/02/18 1926  Room Service  Once     Question:  Type of Service  Answer:  Room Service- Not Appropriate    10/02/18 1925             Labs:     Results from last 7 days  Lab Units 10/07/18  0545 10/06/18  1658 10/06/18  0552 10/05/18  2107   WBC Thousand/uL 16 74*  --  9 97 12 12*   HEMOGLOBIN g/dL 7 5* 7 7* 6 9* 7 3*   HEMATOCRIT % 22 7* 23 9* 21 3* 23 1*   PLATELETS Thousands/uL 80*  --  81* 88*   MONO PCT MAN % 8  --  1* 15*       Results from last 7 days  Lab Units 10/07/18  0545 10/06/18  0552 10/05/18  1836  10/04/18  0433   SODIUM mmol/L 141 141 141  < > 137   POTASSIUM mmol/L 4 0 4 9 4 2  < > 4 7   CHLORIDE mmol/L 105 104 106  < > 104   CO2 mmol/L 25 24 26  < > 26   BUN mg/dL 73* 65* 61*  < > 60*   CREATININE mg/dL 3 40* 3 37* 2 99*  < > 2 74*   CALCIUM mg/dL 8 2* 8 3 8 2*  < > 8 3   ALK PHOS U/L 131*  --  142*  --  150*   ALT U/L 18  --  21  --  28   AST U/L 20  --  25  --  45   < > = values in this interval not displayed      Results from last 7 days  Lab Units 10/07/18  0545 10/06/18  0552 10/05/18  0247   MAGNESIUM mg/dL 2 3 2 3 2 6     Lab Results   Component Value Date    PHOS 5 8 (H) 10/07/2018    PHOS 5 9 (H) 10/06/2018    PHOS 4 8 (H) 10/05/2018        Results from last 7 days  Lab Units 10/02/18  1156   INR  1 25*   PTT seconds 30     No results found for: TROPONINI    Results from last 7 days  Lab Units 10/05/18  0350 10/02/18  1400 10/02/18  1155   LACTIC ACID mmol/L 1 0 1 4 3 3* ABG:  Lab Results   Component Value Date    PHART 7 371 10/06/2018    [id number] 45 0 (H) 10/06/2018    PO2ART 77 9 10/06/2018    [id number] 25 5 10/06/2018    BEART 0 1 10/06/2018    SOURCE Line, Arterial 10/05/2018       Imaging: CXR:   XR chest portable ICU   Final Result   1  Left internal jugular central venous catheter is to the left of the spine  Please see follow-up chest radiograph report  2   Bilateral lung opacities as above  Workstation performed: [id number]         XR chest portable   Final Result   1  Left internal jugular central venous catheter is either within a duplicated SVC or within the arterial system  I discussed this with Ember Rodriguez 10/5/2018 at approximately 8:40 PM    2   Large right pleural effusion is unchanged  Left lung opacity is unchanged  Workstation performed: [id number]         XR chest portable   Final Result   Endotracheal tube tip projects over the trachea just above the wilda  Increasing right-sided pleural effusion with persistent left-sided airspace opacities noted  Better upright repeat film suggested  Workstation performed: [id number]         US kidney and bladder   Final Result      1   Echogenic kidneys consistent with chronic medical renal disease  2   Bilateral nonobstructing renal calculi  3   Small right renal cyst       4   Diffusely thick walled bladder though completely collapsed  Correlation for cystitis recommended  5   Mild free fluid  Workstation performed: [id number]         XR chest portable   Final Result      Prominence of the cardiac silhouette with increasing perihilar airspace opacities and bilateral pleural effusions/atelectasis, suspect worsening fluid overload/CHF  Superimposed infection could be considered in the appropriate clinical setting  No other significant interval change            Workstation performed: [id number]         XR chest portable   Final Result      Stable right-sided pneumonia with parapneumonic effusion  New left-sided opacity which may also be infectious  The study was marked in [facility] for immediate notification  Workstation performed: [id number]         XR chest portable - 1 view   Final Result      Diffuse pneumonia in the right lung  Suspected right-sided pleural effusion              Workstation performed: [id number]         XR chest portable    (Results Pending)   XR chest portable    (Results Pending)           Micro:  Lab Results   Component Value Date    BLOODCX Proteus mirabilis (A) 10/02/2018    BLOODCX Morganella morganii (A) 10/02/2018    BLOODCX Proteus mirabilis (A) 10/02/2018    BLOODCX Morganella morganii (A) 10/02/2018    URINECX >100,000 cfu/ml Escherichia coli (A) 10/02/2018    URINECX >100,000 cfu/ml Morganella morganii (A) 10/02/2018    URINECX (A) 10/02/2018     >100,000 cfu/ml Beta Hemolytic Streptococcus Group G    SPUTUMCULTUR 1+ Growth of  10/05/2018       Allergies: No Known Allergies  Medications:   Scheduled Meds:    Current Facility-Administered Medications:  acetylcysteine 3 mL Nebulization Q12H Gretel Zhang MD    bisacodyl 10 mg Rectal Daily PRN Ulises Godoy MD    calcium gluconate 50 mL IVPB 2 g Intravenous Once Mara Gonzalez PA-C    cefepime 1,000 mg Intravenous Q24H Ulises Godoy MD Last Rate: Stopped (10/06/18 1430)   chlorhexidine 15 mL Swish & Spit Q12H [address] Corby Faust PA-C    famotidine 20 mg Per G Tube Daily Mara Gonzalez PA-C    fentaNYL 50 mcg Intravenous Q2H PRN Corby Faust PA-C    fentaNYL 25 mcg/hr Intravenous Continuous Lelon , CRNP Last Rate: 25 mcg/hr (10/07/18 0357)   fentanyl citrate (PF) 25 mcg Intravenous Q2H PRN Corby Faust PA-C    heparin (porcine) 5,000 Units Subcutaneous Q8H 418 Calos Johnson MD    hydrocortisone sodium succinate 50 mg Intravenous Q6H [address] Mara Gonzalez PA-C    insulin lispro 1-5 Units Subcutaneous Q6H [address] Mia R REMEDIOS Ramirez    ipratropium-albuterol 3 mL Nebulization Keitha Bosworth, MD    lacosamide 100 mg Per G Tube Q12H Nadine Pierre MD    lactobacillus acidophilus-bulgaricus 1 packet Oral BID Estela Never, MD    metroNIDAZOLE 500 mg Intravenous Q8H Neetu Melvin PA-C Last Rate: Stopped (10/07/18 0866)   mupirocin  Nasal Q12H Nadine Pierre MD    norepinephrine 1-30 mcg/min Intravenous Titrated Amy Garsia PA-C Last Rate: 5 mcg/min (10/07/18 0921)   polyethylene glycol 17 g Per G Tube Daily Estela Vasquez, MD    QUEtiapine 25 mg Per Daleen Mobile, MD    QUEtiapine 50 mg Per G Tube HS Estela Never, MD    thiamine 200 mg Intravenous Daily Amy Garsia PA-C Last Rate: Stopped (10/07/18 1026)   vasopressin (PITRESSIN) in 0 9 % sodium chloride 100 mL 0 04 Units/min Intravenous Continuous Amy Garsia PA-C Last Rate: Stopped (10/07/18 0417)     Continuous Infusions:    fentaNYL 25 mcg/hr Last Rate: 25 mcg/hr (10/07/18 0357)   norepinephrine 1-30 mcg/min Last Rate: 5 mcg/min (10/07/18 0921)   vasopressin (PITRESSIN) in 0 9 % sodium chloride 100 mL 0 04 Units/min Last Rate: Stopped (10/07/18 0417)     PRN Meds:    bisacodyl 10 mg Daily PRN   fentaNYL 50 mcg Q2H PRN   fentanyl citrate (PF) 25 mcg Q2H PRN       Invasive lines and devices:   Invasive Devices     Central Venous Catheter Line            CVC Central Lines 10/05/18 Triple 20cm 1 day          Peripheral Intravenous Line            Peripheral IV 10/02/18 Left Antecubital 4 days    Long-Dwell Peripheral IV (Midline) 53/55/87 Right Basilic 3 days          Arterial Line            Arterial Line 10/05/18 Right Radial 1 day          Drain            Gastrostomy/Enterostomy Percutaneous endoscopic gastrostomy (PEG) LLQ -- days    Suprapubic Catheter 18 Fr  -- days    Urethral Catheter 18 Fr  -- days          Airway            ETT  Hi-Lo 8 mm 1 day                   SIGNATURE: Amy Garsia PA-C  DATE: October 7, 2018 - - -

## 2019-02-27 ENCOUNTER — ANESTHESIA EVENT (OUTPATIENT)
Dept: RADIOLOGY | Facility: HOSPITAL | Age: 54
End: 2019-02-27

## 2019-02-27 ENCOUNTER — HOSPITAL ENCOUNTER (OUTPATIENT)
Dept: RADIOLOGY | Facility: HOSPITAL | Age: 54
Discharge: HOME | End: 2019-02-27
Payer: MEDICARE

## 2019-02-27 ENCOUNTER — ANESTHESIA (OUTPATIENT)
Dept: RADIOLOGY | Facility: HOSPITAL | Age: 54
End: 2019-02-27

## 2019-02-27 VITALS
WEIGHT: 158 LBS | HEIGHT: 66 IN | TEMPERATURE: 97.1 F | HEART RATE: 62 BPM | DIASTOLIC BLOOD PRESSURE: 82 MMHG | BODY MASS INDEX: 25.39 KG/M2 | RESPIRATION RATE: 18 BRPM | SYSTOLIC BLOOD PRESSURE: 135 MMHG | OXYGEN SATURATION: 95 %

## 2019-02-27 DIAGNOSIS — N20.0 URIC ACID NEPHROLITHIASIS: ICD-10-CM

## 2019-02-27 PROCEDURE — 74176 CT ABD & PELVIS W/O CONTRAST: CPT

## 2019-02-27 RX ORDER — MIDAZOLAM HYDROCHLORIDE 1 MG/ML
INJECTION INTRAMUSCULAR; INTRAVENOUS AS NEEDED
Status: DISCONTINUED | OUTPATIENT
Start: 2019-02-27 | End: 2019-02-27 | Stop reason: SURG

## 2019-02-27 RX ORDER — KETAMINE HYDROCHLORIDE 50 MG/ML
INJECTION, SOLUTION, CONCENTRATE INTRAMUSCULAR; INTRAVENOUS AS NEEDED
Status: DISCONTINUED | OUTPATIENT
Start: 2019-02-27 | End: 2019-02-27 | Stop reason: SURG

## 2019-02-27 RX ORDER — SODIUM CHLORIDE 9 MG/ML
125 INJECTION, SOLUTION INTRAVENOUS CONTINUOUS
Status: DISCONTINUED | OUTPATIENT
Start: 2019-02-27 | End: 2019-02-28 | Stop reason: HOSPADM

## 2019-02-27 RX ADMIN — KETAMINE HYDROCHLORIDE 100 MG: 50 INJECTION INTRAMUSCULAR; INTRAVENOUS at 12:17

## 2019-02-27 RX ADMIN — MIDAZOLAM HYDROCHLORIDE 2 MG: 1 INJECTION, SOLUTION INTRAMUSCULAR; INTRAVENOUS at 12:17

## 2019-02-27 NOTE — ANESTHESIA POSTPROCEDURE EVALUATION
Post-Op Assessment Note    CV Status:  Stable  Pain Score: 0    Pain management: adequate     Mental Status:  Alert   Hydration Status:  Stable   PONV Controlled:  Controlled   Airway Patency:  Patent   Post Op Vitals Reviewed: Yes      Staff: CRNA           BP      Temp     Pulse     Resp      SpO2

## 2019-02-27 NOTE — ANESTHESIA PREPROCEDURE EVALUATION
Review of Systems/Medical History  Patient summary reviewed  Chart reviewed  No history of anesthetic complications     Cardiovascular  DVT   Pulmonary  Pneumonia,        GI/Hepatic    GERD ,   Comment: PEG TUBE PLACEMENT     Kidney disease ARF,        Endo/Other     GYN       Hematology  Anemia ,  Thrombocytopenia,    Musculoskeletal       Neurology  Seizures ,    Comment: Down syndrome Psychology           Physical Exam    Airway    Mallampati score: II  TM Distance: >3 FB  Neck ROM: full     Dental       Cardiovascular  Rhythm: regular, Rate: normal, Cardiovascular exam normal    Pulmonary  Pulmonary exam normal Breath sounds clear to auscultation,     Other Findings  Large tongue      Anesthesia Plan  ASA Score- 3     Anesthesia Type- IV sedation with anesthesia with ASA Monitors  Additional Monitors:   Airway Plan:         Plan Factors-    Induction-     Postoperative Plan-     Informed Consent- Anesthetic plan and risks discussed with legal guardian  I personally reviewed this patient with the CRNA  Discussed and agreed on the Anesthesia Plan with the CRNA  Ike Brown

## 2019-03-18 ENCOUNTER — APPOINTMENT (EMERGENCY)
Dept: RADIOLOGY | Facility: HOSPITAL | Age: 54
End: 2019-03-18
Payer: MEDICARE

## 2019-03-18 ENCOUNTER — HOSPITAL ENCOUNTER (EMERGENCY)
Facility: HOSPITAL | Age: 54
Discharge: HOME | End: 2019-03-19
Attending: EMERGENCY MEDICINE | Admitting: EMERGENCY MEDICINE
Payer: MEDICARE

## 2019-03-18 VITALS
TEMPERATURE: 96.9 F | OXYGEN SATURATION: 98 % | RESPIRATION RATE: 22 BRPM | SYSTOLIC BLOOD PRESSURE: 111 MMHG | DIASTOLIC BLOOD PRESSURE: 62 MMHG | HEART RATE: 64 BPM

## 2019-03-18 DIAGNOSIS — K59.00 CONSTIPATION: Primary | ICD-10-CM

## 2019-03-18 DIAGNOSIS — R93.89 INFILTRATE NOTED ON IMAGING STUDY: ICD-10-CM

## 2019-03-18 LAB
ALBUMIN SERPL BCP-MCNC: 2.4 G/DL (ref 3.5–5)
ALP SERPL-CCNC: 80 U/L (ref 46–116)
ALT SERPL W P-5'-P-CCNC: 7 U/L (ref 12–78)
ANION GAP SERPL CALCULATED.3IONS-SCNC: 8 MMOL/L (ref 4–13)
APTT PPP: 27 SECONDS (ref 26–38)
AST SERPL W P-5'-P-CCNC: 19 U/L (ref 5–45)
BASOPHILS # BLD AUTO: 0.17 THOUSANDS/ΜL (ref 0–0.1)
BASOPHILS NFR BLD AUTO: 2 % (ref 0–1)
BILIRUB SERPL-MCNC: 0.3 MG/DL (ref 0.2–1)
BUN SERPL-MCNC: 43 MG/DL (ref 5–25)
CALCIUM SERPL-MCNC: 9.1 MG/DL (ref 8.3–10.1)
CHLORIDE SERPL-SCNC: 107 MMOL/L (ref 100–108)
CO2 SERPL-SCNC: 27 MMOL/L (ref 21–32)
CREAT SERPL-MCNC: 1.72 MG/DL (ref 0.6–1.3)
EOSINOPHIL # BLD AUTO: 0.15 THOUSAND/ΜL (ref 0–0.61)
EOSINOPHIL NFR BLD AUTO: 1 % (ref 0–6)
ERYTHROCYTE [DISTWIDTH] IN BLOOD BY AUTOMATED COUNT: 15 % (ref 11.6–15.1)
GFR SERPL CREATININE-BSD FRML MDRD: 43 ML/MIN/1.73SQ M
GLUCOSE SERPL-MCNC: 79 MG/DL (ref 65–140)
HCT VFR BLD AUTO: 33.9 % (ref 36.5–49.3)
HGB BLD-MCNC: 10.4 G/DL (ref 12–17)
IMM GRANULOCYTES # BLD AUTO: 0.1 THOUSAND/UL (ref 0–0.2)
IMM GRANULOCYTES NFR BLD AUTO: 1 % (ref 0–2)
INR PPP: 1.13 (ref 0.86–1.16)
LIPASE SERPL-CCNC: 177 U/L (ref 73–393)
LYMPHOCYTES # BLD AUTO: 1.87 THOUSANDS/ΜL (ref 0.6–4.47)
LYMPHOCYTES NFR BLD AUTO: 17 % (ref 14–44)
MCH RBC QN AUTO: 34.1 PG (ref 26.8–34.3)
MCHC RBC AUTO-ENTMCNC: 30.7 G/DL (ref 31.4–37.4)
MCV RBC AUTO: 111 FL (ref 82–98)
MONOCYTES # BLD AUTO: 0.98 THOUSAND/ΜL (ref 0.17–1.22)
MONOCYTES NFR BLD AUTO: 9 % (ref 4–12)
NEUTROPHILS # BLD AUTO: 7.9 THOUSANDS/ΜL (ref 1.85–7.62)
NEUTS SEG NFR BLD AUTO: 70 % (ref 43–75)
NRBC BLD AUTO-RTO: 0 /100 WBCS
PLATELET # BLD AUTO: 212 THOUSANDS/UL (ref 149–390)
PMV BLD AUTO: 10.4 FL (ref 8.9–12.7)
POTASSIUM SERPL-SCNC: 4.6 MMOL/L (ref 3.5–5.3)
PROT SERPL-MCNC: 8.2 G/DL (ref 6.4–8.2)
PROTHROMBIN TIME: 11.8 SECONDS (ref 9.4–11.7)
RBC # BLD AUTO: 3.05 MILLION/UL (ref 3.88–5.62)
SODIUM SERPL-SCNC: 142 MMOL/L (ref 136–145)
WBC # BLD AUTO: 11.17 THOUSAND/UL (ref 4.31–10.16)

## 2019-03-18 PROCEDURE — 85025 COMPLETE CBC W/AUTO DIFF WBC: CPT | Performed by: EMERGENCY MEDICINE

## 2019-03-18 PROCEDURE — 74177 CT ABD & PELVIS W/CONTRAST: CPT

## 2019-03-18 PROCEDURE — 99284 EMERGENCY DEPT VISIT MOD MDM: CPT

## 2019-03-18 PROCEDURE — 85610 PROTHROMBIN TIME: CPT | Performed by: EMERGENCY MEDICINE

## 2019-03-18 PROCEDURE — 96360 HYDRATION IV INFUSION INIT: CPT

## 2019-03-18 PROCEDURE — 85730 THROMBOPLASTIN TIME PARTIAL: CPT | Performed by: EMERGENCY MEDICINE

## 2019-03-18 PROCEDURE — 80053 COMPREHEN METABOLIC PANEL: CPT | Performed by: EMERGENCY MEDICINE

## 2019-03-18 PROCEDURE — 83690 ASSAY OF LIPASE: CPT | Performed by: EMERGENCY MEDICINE

## 2019-03-18 PROCEDURE — 36415 COLL VENOUS BLD VENIPUNCTURE: CPT | Performed by: EMERGENCY MEDICINE

## 2019-03-18 RX ORDER — AZITHROMYCIN 250 MG/1
500 TABLET, FILM COATED ORAL ONCE
Status: COMPLETED | OUTPATIENT
Start: 2019-03-18 | End: 2019-03-18

## 2019-03-18 RX ORDER — MAGNESIUM CARB/ALUMINUM HYDROX 105-160MG
296 TABLET,CHEWABLE ORAL ONCE
Status: COMPLETED | OUTPATIENT
Start: 2019-03-18 | End: 2019-03-18

## 2019-03-18 RX ORDER — AZITHROMYCIN 250 MG/1
TABLET, FILM COATED ORAL
Qty: 6 TABLET | Refills: 0 | Status: SHIPPED | OUTPATIENT
Start: 2019-03-18 | End: 2019-03-22

## 2019-03-18 RX ADMIN — SODIUM CHLORIDE 1000 ML: 0.9 INJECTION, SOLUTION INTRAVENOUS at 18:03

## 2019-03-18 RX ADMIN — IODIXANOL 80 ML: 320 INJECTION, SOLUTION INTRAVASCULAR at 18:52

## 2019-03-18 RX ADMIN — AZITHROMYCIN 500 MG: 250 TABLET, FILM COATED ORAL at 21:39

## 2019-03-18 RX ADMIN — MAGNESIUM CITRATE 296 ML: 1.75 LIQUID ORAL at 21:41

## 2019-03-18 NOTE — ED PROVIDER NOTES
History  Chief Complaint   Patient presents with    Evaluation of Abnormal Diagnostic Test     patient sent by [facility] - had an abdominal xray showing a mild colonic ileus  patient is non-verbal      Patient is a 17-year-old male with Down syndrome that is nonverbal was brought in by our local facility for developmentally disabled patients  They are typically very poor in descending any kind of information with the patient and the person that is his caretaker with him does not know the patient that well is not quite sure why they sent him in  Paperwork was sent he had an abdominal x-ray for a KUB for it sounds like a workup for kidney stones and showed that he might have a mild ileus  There is no report of nausea vomiting or diarrhea associated with this patient  Otherwise there is no way to get any history on this patient          Prior to Admission Medications   Prescriptions Last Dose Informant Patient Reported? Taking?    Docusate Sodium 50 MG/15ML LIQD  Outside Facility (Specify) Yes No   Si mg every morning Via G tube    Melatonin 5 MG TABS  Outside Facility (Specify) Yes No   Si mg by Per G Tube route daily at bedtime     Mouthwashes ([address] [place] [address]) LIQD  Outside Facility (Specify) Yes No   Sig: Apply to the mouth or throat 2 (two) times a day   Nutritional Supplements (NUTREN 1 5 PO)  Outside Facility (Specify) Yes No   Sig: Take by mouth Give at 45 ml/ hour until 1035 is infused in 22 hours with H2O flush of 30 ml/hr   QUEtiapine (SEROquel) 25 mg tablet  Outside Facility (Specify) Yes No   Si mg by Per G Tube route every morning     QUEtiapine (SEROquel) 50 mg tablet  Outside Facility (Specify) Yes No   Si mg by Per G Tube route daily at bedtime     bisacodyl (DULCOLAX) 10 mg suppository  Outside Facility (Specify) Yes No   Sig: Insert 10 mg into the rectum daily as needed for constipation   famotidine (PEPCID) 40 mg/5 mL suspension  Outside Facility (Specify) Yes No   Si mg by Per G Tube route 2 (two) times a day     ipratropium-albuterol (DUO-NEB) 0 5-2 5 mg/3 mL nebulizer solution   No No   Sig: Take 1 vial (3 mL total) by nebulization every 6 (six) hours   ketoconazole (NIZORAL) 2 % cream  Outside Facility (Specify) Yes No   Sig: Apply 1 application topically 2 (two) times a day To face     lacosamide (VIMPAT) 50 mg  Outside Facility (Specify) Yes No   Si mg by Per G Tube route every 12 (twelve) hours     lactobacillus acidophilus-bulgaricus (FLORANEX) tablet  Outside Facility (Specify) Yes No   Sig: Take 2 tablets by mouth 2 (two) times a day Via G-tube    polyethylene glycol (MIRALAX) 17 g packet  Outside Facility (Specify) Yes No   Si g daily Via G-tube    silver sulfadiazine (SILVADENE,SSD) 1 % cream  Outside Facility (Specify) Yes No   Sig: Apply topically 2 (two) times a day      Facility-Administered Medications: None       Past Medical History:   Diagnosis Date    Constipation     Down syndrome     Dysphagia     has a peg tube    Fall     Folliculitis     Fracture of left forearm     Gastritis     GERD (gastroesophageal reflux disease)     Impulse control disorder     Jaw fracture (HCC)     s/p right mandibular fracture  s/p tracheostomy    [facility])     Pericardial effusion     Pericardial effusion     had a pericardial window     Pleural effusion, bilateral     Pneumonia     Renal stone     Left    Seizure ([address]     Seizure ([address]     new onset        Past Surgical History:   Procedure Laterality Date    DENTAL REHABILITATION      EXPLORATORY LAPAROTOMY      for abdominal pain    PEG TUBE PLACEMENT      TRACHEOSTOMY      temporary after fall and jaw fracture -weaned off vent and trach removed       History reviewed  No pertinent family history  I have reviewed and agree with the history as documented      Social History     Tobacco Use    Smoking status: Never Smoker    Smokeless tobacco: Never Used   Substance Use Topics  Alcohol use: No    Drug use: No        Review of Systems   Unable to perform ROS: Patient nonverbal       Physical Exam  Physical Exam   Constitutional: He appears well-nourished  HENT:   Head: Normocephalic and atraumatic  Eyes: Pupils are equal, round, and reactive to light  EOM are normal    Cardiovascular: Normal rate and regular rhythm  Pulmonary/Chest: Effort normal and breath sounds normal    Abdominal: Soft  He exhibits distension  There is no rebound  Musculoskeletal: He exhibits no edema  Neurological: He is alert  No cranial nerve deficit  Skin: Skin is warm  Psychiatric: He has a normal mood and affect  Nursing note and vitals reviewed        Vital Signs  ED Triage Vitals [03/18/19 1640]   Temperature Pulse Respirations Blood Pressure SpO2   (!) 96 9 °F (36 1 °C) 68 18 111/62 98 %      Temp Source Heart Rate Source Patient Position - Orthostatic VS BP Location FiO2 (%)   Tympanic Monitor Sitting Right arm --      Pain Score       No Pain           Vitals:    03/18/19 1640 03/18/19 1830 03/18/19 1845   BP: 111/62     Pulse: 68 66 64   Patient Position - Orthostatic VS: Sitting         qSOFA     Row Name 03/18/19 1845 03/18/19 1830 03/18/19 1640          Altered mental status GCS < 15  --  --  --      Respiratory Rate > / =22  1  1  0      Systolic BP < / =678  --  --  0      Q Sofa Score  1  1  0            Visual Acuity      ED Medications  Medications   sodium chloride 0 9 % bolus 1,000 mL (0 mL Intravenous Stopped 3/18/19 1903)   iodixanol (VISIPAQUE) 320 MG/ML injection 100 mL (80 mL Intravenous Given 3/18/19 1852)   magnesium citrate (CITROMA) oral solution 296 mL (296 mL Oral Given 3/18/19 2141)   azithromycin (ZITHROMAX) tablet 500 mg (500 mg Oral Given 3/18/19 2139)       Diagnostic Studies  Results Reviewed     Procedure Component Value Units Date/Time    Comprehensive metabolic panel [[id number]]  (Abnormal) Collected:  03/18/19 1754    Lab Status:  Final result Specimen: Blood from Arm, Left Updated:  03/18/19 1820     Sodium 142 mmol/L      Potassium 4 6 mmol/L      Chloride 107 mmol/L      CO2 27 mmol/L      ANION GAP 8 mmol/L      BUN 43 mg/dL      Creatinine 1 72 mg/dL      Glucose 79 mg/dL      Calcium 9 1 mg/dL      AST 19 U/L      ALT 7 U/L      Alkaline Phosphatase 80 U/L      Total Protein 8 2 g/dL      Albumin 2 4 g/dL      Total Bilirubin 0 30 mg/dL      eGFR 43 ml/min/1 73sq m     Narrative:       National Kidney Disease Education Program recommendations are as follows:  GFR calculation is accurate only with a steady state creatinine  Chronic Kidney disease less than 60 ml/min/1 73 sq  meters  Kidney failure less than 15 ml/min/1 73 sq  meters      Lipase [[id number]]  (Normal) Collected:  03/18/19 1754    Lab Status:  Final result Specimen:  Blood from Arm, Left Updated:  03/18/19 1820     Lipase 177 u/L     Protime-INR [[id number]]  (Abnormal) Collected:  03/18/19 1754    Lab Status:  Final result Specimen:  Blood from Arm, Left Updated:  03/18/19 1812     Protime 11 8 seconds      INR 1 13    APTT [[id number]]  (Normal) Collected:  03/18/19 1754    Lab Status:  Final result Specimen:  Blood from Arm, Left Updated:  03/18/19 1812     PTT 27 seconds     CBC and differential [[id number]]  (Abnormal) Collected:  03/18/19 1754    Lab Status:  Final result Specimen:  Blood from Arm, Left Updated:  03/18/19 1800     WBC 11 17 Thousand/uL      RBC 3 05 Million/uL      Hemoglobin 10 4 g/dL      Hematocrit 33 9 %       fL      MCH 34 1 pg      MCHC 30 7 g/dL      RDW 15 0 %      MPV 10 4 fL      Platelets 958 Thousands/uL      nRBC 0 /100 WBCs      Neutrophils Relative 70 %      Immat GRANS % 1 %      Lymphocytes Relative 17 %      Monocytes Relative 9 %      Eosinophils Relative 1 %      Basophils Relative 2 %      Neutrophils Absolute 7 90 Thousands/µL      Immature Grans Absolute 0 10 Thousand/uL      Lymphocytes Absolute 1 87 Thousands/µL      Monocytes Absolute 0 98 Thousand/µL      Eosinophils Absolute 0 15 Thousand/µL      Basophils Absolute 0 17 Thousands/µL                  CT abdomen pelvis with contrast   Final Result by Sanjay García MD (03/18 1948)         1  Airspace consolidation in the right middle and lower lobes and small right pleural effusion, consistent with pneumonia  2   Large amount of stool throughout the colon suggesting constipation  No evidence of bowel obstruction, colitis or diverticulitis  3   Percutaneous gastrostomy tube balloon in the pylorus  No evidence of gastric of obstruction  4   Bilateral nonobstructing renal calculi measuring between 6 and 22 mm  No evidence of pyelonephritis or obstructive uropathy  The study was marked in [facility] for immediate notification  Workstation performed: [id number]                    Procedures  Procedures       Phone Contacts  ED Phone Contact    ED Course                               MDM  Number of Diagnoses or Management Options  Constipation:   Infiltrate noted on imaging study:   Diagnosis management comments: Patient's CT scan did not show any kind of obstruction accept a large amount of stool in the colon  Patient's lab work was otherwise normal   They thought they might have seen some questionable signs of a pneumonia in his right long but the patient has no cough no fever or no signs of pneumonia  I spoke to the patient's covering Dr  On the phone and told the findings, at this time he believes the patient can be sent back to his facility with a course of antibiotics there is G-tube he was also given some Mag citrate to his G-tube they can also give him enemas back at the facility         Amount and/or Complexity of Data Reviewed  Clinical lab tests: ordered and reviewed  Tests in the radiology section of CPT®: ordered and reviewed        Disposition  Final diagnoses:   Constipation   Infiltrate noted on imaging study     Time reflects when diagnosis was documented in both MDM as applicable and the Disposition within this note     Time User Action Codes Description Comment    3/18/2019  9:08 PM Azael Montesakes Add [K59 00] Constipation     3/18/2019  9:08 PM Wells Flakes Add [R93 89] Infiltrate noted on imaging study       ED Disposition     ED Disposition Condition Date/Time Comment    Discharge Stable Mon Mar 18, 2019  9:07 PM Myron Baumgarten discharge to home/self care              Follow-up Information     Follow up With Specialties Details Why Contact Info    Mona Roman MD Internal Medicine  As needed [phone]  [phone]            Discharge Medication List as of 3/18/2019  9:12 PM      START taking these medications    Details   azithromycin (ZITHROMAX) 250 mg tablet Take 2 tablets today then 1 tablet daily x 4 days, Print         CONTINUE these medications which have NOT CHANGED    Details   bisacodyl (DULCOLAX) 10 mg suppository Insert 10 mg into the rectum daily as needed for constipation, Historical Med      Docusate Sodium 50 MG/15ML LIQD 100 mg every morning Via G tube , Historical Med      famotidine (PEPCID) 40 mg/5 mL suspension 40 mg by Per G Tube route 2 (two) times a day  , Historical Med      ipratropium-albuterol (DUO-NEB) 0 5-2 5 mg/3 mL nebulizer solution Take 1 vial (3 mL total) by nebulization every 6 (six) hours, Starting Fri 10/12/2018, Print      ketoconazole (NIZORAL) 2 % cream Apply 1 application topically 2 (two) times a day To face  , Historical Med      lacosamide (VIMPAT) 50 mg 100 mg by Per G Tube route every 12 (twelve) hours  , Historical Med      lactobacillus acidophilus-bulgaricus (FLORANEX) tablet Take 2 tablets by mouth 2 (two) times a day Via G-tube , Historical Med      Melatonin 5 MG TABS 5 mg by Per G Tube route daily at bedtime  , Historical Med      Mouthwashes (BIOTENE DRY MOUTH) LIQD Apply to the mouth or throat 2 (two) times a day, Historical Med      Nutritional Supplements (NUTREN 1 5 PO) Take by mouth Give at 45 ml/ hour until 1035 is infused in 22 hours with H2O flush of 30 ml/hr, Historical Med      polyethylene glycol (MIRALAX) 17 g packet 17 g daily Via G-tube , Historical Med      !! QUEtiapine (SEROquel) 25 mg tablet 25 mg by Per G Tube route every morning  , Historical Med      !! QUEtiapine (SEROquel) 50 mg tablet 50 mg by Per G Tube route daily at bedtime  , Historical Med      silver sulfadiazine (SILVADENE,SSD) 1 % cream Apply topically 2 (two) times a day, Historical Med       !! - Potential duplicate medications found  Please discuss with provider  No discharge procedures on file      ED Provider  Electronically Signed by           Kandis Mar MD  03/19/19 0001       Kandis Mar MD  04/05/19 1684

## 2019-04-01 ENCOUNTER — TRANSCRIBE ORDERS (OUTPATIENT)
Dept: ADMINISTRATIVE | Facility: HOSPITAL | Age: 54
End: 2019-04-01

## 2019-04-01 DIAGNOSIS — N20.0 RENAL CALCULI: Primary | ICD-10-CM

## 2019-04-12 ENCOUNTER — HOSPITAL ENCOUNTER (OUTPATIENT)
Dept: RADIOLOGY | Facility: HOSPITAL | Age: 54
Discharge: HOME | End: 2019-04-12
Payer: MEDICARE

## 2019-04-12 DIAGNOSIS — N20.0 RENAL CALCULI: ICD-10-CM

## 2019-04-12 PROCEDURE — 74176 CT ABD & PELVIS W/O CONTRAST: CPT

## 2019-06-13 NOTE — ASSESSMENT & PLAN NOTE
· Repeat CXR on 10/8/18:  · Worsening bilateral infiltrates, left side greater than right, and moderate-sized right pleural effusion  · Procalcitonin: down trended 1 09  · Influenza A/B: Negative  · BCx x2: Proteus  · Sputum CX/GS positive for respiratory luann  · Pt completed 7 day course of cefepime  · Continue aggressive pulmonary toilet  · NT suctioning as needed  · Continue chest PT  · Wean supplemental oxygen for O2 saturation greater than 92% 146

## 2019-12-04 ENCOUNTER — OFFICE VISIT (OUTPATIENT)
Dept: CARDIOLOGY CLINIC | Facility: CLINIC | Age: 54
End: 2019-12-04
Payer: MEDICARE

## 2019-12-04 VITALS
HEIGHT: 66 IN | BODY MASS INDEX: 25.5 KG/M2 | DIASTOLIC BLOOD PRESSURE: 70 MMHG | SYSTOLIC BLOOD PRESSURE: 120 MMHG | OXYGEN SATURATION: 95 % | HEART RATE: 68 BPM

## 2019-12-04 DIAGNOSIS — Q90.9 DOWN SYNDROME: ICD-10-CM

## 2019-12-04 DIAGNOSIS — R45.1 AGITATION: ICD-10-CM

## 2019-12-04 DIAGNOSIS — G40.909 SEIZURE DISORDER (HCC): ICD-10-CM

## 2019-12-04 DIAGNOSIS — F63.9 IMPULSE CONTROL DISORDER: ICD-10-CM

## 2019-12-04 DIAGNOSIS — F73 PROFOUND INTELLECTUAL DISABILITY IN ADULT: ICD-10-CM

## 2019-12-04 DIAGNOSIS — R94.31 ABNORMAL EKG: Primary | ICD-10-CM

## 2019-12-04 DIAGNOSIS — D63.8 ANEMIA OF CHRONIC DISEASE: ICD-10-CM

## 2019-12-04 DIAGNOSIS — N18.30 CHRONIC KIDNEY DISEASE, STAGE 3 (HCC): ICD-10-CM

## 2019-12-04 PROCEDURE — 99214 OFFICE O/P EST MOD 30 MIN: CPT | Performed by: INTERNAL MEDICINE

## 2019-12-04 RX ORDER — MENTHOL AND ZINC OXIDE .44; 20.625 G/100G; G/100G
OINTMENT TOPICAL 2 TIMES DAILY
COMMUNITY

## 2019-12-04 RX ORDER — FERROUS SULFATE 220 (44)/5
7.5 SOLUTION, ORAL ORAL 2 TIMES DAILY
COMMUNITY

## 2019-12-04 NOTE — PATIENT INSTRUCTIONS
1  As cardiac examination, cardiac history, and current EKG is stable, compared to March, 2018, there is no need for further cardiac testing or intervention at this time  2  Concur with present management  3  Will be happy to re-evaluate this patient upon request in the future

## 2019-12-04 NOTE — PROGRESS NOTES
Office Cardiology Progress Note  Teresa Hansen 62 y o  male MRN: [id number]  12/04/19  9:55 AM      ASSESSMENT:    1   No active cardiac disease  Current EKG is essentially unchanged from 03/16/2018, and patient had unremarkable transthoracic echocardiogram on 10/05/2018   2   Incomplete right bundle branch block with nondiagnostic inferior Q-waves on 10/30/2019 and 03/16/2018 EKG's  3   History of profound intellectual disability and Down's syndrome with impulse control disorder and agitation  4   Presence of PEG tube and history of aspiration pneumonia as well as prior tracheostomy  Hospitalization for acute respiratory failure with sepsis secondary to pneumonia and UTI 10/2 through 10/12/2018   5   History of profound intellectual disability and Down's syndrome with impulse control disorder and agitation  6   History of pericardial effusion and pericardial window with no pericardial effusion on echocardiogram of 10/05/2018   7   History of left lower extremity DVT  8   History of GERD  9   History of seizure disorder  10  Moderate anemia of chronic disease  11  Chronic kidney disease, stage III  Plan       Patient Instructions     1  As cardiac examination, cardiac history, and current EKG is stable, compared to March, 2018, there is no need for further cardiac testing or intervention at this time  2  Concur with present management  3  Will be happy to re-evaluate this patient upon request in the future  HPI    This 62 y o  male  has not demonstrated new cardiopulmonary and medical symptoms, according to his caregiver  The patient is referred for investigation of an abnormal EKG  He is seen in follow-up of the below listed diagnoses  Encounter Diagnoses   Name Primary?     Abnormal EKG Yes    Profound intellectual disability in adult     Down syndrome     Agitation     Impulse control disorder     Seizure disorder (HCC)     Chronic kidney disease, stage 3 (HCC)     Anemia of chronic disease         Review of Systems    All other systems negative, except as noted in history of present illness    Historical Information   Past Medical History:   Diagnosis Date    Constipation     Down syndrome     Dysphagia     has a peg tube    Fall     Folliculitis     Fracture of left forearm     Gastritis     GERD (gastroesophageal reflux disease)     Impulse control disorder     Jaw fracture (HCC)     s/p right mandibular fracture  s/p tracheostomy    [facility])     Pericardial effusion     Pericardial effusion     had a pericardial window 2015    Pleural effusion, bilateral     Pneumonia     Renal stone     Left    [facility] [address] [facility]     Se[facility] [address] [facility]     new onset 2013     Past Surgical History:   Procedure Laterality Date    DENTAL REHABILITATION      EXPLORATORY LAPAROTOMY      for abdominal pain    PEG TUBE PLACEMENT      TRACHEOSTOMY      temporary after fall and jaw fracture 2015-weaned off vent and trach removed     Social History     Substance and Sexual Activity   Alcohol Use No     Social History     Substance and Sexual Activity   Drug Use No     Social History     Tobacco Use   Smoking Status Never Smoker   Smokeless Tobacco Never Used       Family History:  History reviewed  No pertinent family history  Meds/Allergies     Prior to Admission medications    Medication Sig Start Date End Date Taking?  Authorizing Provider   Docusate Sodium 100 MG capsule 100 mg by Per G Tube route every morning Via G tube   Yes Historical Provider, MD   famotidine (PEPCID) 40 mg/5 mL suspension 20 mg by Per G Tube route 2 (two) times a day    Yes Historical Provider, MD   Ferrous Sulfate 220 (44 Fe) MG/5ML LIQD 7 5 mL by Per G Tube route 2 (two) times a day   Yes Historical Provider, MD   lacosamide (VIMPAT) 50 mg 100 mg by Per G Tube route every 12 (twelve) hours     Yes Historical Provider, MD   lactobacillus acidophilus-bulgaricus (FLORANEX) tablet Take 2 tablets by mouth 2 (two) times a day Via G-tube    Yes Historical Provider, MD   Melatonin 5 MG TABS 5 mg by Per G Tube route daily at bedtime     Yes Historical Provider, MD   menthol-zinc oxide (CALMOSPETINE) 0 44-20 625 % Apply topically 2 (two) times a day   Yes Historical Provider, MD   Mouthwashes ([address] [place] [address]) LIQD Apply to the mouth or throat 2 (two) times a day   Yes Historical Provider, MD   Nutritional Supplements (NUTREN 1 5 PO) Take by mouth Give at 45 ml/ hour until 1035 is infused in 22 hours with H2O flush of 30 ml/hr   Yes Historical Provider, MD   polyethylene glycol (MIRALAX) 17 g packet 17 g daily Via G-tube    Yes Historical Provider, MD   potassium citrate-sodium citrate-citric acid (CYTRA-3) 550-500-334 mg/5 mL Take 10 mL by mouth 4 (four) times a day   Yes Historical Provider, MD   povidone-iodine (BETADINE) 7 5 % SOLN Apply topically daily as needed for wound care   Yes Historical Provider, MD   QUEtiapine (SEROquel) 25 mg tablet 25 mg by Per G Tube route daily at bedtime    Yes Historical Provider, MD   QUEtiapine (SEROquel) 50 mg tablet 50 mg by Per G Tube route daily at bedtime    Yes Historical Provider, MD   bisacodyl (DULCOLAX) 10 mg suppository Insert 10 mg into the rectum daily as needed for constipation    Historical Provider, MD   ketoconazole (NIZORAL) 2 % cream Apply 1 application topically 2 (two) times a day To face      Historical Provider, MD   silver sulfadiazine (SILVADENE,SSD) 1 % cream Apply topically 2 (two) times a day    Historical Provider, MD   ipratropium-albuterol (DUO-NEB) 0 5-2 5 mg/3 mL nebulizer solution Take 1 vial (3 mL total) by nebulization every 6 (six) hours  Patient not taking: Reported on 12/4/2019 10/12/18 12/4/19  REMEDIOS Fernando       Allergies   Allergen Reactions    Keppra [Levetiracetam]      Adverse drug reaction         Rice     Tape [Medical Tape] Rash     Adhesive tape           Vitals:    12/04/19 0857   BP: 120/70   BP Location: Right arm Patient Position: Sitting   Cuff Size: Standard   Pulse: 68   SpO2: 95%   Height: 5' 6" (1 676 m)       Body mass index is 25 5 kg/m²  Physical Exam:    General Appearance:  Alert, cooperative, no distress, appears stated age with very limited verbal ability  Head:  Normocephalic, without obvious abnormality, atraumatic   Eyes:  PERRL, conjunctiva/corneas clear, EOM's intact,   both eyes   Ears:  Normal TM's and external ear canals, both ears   Nose: Nares normal, septum midline, mucosa normal, no drainage or sinus tenderness   Throat: Lips, mucosa, and tongue normal; teeth and gums normal   Neck: Supple, symmetrical, trachea midline, no adenopathy, thyroid: not enlarged, symmetric, no tenderness/mass/nodules, no carotid bruit or JVD   Back:   Symmetric, no curvature, ROM normal, no CVA tenderness   Lungs:   Grossly clear to auscultation bilaterally, respirations unlabored   Chest Wall:  No tenderness or deformity   Heart:  Regular rate and rhythm, S1, S2 normal, no murmur, rub or gallop   Abdomen:   Soft, non-tender, bowel sounds active all four quadrants,  no masses, no organomegaly with PEG tube in left upper quadrant of abdomen  Extremities: Extremities normal, atraumatic, no cyanosis or edema   Pulses: 2+ and symmetric   Skin: Skin showed normal color, texture, turgor and no rashes or lesions   Lymph nodes: Cervical, supraclavicular, and axillary nodes normal   Neurologic: Normal         Cardiographics    ECG 10/30/2019:    Normal sinus rhythm  Incomplete right bundle branch block  Nondiagnostic inferior Q-waves  Poor R-wave progression  Faster heart rate by 33 bpm with no other changes compared to 03/16/2018  IMAGING:    No Chest XR results available for this patient      Transthoracic echocardiogram 10/05/2018:    60% LVEF  Trace TR  No pericardial effusion      LAB REVIEW:      Lab Results   Component Value Date    SODIUM 142 03/18/2019    K 4 6 03/18/2019     03/18/2019    CO2 27 03/18/2019    BUN 43 (H) 03/18/2019    CREATININE 1 72 (H) 03/18/2019    CALCIUM 9 1 03/18/2019    AST 19 03/18/2019    ALT 7 (L) 03/18/2019    ALKPHOS 80 03/18/2019    EGFR 43 03/18/2019     Pertinent outside laboratory data:    10/29/2019 BMP:  BUN/creatinine-57/1 6 with estimated GFR 45 with otherwise normal BMP  CBC 10/29/2019:  WBC-12 2, H/H-9 4/30, normal platelets  PSA 18/91/8898:  0 36  Lipid panel 04/10/2019:  Cholesterol 124, TG 39, HDL 62, LDL 54 2          Fortino Arrington MD